# Patient Record
Sex: FEMALE | Race: WHITE | NOT HISPANIC OR LATINO | URBAN - METROPOLITAN AREA
[De-identification: names, ages, dates, MRNs, and addresses within clinical notes are randomized per-mention and may not be internally consistent; named-entity substitution may affect disease eponyms.]

---

## 2017-02-08 ENCOUNTER — OUTPATIENT (OUTPATIENT)
Dept: OUTPATIENT SERVICES | Facility: HOSPITAL | Age: 67
LOS: 1 days | Discharge: HOME | End: 2017-02-08

## 2017-02-28 ENCOUNTER — RECORD ABSTRACTING (OUTPATIENT)
Age: 67
End: 2017-02-28

## 2017-02-28 DIAGNOSIS — Z82.49 FAMILY HISTORY OF ISCHEMIC HEART DISEASE AND OTHER DISEASES OF THE CIRCULATORY SYSTEM: ICD-10-CM

## 2017-02-28 DIAGNOSIS — N63 UNSPECIFIED LUMP IN BREAST: ICD-10-CM

## 2017-02-28 DIAGNOSIS — Z83.42 FAMILY HISTORY OF FAMILIAL HYPERCHOLESTEROLEMIA: ICD-10-CM

## 2017-02-28 DIAGNOSIS — Z80.3 FAMILY HISTORY OF MALIGNANT NEOPLASM OF BREAST: ICD-10-CM

## 2017-02-28 DIAGNOSIS — L70.9 ACNE, UNSPECIFIED: ICD-10-CM

## 2017-02-28 DIAGNOSIS — C44.91 BASAL CELL CARCINOMA OF SKIN, UNSPECIFIED: ICD-10-CM

## 2017-02-28 PROBLEM — Z00.00 ENCOUNTER FOR PREVENTIVE HEALTH EXAMINATION: Status: ACTIVE | Noted: 2017-02-28

## 2017-06-07 ENCOUNTER — OUTPATIENT (OUTPATIENT)
Dept: OUTPATIENT SERVICES | Facility: HOSPITAL | Age: 67
LOS: 1 days | Discharge: HOME | End: 2017-06-07

## 2017-06-28 DIAGNOSIS — F33.1 MAJOR DEPRESSIVE DISORDER, RECURRENT, MODERATE: ICD-10-CM

## 2017-06-28 DIAGNOSIS — F43.12 POST-TRAUMATIC STRESS DISORDER, CHRONIC: ICD-10-CM

## 2017-07-05 ENCOUNTER — OUTPATIENT (OUTPATIENT)
Dept: OUTPATIENT SERVICES | Facility: HOSPITAL | Age: 67
LOS: 1 days | Discharge: HOME | End: 2017-07-05

## 2017-07-05 DIAGNOSIS — F33.1 MAJOR DEPRESSIVE DISORDER, RECURRENT, MODERATE: ICD-10-CM

## 2017-07-05 DIAGNOSIS — F43.12 POST-TRAUMATIC STRESS DISORDER, CHRONIC: ICD-10-CM

## 2017-09-05 ENCOUNTER — OUTPATIENT (OUTPATIENT)
Dept: OUTPATIENT SERVICES | Facility: HOSPITAL | Age: 67
LOS: 1 days | Discharge: HOME | End: 2017-09-05

## 2017-09-05 DIAGNOSIS — F33.1 MAJOR DEPRESSIVE DISORDER, RECURRENT, MODERATE: ICD-10-CM

## 2017-09-05 DIAGNOSIS — F43.12 POST-TRAUMATIC STRESS DISORDER, CHRONIC: ICD-10-CM

## 2017-11-01 ENCOUNTER — OUTPATIENT (OUTPATIENT)
Dept: OUTPATIENT SERVICES | Facility: HOSPITAL | Age: 67
LOS: 1 days | Discharge: HOME | End: 2017-11-01

## 2017-11-01 DIAGNOSIS — F43.12 POST-TRAUMATIC STRESS DISORDER, CHRONIC: ICD-10-CM

## 2017-11-01 DIAGNOSIS — F41.1 GENERALIZED ANXIETY DISORDER: ICD-10-CM

## 2018-01-31 ENCOUNTER — OUTPATIENT (OUTPATIENT)
Dept: OUTPATIENT SERVICES | Facility: HOSPITAL | Age: 68
LOS: 1 days | Discharge: HOME | End: 2018-01-31

## 2018-01-31 DIAGNOSIS — F43.12 POST-TRAUMATIC STRESS DISORDER, CHRONIC: ICD-10-CM

## 2018-01-31 DIAGNOSIS — F41.1 GENERALIZED ANXIETY DISORDER: ICD-10-CM

## 2018-04-03 ENCOUNTER — OUTPATIENT (OUTPATIENT)
Dept: OUTPATIENT SERVICES | Facility: HOSPITAL | Age: 68
LOS: 1 days | Discharge: HOME | End: 2018-04-03

## 2018-04-03 DIAGNOSIS — F41.1 GENERALIZED ANXIETY DISORDER: ICD-10-CM

## 2018-07-27 PROBLEM — C44.91 BASAL CELL CARCINOMA OF SKIN: Status: ACTIVE | Noted: 2017-02-28

## 2018-08-09 ENCOUNTER — OUTPATIENT (OUTPATIENT)
Dept: OUTPATIENT SERVICES | Facility: HOSPITAL | Age: 68
LOS: 1 days | Discharge: HOME | End: 2018-08-09

## 2018-08-09 DIAGNOSIS — F41.1 GENERALIZED ANXIETY DISORDER: ICD-10-CM

## 2018-09-06 ENCOUNTER — OUTPATIENT (OUTPATIENT)
Dept: OUTPATIENT SERVICES | Facility: HOSPITAL | Age: 68
LOS: 1 days | Discharge: HOME | End: 2018-09-06

## 2018-09-06 DIAGNOSIS — F41.1 GENERALIZED ANXIETY DISORDER: ICD-10-CM

## 2018-10-01 ENCOUNTER — OUTPATIENT (OUTPATIENT)
Dept: OUTPATIENT SERVICES | Facility: HOSPITAL | Age: 68
LOS: 1 days | Discharge: HOME | End: 2018-10-01

## 2018-10-01 DIAGNOSIS — F41.1 GENERALIZED ANXIETY DISORDER: ICD-10-CM

## 2018-12-06 ENCOUNTER — OUTPATIENT (OUTPATIENT)
Dept: OUTPATIENT SERVICES | Facility: HOSPITAL | Age: 68
LOS: 1 days | Discharge: HOME | End: 2018-12-06

## 2018-12-06 DIAGNOSIS — F41.1 GENERALIZED ANXIETY DISORDER: ICD-10-CM

## 2019-01-03 ENCOUNTER — OUTPATIENT (OUTPATIENT)
Dept: OUTPATIENT SERVICES | Facility: HOSPITAL | Age: 69
LOS: 1 days | Discharge: HOME | End: 2019-01-03

## 2019-01-03 DIAGNOSIS — F33.1 MAJOR DEPRESSIVE DISORDER, RECURRENT, MODERATE: ICD-10-CM

## 2019-01-03 DIAGNOSIS — F60.3 BORDERLINE PERSONALITY DISORDER: ICD-10-CM

## 2019-05-30 ENCOUNTER — OUTPATIENT (OUTPATIENT)
Dept: OUTPATIENT SERVICES | Facility: HOSPITAL | Age: 69
LOS: 1 days | Discharge: HOME | End: 2019-05-30
Payer: OTHER MISCELLANEOUS

## 2019-05-30 DIAGNOSIS — F60.9 PERSONALITY DISORDER, UNSPECIFIED: ICD-10-CM

## 2019-05-30 PROCEDURE — 99214 OFFICE O/P EST MOD 30 MIN: CPT

## 2019-06-20 ENCOUNTER — OUTPATIENT (OUTPATIENT)
Dept: OUTPATIENT SERVICES | Facility: HOSPITAL | Age: 69
LOS: 1 days | Discharge: HOME | End: 2019-06-20

## 2019-06-20 DIAGNOSIS — F60.9 PERSONALITY DISORDER, UNSPECIFIED: ICD-10-CM

## 2019-07-31 ENCOUNTER — OUTPATIENT (OUTPATIENT)
Dept: OUTPATIENT SERVICES | Facility: HOSPITAL | Age: 69
LOS: 1 days | Discharge: HOME | End: 2019-07-31
Payer: OTHER MISCELLANEOUS

## 2019-07-31 DIAGNOSIS — F60.9 PERSONALITY DISORDER, UNSPECIFIED: ICD-10-CM

## 2019-07-31 PROCEDURE — 99214 OFFICE O/P EST MOD 30 MIN: CPT | Mod: GC

## 2019-09-20 ENCOUNTER — OUTPATIENT (OUTPATIENT)
Dept: OUTPATIENT SERVICES | Facility: HOSPITAL | Age: 69
LOS: 1 days | Discharge: HOME | End: 2019-09-20
Payer: OTHER MISCELLANEOUS

## 2019-09-20 DIAGNOSIS — F60.9 PERSONALITY DISORDER, UNSPECIFIED: ICD-10-CM

## 2019-09-20 PROCEDURE — 99214 OFFICE O/P EST MOD 30 MIN: CPT | Mod: GC

## 2019-10-30 ENCOUNTER — OUTPATIENT (OUTPATIENT)
Dept: OUTPATIENT SERVICES | Facility: HOSPITAL | Age: 69
LOS: 1 days | Discharge: HOME | End: 2019-10-30
Payer: MEDICARE

## 2019-10-30 DIAGNOSIS — F60.9 PERSONALITY DISORDER, UNSPECIFIED: ICD-10-CM

## 2019-10-30 PROCEDURE — 99214 OFFICE O/P EST MOD 30 MIN: CPT | Mod: GC

## 2019-12-12 ENCOUNTER — OUTPATIENT (OUTPATIENT)
Dept: OUTPATIENT SERVICES | Facility: HOSPITAL | Age: 69
LOS: 1 days | Discharge: HOME | End: 2019-12-12
Payer: OTHER MISCELLANEOUS

## 2019-12-12 DIAGNOSIS — F60.9 PERSONALITY DISORDER, UNSPECIFIED: ICD-10-CM

## 2019-12-12 PROCEDURE — 99214 OFFICE O/P EST MOD 30 MIN: CPT | Mod: GC

## 2020-01-29 ENCOUNTER — OUTPATIENT (OUTPATIENT)
Dept: OUTPATIENT SERVICES | Facility: HOSPITAL | Age: 70
LOS: 1 days | Discharge: HOME | End: 2020-01-29
Payer: OTHER MISCELLANEOUS

## 2020-01-29 DIAGNOSIS — F41.1 GENERALIZED ANXIETY DISORDER: ICD-10-CM

## 2020-01-29 PROCEDURE — 99213 OFFICE O/P EST LOW 20 MIN: CPT | Mod: GC

## 2020-03-06 ENCOUNTER — OUTPATIENT (OUTPATIENT)
Dept: OUTPATIENT SERVICES | Facility: HOSPITAL | Age: 70
LOS: 1 days | Discharge: HOME | End: 2020-03-06
Payer: OTHER MISCELLANEOUS

## 2020-03-06 DIAGNOSIS — F31.11 BIPOLAR DISORDER, CURRENT EPISODE MANIC WITHOUT PSYCHOTIC FEATURES, MILD: ICD-10-CM

## 2020-03-06 PROCEDURE — 99214 OFFICE O/P EST MOD 30 MIN: CPT | Mod: GC

## 2020-03-27 ENCOUNTER — OUTPATIENT (OUTPATIENT)
Dept: OUTPATIENT SERVICES | Facility: HOSPITAL | Age: 70
LOS: 1 days | Discharge: HOME | End: 2020-03-27
Payer: OTHER MISCELLANEOUS

## 2020-03-27 DIAGNOSIS — F41.1 GENERALIZED ANXIETY DISORDER: ICD-10-CM

## 2020-03-27 PROCEDURE — 99442: CPT | Mod: GC

## 2020-06-24 ENCOUNTER — OUTPATIENT (OUTPATIENT)
Dept: OUTPATIENT SERVICES | Facility: HOSPITAL | Age: 70
LOS: 1 days | Discharge: HOME | End: 2020-06-24

## 2020-06-24 DIAGNOSIS — F41.1 GENERALIZED ANXIETY DISORDER: ICD-10-CM

## 2020-07-15 ENCOUNTER — OUTPATIENT (OUTPATIENT)
Dept: OUTPATIENT SERVICES | Facility: HOSPITAL | Age: 70
LOS: 1 days | Discharge: HOME | End: 2020-07-15

## 2020-07-15 DIAGNOSIS — F41.1 GENERALIZED ANXIETY DISORDER: ICD-10-CM

## 2020-07-28 ENCOUNTER — OUTPATIENT (OUTPATIENT)
Dept: OUTPATIENT SERVICES | Facility: HOSPITAL | Age: 70
LOS: 1 days | Discharge: HOME | End: 2020-07-28

## 2020-07-28 DIAGNOSIS — F41.1 GENERALIZED ANXIETY DISORDER: ICD-10-CM

## 2020-08-25 ENCOUNTER — OUTPATIENT (OUTPATIENT)
Dept: OUTPATIENT SERVICES | Facility: HOSPITAL | Age: 70
LOS: 1 days | Discharge: HOME | End: 2020-08-25

## 2020-08-25 DIAGNOSIS — F41.1 GENERALIZED ANXIETY DISORDER: ICD-10-CM

## 2020-08-27 RX ORDER — DIAZEPAM 5 MG
1 TABLET ORAL
Qty: 60 | Refills: 0
Start: 2020-08-27 | End: 2020-09-25

## 2020-10-07 ENCOUNTER — OUTPATIENT (OUTPATIENT)
Dept: OUTPATIENT SERVICES | Facility: HOSPITAL | Age: 70
LOS: 1 days | Discharge: HOME | End: 2020-10-07
Payer: MEDICARE

## 2020-10-07 DIAGNOSIS — Z12.31 ENCOUNTER FOR SCREENING MAMMOGRAM FOR MALIGNANT NEOPLASM OF BREAST: ICD-10-CM

## 2020-10-07 PROCEDURE — 77067 SCR MAMMO BI INCL CAD: CPT | Mod: 26

## 2020-10-07 PROCEDURE — 76641 ULTRASOUND BREAST COMPLETE: CPT | Mod: 26,50

## 2020-10-07 PROCEDURE — 77063 BREAST TOMOSYNTHESIS BI: CPT | Mod: 26

## 2020-10-20 RX ORDER — DIAZEPAM 5 MG
1 TABLET ORAL
Qty: 50 | Refills: 0
Start: 2020-10-20 | End: 2020-12-11

## 2020-10-20 RX ORDER — DIAZEPAM 5 MG
1 TABLET ORAL
Qty: 50 | Refills: 0
Start: 2020-10-20 | End: 2020-11-18

## 2020-10-22 ENCOUNTER — OUTPATIENT (OUTPATIENT)
Dept: OUTPATIENT SERVICES | Facility: HOSPITAL | Age: 70
LOS: 1 days | Discharge: HOME | End: 2020-10-22
Payer: MEDICARE

## 2020-10-22 DIAGNOSIS — R92.8 OTHER ABNORMAL AND INCONCLUSIVE FINDINGS ON DIAGNOSTIC IMAGING OF BREAST: ICD-10-CM

## 2020-10-22 PROCEDURE — 76642 ULTRASOUND BREAST LIMITED: CPT | Mod: 26,RT

## 2020-10-22 PROCEDURE — G0279: CPT | Mod: 26

## 2020-10-22 PROCEDURE — 77065 DX MAMMO INCL CAD UNI: CPT | Mod: 26,LT

## 2020-12-18 ENCOUNTER — APPOINTMENT (OUTPATIENT)
Dept: PSYCHIATRY | Facility: CLINIC | Age: 70
End: 2020-12-18

## 2021-01-15 ENCOUNTER — APPOINTMENT (OUTPATIENT)
Dept: PSYCHIATRY | Facility: CLINIC | Age: 71
End: 2021-01-15

## 2021-01-15 RX ORDER — HYDROCODONE BITARTRATE AND ACETAMINOPHEN 5; 300 MG/1; MG/1
TABLET ORAL
Refills: 0 | Status: DISCONTINUED | COMMUNITY
End: 2021-01-15

## 2021-02-16 ENCOUNTER — APPOINTMENT (OUTPATIENT)
Dept: PSYCHIATRY | Facility: CLINIC | Age: 71
End: 2021-02-16

## 2021-03-16 ENCOUNTER — APPOINTMENT (OUTPATIENT)
Dept: PSYCHIATRY | Facility: CLINIC | Age: 71
End: 2021-03-16

## 2021-03-16 RX ORDER — TRAZODONE HYDROCHLORIDE 150 MG/1
150 TABLET ORAL
Refills: 0 | Status: DISCONTINUED | COMMUNITY
End: 2021-03-16

## 2021-04-13 ENCOUNTER — APPOINTMENT (OUTPATIENT)
Dept: PSYCHIATRY | Facility: CLINIC | Age: 71
End: 2021-04-13

## 2021-05-13 ENCOUNTER — APPOINTMENT (OUTPATIENT)
Dept: PSYCHIATRY | Facility: CLINIC | Age: 71
End: 2021-05-13

## 2021-05-14 ENCOUNTER — APPOINTMENT (OUTPATIENT)
Dept: PSYCHIATRY | Facility: CLINIC | Age: 71
End: 2021-05-14

## 2021-06-10 ENCOUNTER — APPOINTMENT (OUTPATIENT)
Dept: PSYCHIATRY | Facility: CLINIC | Age: 71
End: 2021-06-10

## 2021-07-08 ENCOUNTER — APPOINTMENT (OUTPATIENT)
Dept: PSYCHIATRY | Facility: CLINIC | Age: 71
End: 2021-07-08

## 2021-07-08 ENCOUNTER — OUTPATIENT (OUTPATIENT)
Dept: OUTPATIENT SERVICES | Facility: HOSPITAL | Age: 71
LOS: 1 days | Discharge: HOME | End: 2021-07-08

## 2021-07-08 DIAGNOSIS — F41.9 ANXIETY DISORDER, UNSPECIFIED: ICD-10-CM

## 2021-08-17 ENCOUNTER — APPOINTMENT (OUTPATIENT)
Dept: PSYCHIATRY | Facility: CLINIC | Age: 71
End: 2021-08-17

## 2021-09-13 ENCOUNTER — APPOINTMENT (OUTPATIENT)
Dept: PSYCHIATRY | Facility: CLINIC | Age: 71
End: 2021-09-13

## 2021-09-13 ENCOUNTER — OUTPATIENT (OUTPATIENT)
Dept: OUTPATIENT SERVICES | Facility: HOSPITAL | Age: 71
LOS: 1 days | Discharge: HOME | End: 2021-09-13

## 2021-09-13 DIAGNOSIS — F41.9 ANXIETY DISORDER, UNSPECIFIED: ICD-10-CM

## 2021-10-11 ENCOUNTER — APPOINTMENT (OUTPATIENT)
Dept: PSYCHIATRY | Facility: CLINIC | Age: 71
End: 2021-10-11

## 2021-10-11 ENCOUNTER — OUTPATIENT (OUTPATIENT)
Dept: OUTPATIENT SERVICES | Facility: HOSPITAL | Age: 71
LOS: 1 days | Discharge: HOME | End: 2021-10-11

## 2021-10-11 DIAGNOSIS — F41.9 ANXIETY DISORDER, UNSPECIFIED: ICD-10-CM

## 2021-11-15 ENCOUNTER — APPOINTMENT (OUTPATIENT)
Dept: PSYCHIATRY | Facility: CLINIC | Age: 71
End: 2021-11-15

## 2021-11-15 ENCOUNTER — OUTPATIENT (OUTPATIENT)
Dept: OUTPATIENT SERVICES | Facility: HOSPITAL | Age: 71
LOS: 1 days | Discharge: HOME | End: 2021-11-15

## 2021-11-15 DIAGNOSIS — F41.9 ANXIETY DISORDER, UNSPECIFIED: ICD-10-CM

## 2021-12-06 ENCOUNTER — OUTPATIENT (OUTPATIENT)
Dept: OUTPATIENT SERVICES | Facility: HOSPITAL | Age: 71
LOS: 1 days | Discharge: HOME | End: 2021-12-06

## 2021-12-06 ENCOUNTER — APPOINTMENT (OUTPATIENT)
Dept: PSYCHIATRY | Facility: CLINIC | Age: 71
End: 2021-12-06

## 2021-12-06 DIAGNOSIS — F41.9 ANXIETY DISORDER, UNSPECIFIED: ICD-10-CM

## 2021-12-06 RX ORDER — VARENICLINE TARTRATE 0.5 (11)-1
0.5 MG X 11 & KIT ORAL
Qty: 1 | Refills: 0 | Status: COMPLETED | COMMUNITY
Start: 2021-07-08 | End: 2021-12-06

## 2021-12-06 RX ORDER — DIAZEPAM 10 MG/1
10 TABLET ORAL DAILY
Qty: 30 | Refills: 0 | Status: COMPLETED | COMMUNITY
Start: 2021-08-20 | End: 2021-12-06

## 2022-01-10 ENCOUNTER — APPOINTMENT (OUTPATIENT)
Dept: PSYCHIATRY | Facility: CLINIC | Age: 72
End: 2022-01-10

## 2022-01-10 ENCOUNTER — OUTPATIENT (OUTPATIENT)
Dept: OUTPATIENT SERVICES | Facility: HOSPITAL | Age: 72
LOS: 1 days | Discharge: HOME | End: 2022-01-10

## 2022-01-10 DIAGNOSIS — F41.9 ANXIETY DISORDER, UNSPECIFIED: ICD-10-CM

## 2022-02-07 ENCOUNTER — APPOINTMENT (OUTPATIENT)
Dept: PSYCHIATRY | Facility: CLINIC | Age: 72
End: 2022-02-07

## 2022-03-21 ENCOUNTER — APPOINTMENT (OUTPATIENT)
Dept: PSYCHIATRY | Facility: CLINIC | Age: 72
End: 2022-03-21

## 2022-03-21 ENCOUNTER — OUTPATIENT (OUTPATIENT)
Dept: OUTPATIENT SERVICES | Facility: HOSPITAL | Age: 72
LOS: 1 days | Discharge: HOME | End: 2022-03-21

## 2022-03-21 DIAGNOSIS — F41.9 ANXIETY DISORDER, UNSPECIFIED: ICD-10-CM

## 2022-03-21 RX ORDER — SERTRALINE 25 MG/1
25 TABLET, FILM COATED ORAL
Qty: 30 | Refills: 0 | Status: COMPLETED | COMMUNITY
Start: 2021-12-06 | End: 2022-03-21

## 2022-04-18 ENCOUNTER — APPOINTMENT (OUTPATIENT)
Dept: PSYCHIATRY | Facility: CLINIC | Age: 72
End: 2022-04-18

## 2022-04-18 ENCOUNTER — OUTPATIENT (OUTPATIENT)
Dept: OUTPATIENT SERVICES | Facility: HOSPITAL | Age: 72
LOS: 1 days | Discharge: HOME | End: 2022-04-18

## 2022-04-18 DIAGNOSIS — F41.9 ANXIETY DISORDER, UNSPECIFIED: ICD-10-CM

## 2022-04-18 RX ORDER — SERTRALINE HYDROCHLORIDE 50 MG/1
50 TABLET, FILM COATED ORAL
Qty: 30 | Refills: 0 | Status: COMPLETED | COMMUNITY
Start: 2022-02-07

## 2022-05-23 ENCOUNTER — OUTPATIENT (OUTPATIENT)
Dept: OUTPATIENT SERVICES | Facility: HOSPITAL | Age: 72
LOS: 1 days | Discharge: HOME | End: 2022-05-23

## 2022-05-23 ENCOUNTER — APPOINTMENT (OUTPATIENT)
Dept: PSYCHIATRY | Facility: CLINIC | Age: 72
End: 2022-05-23

## 2022-05-23 DIAGNOSIS — F41.9 ANXIETY DISORDER, UNSPECIFIED: ICD-10-CM

## 2022-06-27 ENCOUNTER — APPOINTMENT (OUTPATIENT)
Dept: PSYCHIATRY | Facility: CLINIC | Age: 72
End: 2022-06-27

## 2022-06-27 ENCOUNTER — OUTPATIENT (OUTPATIENT)
Dept: OUTPATIENT SERVICES | Facility: HOSPITAL | Age: 72
LOS: 1 days | Discharge: HOME | End: 2022-06-27

## 2022-06-27 DIAGNOSIS — F41.9 ANXIETY DISORDER, UNSPECIFIED: ICD-10-CM

## 2022-07-12 ENCOUNTER — APPOINTMENT (OUTPATIENT)
Dept: PAIN MANAGEMENT | Facility: CLINIC | Age: 72
End: 2022-07-12

## 2022-07-28 ENCOUNTER — APPOINTMENT (OUTPATIENT)
Dept: PSYCHIATRY | Facility: CLINIC | Age: 72
End: 2022-07-28

## 2022-07-28 ENCOUNTER — OUTPATIENT (OUTPATIENT)
Dept: OUTPATIENT SERVICES | Facility: HOSPITAL | Age: 72
LOS: 1 days | Discharge: HOME | End: 2022-07-28

## 2022-07-28 DIAGNOSIS — F41.9 ANXIETY DISORDER, UNSPECIFIED: ICD-10-CM

## 2022-07-28 PROCEDURE — 99215 OFFICE O/P EST HI 40 MIN: CPT | Mod: NC,GC,95

## 2022-08-09 ENCOUNTER — APPOINTMENT (OUTPATIENT)
Dept: PAIN MANAGEMENT | Facility: CLINIC | Age: 72
End: 2022-08-09

## 2022-09-01 ENCOUNTER — OUTPATIENT (OUTPATIENT)
Dept: OUTPATIENT SERVICES | Facility: HOSPITAL | Age: 72
LOS: 1 days | Discharge: HOME | End: 2022-09-01

## 2022-09-01 ENCOUNTER — APPOINTMENT (OUTPATIENT)
Dept: PSYCHIATRY | Facility: CLINIC | Age: 72
End: 2022-09-01

## 2022-09-01 DIAGNOSIS — F41.9 ANXIETY DISORDER, UNSPECIFIED: ICD-10-CM

## 2022-09-01 PROCEDURE — 99215 OFFICE O/P EST HI 40 MIN: CPT | Mod: NC,GC,95

## 2022-09-08 ENCOUNTER — LABORATORY RESULT (OUTPATIENT)
Age: 72
End: 2022-09-08

## 2022-09-08 ENCOUNTER — APPOINTMENT (OUTPATIENT)
Dept: PAIN MANAGEMENT | Facility: CLINIC | Age: 72
End: 2022-09-08

## 2022-09-08 VITALS — HEIGHT: 64 IN | WEIGHT: 160 LBS | BODY MASS INDEX: 27.31 KG/M2

## 2022-09-08 PROCEDURE — 99214 OFFICE O/P EST MOD 30 MIN: CPT

## 2022-09-08 PROCEDURE — 80104W: CUSTOM

## 2022-09-08 PROCEDURE — 99072 ADDL SUPL MATRL&STAF TM PHE: CPT

## 2022-09-08 RX ORDER — GABAPENTIN 300 MG/1
300 CAPSULE ORAL 3 TIMES DAILY
Refills: 0 | Status: DISCONTINUED | COMMUNITY
Start: 2021-01-15 | End: 2022-09-08
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
## 2022-09-09 NOTE — DISCUSSION/SUMMARY
[de-identified] : Ms. Che is a 71 year-old female familiar to our pracrtice being treated for chronic pain in her lower back and neck. Patient is currently being medically managed due to a work-related injury. She will continue with her current medication regimen of hydrocodone, gabapentin, and we will add meloxicam. She will follow up with us in 4 weeks for a revisit encounter and medication refill. \par \par Patient also has a new complaint of right hip pain which is made more severe when she walks. I advised the patient that she is a candidate fro a greater trochantic bursa injection. However, the patient does not wish to proceed with injections at this time. \par \par UDS done today is consistent and up to date. \par \par Disability and Work Status: Totally disabled, permanent.\par \par I, Madeline Miguel, attest that this documentation has been prepared under the direction and in the presence of Provider Artem Guerrero DO\par The documentation recorded by the scribe, in my presence, accurately reflects the service I personally performed, and the decisions made by me with my edits as appropriate.\par \par Best Regards, \par Artem Guerrero D.O. \par Diplomat, American Board of Anesthesiology \par Diplomat, American Board of Pain Medicine \par Diplomat, American Board of Pain Management

## 2022-09-09 NOTE — REASON FOR VISIT
[Follow-Up Visit] : a follow-up pain management visit [FreeTextEntry2] : FOLLOW UP FOR LOWER BACK PAIN AND MED REFILL

## 2022-09-09 NOTE — PHYSICAL EXAM
[de-identified] : GENERAL APPEARANCE OF PATIENT IS WELL DEVELOPED, WELL NOURISHED, BODY HABITUS NORMAL, WELL GROOMED, NO DEFORMITIES NOTED. \par Head - Atraumatic and Normocephalic \par Eyes, Nose, and Throat: External inspection of ears and nose are normal overall without scars, lesions, or masses noted. Assessment of hearing is normal\par Neck-Examination of neck shows no masses, overall appearance is normal, neck is symmetric, tracheal position is midline, no crepitus is noted. Examination of thyroid shows no enlargement, tenderness or masses\par Respiratory- Assessment of respiratory effort shows no intercostal retractions, no use of accessory muscles, unlabored breathing, and normal diaphragmatic movement.\par Cardiovascular- Examination of extremities show no edema or varicosities\par Musculoskeletal. Examination of gait is not antalgic and station is normal\par Inspection and palpation of digits and nails shows no clubbing, cyanosis, nodules, drainage, fluctuance, petechiae\par \par • Spine – 50-60 degrees in flexion. 60 in extension. Greater trochanter tenderness on the right. \par \par \par • Neck- inspection and palpation shows no misalignment, asymmetry, crepitation, defects, tenderness, masses, effusions. ROM is normal without crepitation or contracture. No instability or subluxation or laxity is noted. No abnormal movements.\par \par \par • RUE- Bicep tendon pain on the right.\par \par \par • LUE- 190-180 degrees of active and passive abduction bilaterally. \par \par \par • RLE- inspection and palpation shows no misalignment, asymmetry, crepitation, defects, tenderness, masses, effusions. ROM is normal without crepitation or contracture. No instability or subluxation or laxity is noted. No abnormal movements.\par \par \par • LLE- inspection and palpation shows no misalignment, asymmetry, crepitation, defects, tenderness, masses, effusions. ROM is normal without crepitation or contracture. No instability or subluxation or laxity is noted. No abnormal movements.\par \par \par • Skin- Inspection of skin and subcutaneous tissue shows no rashes, lesions or ulcers. Palpation of skin and subcutaneous tissue shows no rashes, no indurations, subcutaneous nodules or tightening.\par \par \par • Abdomen- Nontender\par \par \par • Neurologic- CN 2-12 are grossly intact. No sensory or motor deficits in the upper and lower extremities. Adequate strength in upper and lower extremities \par \par \par • Psychiatric- Patient’s judgment and insight are intact. Oriented to time, place and person. Recent and remote memory intact.\par \par

## 2022-09-12 LAB — AMPHET UR-MCNC: NORMAL

## 2022-09-13 ENCOUNTER — APPOINTMENT (OUTPATIENT)
Dept: PAIN MANAGEMENT | Facility: CLINIC | Age: 72
End: 2022-09-13

## 2022-10-03 ENCOUNTER — LABORATORY RESULT (OUTPATIENT)
Age: 72
End: 2022-10-03

## 2022-10-03 ENCOUNTER — APPOINTMENT (OUTPATIENT)
Dept: PAIN MANAGEMENT | Facility: CLINIC | Age: 72
End: 2022-10-03

## 2022-10-03 VITALS — WEIGHT: 160 LBS | HEIGHT: 64 IN | BODY MASS INDEX: 27.31 KG/M2

## 2022-10-03 VITALS — DIASTOLIC BLOOD PRESSURE: 68 MMHG | SYSTOLIC BLOOD PRESSURE: 115 MMHG | HEART RATE: 74 BPM

## 2022-10-03 PROCEDURE — 99214 OFFICE O/P EST MOD 30 MIN: CPT

## 2022-10-03 PROCEDURE — 99072 ADDL SUPL MATRL&STAF TM PHE: CPT
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
## 2022-10-03 NOTE — PHYSICAL EXAM
[de-identified] : GENERAL APPEARANCE OF PATIENT IS WELL DEVELOPED, WELL NOURISHED, BODY HABITUS NORMAL, WELL GROOMED, NO DEFORMITIES NOTED. \par Head - Atraumatic and Normocephalic \par Eyes, Nose, and Throat: External inspection of ears and nose are normal overall without scars, lesions, or masses noted. Assessment of hearing is normal\par Neck-Examination of neck shows no masses, overall appearance is normal, neck is symmetric, tracheal position is midline, no crepitus is noted. Examination of thyroid shows no enlargement, tenderness or masses\par Respiratory- Assessment of respiratory effort shows no intercostal retractions, no use of accessory muscles, unlabored breathing, and normal diaphragmatic movement.\par Cardiovascular- Examination of extremities show no edema or varicosities\par Musculoskeletal. Examination of gait is not antalgic and station is normal\par Inspection and palpation of digits and nails shows no clubbing, cyanosis, nodules, drainage, fluctuance, petechiae\par \par • Spine – 50-60 degrees in flexion. 60 in extension. Greater trochanter tenderness on the right. Bilateral facet tenderness at L3-S1 with pain. \par \par \par • Neck- inspection and palpation shows no misalignment, asymmetry, crepitation, defects, tenderness, masses, effusions. ROM is normal without crepitation or contracture. No instability or subluxation or laxity is noted. No abnormal movements.\par \par \par • RUE- Bicep tendon pain on the right.\par \par \par • LUE- 190-180 degrees of active and passive abduction bilaterally. \par \par \par • RLE- inspection and palpation shows no misalignment, asymmetry, crepitation, defects, tenderness, masses, effusions. ROM is normal without crepitation or contracture. No instability or subluxation or laxity is noted. No abnormal movements.\par \par \par • LLE- inspection and palpation shows no misalignment, asymmetry, crepitation, defects, tenderness, masses, effusions. ROM is normal without crepitation or contracture. No instability or subluxation or laxity is noted. No abnormal movements.\par \par \par • Skin- Inspection of skin and subcutaneous tissue shows no rashes, lesions or ulcers. Palpation of skin and subcutaneous tissue shows no rashes, no indurations, subcutaneous nodules or tightening.\par \par \par • Abdomen- Nontender\par \par \par • Neurologic- CN 2-12 are grossly intact. No sensory or motor deficits in the upper and lower extremities. Adequate strength in upper and lower extremities \par \par \par • Psychiatric- Patient’s judgment and insight are intact. Oriented to time, place and person. Recent and remote memory intact.\par \par

## 2022-10-03 NOTE — DISCUSSION/SUMMARY
[de-identified] : Ms. Che is a 71 year-old female familiar to our pracrtice being treated for chronic pain in her lower back and neck. Patient is currently being medically managed due to a work-related injury. She will continue with her current medication regimen of hydrocodone, gabapentin. Overall there is at least a 30-50% reduction in pain with the prescribed analgesics. The patient denies any adverse side effects due to the medication (sleeping disturbance, constipation, sleepiness, hallucinations and/or urination problems).  She will follow up with us in 4 weeks for a revisit encounter and medication refill. \par \par In regard to her lower back pain, Patient is presenting with mainly axial pain with impairment in ADLs and functionality pointing to facet joints.  The pain has not responded to conservative care, including medications, stretching, as well as active modalities, such as physical therapy.  Imaging studies as well as physical exam findings corroborate the symptomatology and point to the facet joints.  We will proceed with a left then right L3-S1 diagnostic medial branch blocks. We are looking for 70% relief lasting 2 hours or 50% relief lasting 24 hours to proceed with an RFA.  Patient will follow up after the injection to re-evaluate.  Risk, benefits, pros and cons of procedure were explained to the patient using models and diagrams and their questions were answered.  \par \par The patient has severe anxiety of procedures that necessitates monitored anesthesia care (MAC). The procedure performed will be close to major nerves, arteries, and spinal cord and/or joint structures. Due to the proximity of these structures, we need the patient to be still during the procedure.  With the help of MAC, this will be safely achieved and decrease the risk of any complications.\par \par UDS done 9/8/2022 is consistent and up to date. \par \par Disability and Work Status: Totally disabled, permanent.\par \par I, Madeline Miguel, attest that this documentation has been prepared under the direction and in the presence of Provider Alina Tadeo The documentation recorded by the scribe, in my presence, accurately reflects the service I personally performed, and the decisions made by me with my edits as appropriate.\par \par Best Regards, \par Artem Guerrero, D.O. \par Diplomat, American Board of Anesthesiology \par Diplomat, American Board of Pain Medicine \par Diplomat, American Board of Pain Management

## 2022-10-06 ENCOUNTER — OUTPATIENT (OUTPATIENT)
Dept: OUTPATIENT SERVICES | Facility: HOSPITAL | Age: 72
LOS: 1 days | Discharge: HOME | End: 2022-10-06

## 2022-10-06 ENCOUNTER — APPOINTMENT (OUTPATIENT)
Dept: PSYCHIATRY | Facility: CLINIC | Age: 72
End: 2022-10-06

## 2022-10-06 PROCEDURE — 99215 OFFICE O/P EST HI 40 MIN: CPT | Mod: NC,GC,95
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
## 2022-10-06 NOTE — REASON FOR VISIT
[Home] : at home, [unfilled] , at the time of the visit. [Medical Office: ([facility])___] : at the medical office located in  [Patient] : Patient [FreeTextEntry1] : "I barely leave the house"

## 2022-10-06 NOTE — PHYSICAL EXAM
[Well groomed] : well groomed [Average] : average [Cooperative] : cooperative [Anxious] : anxious [Full] : full [Clear] : clear [Linear/Goal Directed] : linear/goal directed [None] : none [None Reported] : none reported [WNL] : within normal limits [FreeTextEntry2] : not assessed [FreeTextEntry3] : not assessed [FreeTextEntry4] : not assessed

## 2022-10-07 DIAGNOSIS — F41.9 ANXIETY DISORDER, UNSPECIFIED: ICD-10-CM

## 2022-10-11 ENCOUNTER — APPOINTMENT (OUTPATIENT)
Dept: PAIN MANAGEMENT | Facility: CLINIC | Age: 72
End: 2022-10-11

## 2022-11-02 ENCOUNTER — APPOINTMENT (OUTPATIENT)
Dept: PAIN MANAGEMENT | Facility: CLINIC | Age: 72
End: 2022-11-02

## 2022-11-02 VITALS
HEIGHT: 64 IN | SYSTOLIC BLOOD PRESSURE: 142 MMHG | WEIGHT: 160 LBS | DIASTOLIC BLOOD PRESSURE: 90 MMHG | HEART RATE: 80 BPM | BODY MASS INDEX: 27.31 KG/M2

## 2022-11-02 PROCEDURE — 99213 OFFICE O/P EST LOW 20 MIN: CPT | Mod: ACP

## 2022-11-02 PROCEDURE — 99072 ADDL SUPL MATRL&STAF TM PHE: CPT | Mod: ACP

## 2022-11-02 NOTE — PHYSICAL EXAM
[de-identified] : GENERAL APPEARANCE OF PATIENT IS WELL DEVELOPED, WELL NOURISHED, BODY HABITUS NORMAL, WELL GROOMED, NO DEFORMITIES NOTED. \par Head - Atraumatic and Normocephalic \par Eyes, Nose, and Throat: External inspection of ears and nose are normal overall without scars, lesions, or masses noted. Assessment of hearing is normal\par Neck-Examination of neck shows no masses, overall appearance is normal, neck is symmetric, tracheal position is midline, no crepitus is noted. Examination of thyroid shows no enlargement, tenderness or masses\par Respiratory- Assessment of respiratory effort shows no intercostal retractions, no use of accessory muscles, unlabored breathing, and normal diaphragmatic movement.\par Cardiovascular- Examination of extremities show no edema or varicosities\par Musculoskeletal. Examination of gait is not antalgic and station is normal\par Inspection and palpation of digits and nails shows no clubbing, cyanosis, nodules, drainage, fluctuance, petechiae\par \par • Spine – 50-60 degrees in flexion. 60 in extension. Greater trochanter tenderness on the right. Bilateral facet tenderness at L3-S1 with pain. \par \par \par • Neck- inspection and palpation shows no misalignment, asymmetry, crepitation, defects, tenderness, masses, effusions. ROM is normal without crepitation or contracture. No instability or subluxation or laxity is noted. No abnormal movements.\par \par \par • RUE- Bicep tendon pain on the right.\par \par \par • LUE- 190-180 degrees of active and passive abduction bilaterally. \par \par \par • RLE- inspection and palpation shows no misalignment, asymmetry, crepitation, defects, tenderness, masses, effusions. ROM is normal without crepitation or contracture. No instability or subluxation or laxity is noted. No abnormal movements.\par \par \par • LLE- inspection and palpation shows no misalignment, asymmetry, crepitation, defects, tenderness, masses, effusions. ROM is normal without crepitation or contracture. No instability or subluxation or laxity is noted. No abnormal movements.\par \par \par • Skin- Inspection of skin and subcutaneous tissue shows no rashes, lesions or ulcers. Palpation of skin and subcutaneous tissue shows no rashes, no indurations, subcutaneous nodules or tightening.\par \par \par • Abdomen- Nontender\par \par \par • Neurologic- CN 2-12 are grossly intact. No sensory or motor deficits in the upper and lower extremities. Adequate strength in upper and lower extremities \par \par \par • Psychiatric- Patient’s judgment and insight are intact. Oriented to time, place and person. Recent and remote memory intact.\par \par
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
## 2022-11-02 NOTE — ASSESSMENT
[FreeTextEntry1] : Ms. Che is a 72 year-old female familiar to our practice being treated for chronic pain in her lower back and neck. Patient is currently being medically managed. She will continue with her current medication regimen of MS Contin, hydrocodone, gabapentin and Naproxen. She will follow up with us in 4 weeks for a revisit encounter and medication refill. \par \par Disability and Work Status: Totally disabled, permanent.

## 2022-11-02 NOTE — DISCUSSION/SUMMARY
[de-identified] : I have consulted the  registry for the purpose of reviewing the patient's controlled substance.\par \par Overall there is at least a 30-50% reduction in pain with the prescribed analgesics. The patient denies any adverse side effects due to the medication (sleeping disturbance, constipation, sleepiness, hallucinations and/or urination problems). \par \par There are no inconsistencies on urine toxicology screening which would necessitate an alteration of therapy.\par \par The patient will return to the office in 4 weeks time and is aware to contact me with any question or concerns in the interim.\par \par Fely Zamarripa PA-C\par Artem Guerrero DO\par \par \par \par \par

## 2022-11-04 ENCOUNTER — APPOINTMENT (OUTPATIENT)
Dept: PSYCHIATRY | Facility: CLINIC | Age: 72
End: 2022-11-04

## 2022-11-04 ENCOUNTER — OUTPATIENT (OUTPATIENT)
Dept: OUTPATIENT SERVICES | Facility: HOSPITAL | Age: 72
LOS: 1 days | Discharge: HOME | End: 2022-11-04

## 2022-11-04 DIAGNOSIS — F43.10 POST-TRAUMATIC STRESS DISORDER, UNSPECIFIED: ICD-10-CM

## 2022-11-04 DIAGNOSIS — F41.9 ANXIETY DISORDER, UNSPECIFIED: ICD-10-CM

## 2022-11-04 PROCEDURE — 99213 OFFICE O/P EST LOW 20 MIN: CPT | Mod: NC,GC,95
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
## 2022-11-07 NOTE — REASON FOR VISIT
[Home] : at home, [unfilled] , at the time of the visit. [Medical Office: ([facility])___] : at the medical office located in  [Verbal consent obtained from patient] : the patient, [unfilled] [Patient] : Patient [FreeTextEntry1] : I was sick

## 2022-11-07 NOTE — PHYSICAL EXAM
[Cooperative] : cooperative [Anxious] : anxious [Irritable] : irritable [Full] : full [Clear] : clear [Linear/Goal Directed] : linear/goal directed [None] : none [None Reported] : none reported [WNL] : within normal limits [FreeTextEntry2] : not assessed, telephonic [FreeTextEntry3] : not assessed, telephonic [FreeTextEntry4] : not assessed, telephonic [FreeTextEntry1] : not assessed, telephonic [de-identified] : not assessed, telephonic
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
## 2022-11-08 ENCOUNTER — APPOINTMENT (OUTPATIENT)
Dept: PAIN MANAGEMENT | Facility: CLINIC | Age: 72
End: 2022-11-08

## 2022-12-01 ENCOUNTER — APPOINTMENT (OUTPATIENT)
Dept: PSYCHIATRY | Facility: CLINIC | Age: 72
End: 2022-12-01

## 2022-12-01 ENCOUNTER — OUTPATIENT (OUTPATIENT)
Dept: OUTPATIENT SERVICES | Facility: HOSPITAL | Age: 72
LOS: 1 days | Discharge: HOME | End: 2022-12-01

## 2022-12-01 PROCEDURE — 99214 OFFICE O/P EST MOD 30 MIN: CPT | Mod: NC,GC,95

## 2022-12-02 DIAGNOSIS — F43.10 POST-TRAUMATIC STRESS DISORDER, UNSPECIFIED: ICD-10-CM

## 2022-12-02 DIAGNOSIS — F41.9 ANXIETY DISORDER, UNSPECIFIED: ICD-10-CM

## 2022-12-04 NOTE — REASON FOR VISIT
[Home] : at home, [unfilled] , at the time of the visit. [Medical Office: ([facility])___] : at the medical office located in  [Patient] : Patient

## 2022-12-04 NOTE — PHYSICAL EXAM
[Well groomed] : well groomed [Average] : average [Cooperative] : cooperative [Anxious] : anxious [Irritable] : irritable [Full] : full [Loud] : loud [Linear/Goal Directed] : linear/goal directed [None] : none [None Reported] : none reported [WNL] : within normal limits

## 2022-12-05 ENCOUNTER — APPOINTMENT (OUTPATIENT)
Dept: PSYCHIATRY | Facility: CLINIC | Age: 72
End: 2022-12-05

## 2022-12-06 ENCOUNTER — APPOINTMENT (OUTPATIENT)
Dept: PAIN MANAGEMENT | Facility: CLINIC | Age: 72
End: 2022-12-06

## 2022-12-06 ENCOUNTER — APPOINTMENT (OUTPATIENT)
Dept: PSYCHIATRY | Facility: CLINIC | Age: 72
End: 2022-12-06

## 2022-12-06 NOTE — DISCUSSION/SUMMARY
[FreeTextEntry1] : Patient unable to participate in appointment due to perseveration on technical issues.

## 2022-12-07 NOTE — HISTORY OF PRESENT ILLNESS
[FreeTextEntry1] : Ms. Che seen, evaluated. The majority of appointment time was spent trying to set up video link and addressing technical difficulties. Reports continued high anxiety, but that she has been "doing really good" with reducing Valium intake this year and does not think she needs to change her anxiety management strategies. Patient denies any symptoms suggestive of spencer or psychosis. Patient denies any auditory or visual hallucinations. Denies any suicidal or homicidal ideation at this time.
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
## 2022-12-14 ENCOUNTER — APPOINTMENT (OUTPATIENT)
Dept: PAIN MANAGEMENT | Facility: CLINIC | Age: 72
End: 2022-12-14

## 2022-12-14 VITALS
WEIGHT: 160 LBS | SYSTOLIC BLOOD PRESSURE: 121 MMHG | BODY MASS INDEX: 27.31 KG/M2 | DIASTOLIC BLOOD PRESSURE: 76 MMHG | HEART RATE: 75 BPM | HEIGHT: 64 IN

## 2022-12-14 PROCEDURE — 99072 ADDL SUPL MATRL&STAF TM PHE: CPT

## 2022-12-14 PROCEDURE — 99213 OFFICE O/P EST LOW 20 MIN: CPT | Mod: ACP
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
## 2022-12-15 NOTE — PHYSICAL EXAM
[de-identified] : GENERAL APPEARANCE OF PATIENT IS WELL DEVELOPED, WELL NOURISHED, BODY HABITUS NORMAL, WELL GROOMED, NO DEFORMITIES NOTED. \par Head - Atraumatic and Normocephalic \par Eyes, Nose, and Throat: External inspection of ears and nose are normal overall without scars, lesions, or masses noted. Assessment of hearing is normal\par Neck-Examination of neck shows no masses, overall appearance is normal, neck is symmetric, tracheal position is midline, no crepitus is noted. Examination of thyroid shows no enlargement, tenderness or masses\par Respiratory- Assessment of respiratory effort shows no intercostal retractions, no use of accessory muscles, unlabored breathing, and normal diaphragmatic movement.\par Cardiovascular- Examination of extremities show no edema or varicosities\par Musculoskeletal. Examination of gait is not antalgic and station is normal\par Inspection and palpation of digits and nails shows no clubbing, cyanosis, nodules, drainage, fluctuance, petechiae\par \par • Spine – 50-60 degrees in flexion. 60 in extension. Greater trochanter tenderness on the right. Bilateral facet tenderness at L3-S1 with pain. \par \par \par • Neck- inspection and palpation shows no misalignment, asymmetry, crepitation, defects, tenderness, masses, effusions. ROM is normal without crepitation or contracture. No instability or subluxation or laxity is noted. No abnormal movements.\par \par \par • RUE- Bicep tendon pain on the right.\par \par \par • LUE- 190-180 degrees of active and passive abduction bilaterally. \par \par \par • RLE- inspection and palpation shows no misalignment, asymmetry, crepitation, defects, tenderness, masses, effusions. ROM is normal without crepitation or contracture. No instability or subluxation or laxity is noted. No abnormal movements.\par \par \par • LLE- inspection and palpation shows no misalignment, asymmetry, crepitation, defects, tenderness, masses, effusions. ROM is normal without crepitation or contracture. No instability or subluxation or laxity is noted. No abnormal movements.\par \par \par • Skin- Inspection of skin and subcutaneous tissue shows no rashes, lesions or ulcers. Palpation of skin and subcutaneous tissue shows no rashes, no indurations, subcutaneous nodules or tightening.\par \par \par • Abdomen- Nontender\par \par \par • Neurologic- CN 2-12 are grossly intact. No sensory or motor deficits in the upper and lower extremities. Adequate strength in upper and lower extremities \par \par \par • Psychiatric- Patient’s judgment and insight are intact. Oriented to time, place and person. Recent and remote memory intact.\par \par

## 2022-12-15 NOTE — DISCUSSION/SUMMARY
[de-identified] : I have consulted the  registry for the purpose of reviewing the patient's controlled substance.\par \par Overall there is at least a 30-50% reduction in pain with the prescribed analgesics. The patient denies any adverse side effects due to the medication (sleeping disturbance, constipation, sleepiness, hallucinations and/or urination problems). \par \par There are no inconsistencies on urine toxicology screening which would necessitate an alteration of therapy.\par \par The patient will return to the office in 4 weeks time and is aware to contact me with any question or concerns in the interim.\par \par Fely Zamarripa PA-C\par Artem Guerrero DO\par \par \par \par \par

## 2022-12-15 NOTE — ASSESSMENT
[FreeTextEntry1] : Ms. Che is a 72 year-old female familiar to our practice being treated for chronic pain in her lower back and neck. Patient is currently being medically managed. She will continue with her current medication regimen of MS Contin, hydrocodone, gabapentin and Naproxen. She will follow up with us in 4 weeks for a revisit encounter and medication refills. \par \par Disability and Work Status: Totally disabled, permanent.

## 2023-01-04 ENCOUNTER — APPOINTMENT (OUTPATIENT)
Dept: PAIN MANAGEMENT | Facility: CLINIC | Age: 73
End: 2023-01-04

## 2023-01-09 ENCOUNTER — OUTPATIENT (OUTPATIENT)
Dept: OUTPATIENT SERVICES | Facility: HOSPITAL | Age: 73
LOS: 1 days | Discharge: HOME | End: 2023-01-09

## 2023-01-09 ENCOUNTER — APPOINTMENT (OUTPATIENT)
Dept: PSYCHIATRY | Facility: CLINIC | Age: 73
End: 2023-01-09
Payer: OTHER MISCELLANEOUS

## 2023-01-09 DIAGNOSIS — F43.10 POST-TRAUMATIC STRESS DISORDER, UNSPECIFIED: ICD-10-CM

## 2023-01-09 DIAGNOSIS — F41.9 ANXIETY DISORDER, UNSPECIFIED: ICD-10-CM

## 2023-01-09 PROCEDURE — 99213 OFFICE O/P EST LOW 20 MIN: CPT | Mod: NC,GC,95

## 2023-01-11 ENCOUNTER — APPOINTMENT (OUTPATIENT)
Dept: PAIN MANAGEMENT | Facility: CLINIC | Age: 73
End: 2023-01-11
Payer: OTHER MISCELLANEOUS

## 2023-01-11 VITALS — WEIGHT: 160 LBS | BODY MASS INDEX: 27.31 KG/M2 | HEIGHT: 64 IN

## 2023-01-11 PROCEDURE — 96102W: CUSTOM | Mod: ACP

## 2023-01-11 PROCEDURE — 99072 ADDL SUPL MATRL&STAF TM PHE: CPT

## 2023-01-11 PROCEDURE — 99213 OFFICE O/P EST LOW 20 MIN: CPT | Mod: ACP

## 2023-01-11 RX ORDER — GABAPENTIN 600 MG/1
600 TABLET, COATED ORAL EVERY 8 HOURS
Qty: 90 | Refills: 0 | Status: DISCONTINUED | COMMUNITY
Start: 2022-10-03 | End: 2023-01-11

## 2023-01-11 RX ORDER — MELOXICAM 15 MG/1
15 TABLET ORAL DAILY
Qty: 30 | Refills: 3 | Status: DISCONTINUED | COMMUNITY
Start: 2022-09-08 | End: 2023-01-11

## 2023-01-11 RX ORDER — HYDROCODONE BITARTRATE AND ACETAMINOPHEN 10; 300 MG/1; MG/1
10-300 TABLET ORAL
Qty: 90 | Refills: 0 | Status: DISCONTINUED | COMMUNITY
Start: 2022-03-14 | End: 2023-01-11

## 2023-01-11 RX ORDER — GABAPENTIN 600 MG/1
600 TABLET, COATED ORAL 3 TIMES DAILY
Qty: 90 | Refills: 2 | Status: DISCONTINUED | COMMUNITY
Start: 2022-09-08 | End: 2023-01-11

## 2023-01-11 RX ORDER — HYDROCODONE BITARTRATE AND ACETAMINOPHEN 10; 325 MG/1; MG/1
10-325 TABLET ORAL EVERY 8 HOURS
Qty: 90 | Refills: 0 | Status: DISCONTINUED | COMMUNITY
Start: 2022-09-08 | End: 2023-01-11

## 2023-01-11 RX ORDER — HYDROCODONE BITARTRATE AND ACETAMINOPHEN 10; 325 MG/1; MG/1
10-325 TABLET ORAL 3 TIMES DAILY
Qty: 90 | Refills: 0 | Status: DISCONTINUED | COMMUNITY
Start: 2021-01-15 | End: 2023-01-11

## 2023-01-11 RX ORDER — MORPHINE SULFATE 30 MG/1
30 TABLET, FILM COATED, EXTENDED RELEASE ORAL
Qty: 60 | Refills: 0 | Status: DISCONTINUED | COMMUNITY
End: 2023-01-11

## 2023-01-11 NOTE — ASSESSMENT
[FreeTextEntry1] : Ms. Che is a 72 year-old female familiar to our practice being treated for chronic pain in her lower back and neck. Patient is currently being medically managed. She will continue with her current medication regimen of MS Contin, hydrocodone and gabapentin. She will discontinue Naproxen. She was advised to follow up with her PCP or gastroenterologist if her epigastric pain continues. She will take OTC Prilosec. She will follow up with us in 4 weeks for a revisit encounter and medication refills. \par \par Disability and Work Status: Totally disabled, permanent.
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
## 2023-01-11 NOTE — PHYSICAL EXAM
[de-identified] : GENERAL APPEARANCE OF PATIENT IS WELL DEVELOPED, WELL NOURISHED, BODY HABITUS NORMAL, WELL GROOMED, NO DEFORMITIES NOTED. \par Head - Atraumatic and Normocephalic \par Eyes, Nose, and Throat: External inspection of ears and nose are normal overall without scars, lesions, or masses noted. Assessment of hearing is normal\par Neck-Examination of neck shows no masses, overall appearance is normal, neck is symmetric, tracheal position is midline, no crepitus is noted. Examination of thyroid shows no enlargement, tenderness or masses\par Respiratory- Assessment of respiratory effort shows no intercostal retractions, no use of accessory muscles, unlabored breathing, and normal diaphragmatic movement.\par Cardiovascular- Examination of extremities show no edema or varicosities\par Musculoskeletal. Examination of gait is not antalgic and station is normal\par Inspection and palpation of digits and nails shows no clubbing, cyanosis, nodules, drainage, fluctuance, petechiae\par \par • Spine – 50-60 degrees in flexion. 60 in extension. Greater trochanter tenderness on the right. Bilateral facet tenderness at L3-S1 with pain. \par \par \par • Neck- inspection and palpation shows no misalignment, asymmetry, crepitation, defects, tenderness, masses, effusions. ROM is normal without crepitation or contracture. No instability or subluxation or laxity is noted. No abnormal movements.\par \par \par • RUE- Bicep tendon pain on the right.\par \par \par • LUE- 190-180 degrees of active and passive abduction bilaterally. \par \par \par • RLE- inspection and palpation shows no misalignment, asymmetry, crepitation, defects, tenderness, masses, effusions. ROM is normal without crepitation or contracture. No instability or subluxation or laxity is noted. No abnormal movements.\par \par \par • LLE- inspection and palpation shows no misalignment, asymmetry, crepitation, defects, tenderness, masses, effusions. ROM is normal without crepitation or contracture. No instability or subluxation or laxity is noted. No abnormal movements.\par \par \par • Skin- Inspection of skin and subcutaneous tissue shows no rashes, lesions or ulcers. Palpation of skin and subcutaneous tissue shows no rashes, no indurations, subcutaneous nodules or tightening.\par \par \par • Abdomen- Nontender\par \par \par • Neurologic- CN 2-12 are grossly intact. No sensory or motor deficits in the upper and lower extremities. Adequate strength in upper and lower extremities \par \par \par • Psychiatric- Patient’s judgment and insight are intact. Oriented to time, place and person. Recent and remote memory intact.\par \par

## 2023-01-11 NOTE — DISCUSSION/SUMMARY
[de-identified] : I have consulted the  registry for the purpose of reviewing the patient's controlled substance.\par \par Overall there is at least a 30-50% reduction in pain with the prescribed analgesics. The patient denies any adverse side effects due to the medication (sleeping disturbance, constipation, sleepiness, hallucinations and/or urination problems). \par \par There are no inconsistencies on urine toxicology screening which would necessitate an alteration of therapy.\par \par The patient will return to the office in 4 weeks time and is aware to contact me with any question or concerns in the interim.\par \par Fely Zamarripa PA-C\par Artem Guerrero DO\par \par \par \par \par

## 2023-01-17 ENCOUNTER — FORM ENCOUNTER (OUTPATIENT)
Age: 73
End: 2023-01-17

## 2023-01-22 NOTE — REASON FOR VISIT
[Patient preference] : as per patient preference [Telehealth (audio & video) - Individual/Group] : This visit was provided via telehealth using real-time 2-way audio visual technology. [Patient] : the patient [Medical Office: ([facility])___] : The provider was located at the medical office in [unfilled]. [Home] : The patient, [unfilled], was located at home, [unfilled], at the time of the visit. [Supervisor present for visit (for trainees)] : Supervisor present for visit (for trainees). [FreeTextEntry1] : "I never meant it like that"

## 2023-01-22 NOTE — HISTORY OF PRESENT ILLNESS
[FreeTextEntry1] : Ms. Caro seen, evaluated. She reports willingness to retrial SSRI. Reports seeking care for THC to manage anxiety instead of valium. Patient denies any symptoms suggestive of spencer or psychosis. Patient denies any auditory or visual hallucinations. Denies any suicidal or homicidal ideation at this time.
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
## 2023-02-01 ENCOUNTER — APPOINTMENT (OUTPATIENT)
Dept: PAIN MANAGEMENT | Facility: CLINIC | Age: 73
End: 2023-02-01

## 2023-02-03 ENCOUNTER — APPOINTMENT (OUTPATIENT)
Dept: PAIN MANAGEMENT | Facility: CLINIC | Age: 73
End: 2023-02-03

## 2023-02-13 ENCOUNTER — APPOINTMENT (OUTPATIENT)
Dept: PSYCHIATRY | Facility: CLINIC | Age: 73
End: 2023-02-13

## 2023-02-13 ENCOUNTER — APPOINTMENT (OUTPATIENT)
Dept: PSYCHIATRY | Facility: CLINIC | Age: 73
End: 2023-02-13
Payer: OTHER MISCELLANEOUS

## 2023-02-13 ENCOUNTER — OUTPATIENT (OUTPATIENT)
Dept: OUTPATIENT SERVICES | Facility: HOSPITAL | Age: 73
LOS: 1 days | End: 2023-02-13
Payer: COMMERCIAL

## 2023-02-13 DIAGNOSIS — F41.9 ANXIETY DISORDER, UNSPECIFIED: ICD-10-CM

## 2023-02-13 DIAGNOSIS — F43.10 POST-TRAUMATIC STRESS DISORDER, UNSPECIFIED: ICD-10-CM

## 2023-02-13 PROCEDURE — 99214 OFFICE O/P EST MOD 30 MIN: CPT | Mod: 95

## 2023-02-13 PROCEDURE — ZZZZZ: CPT

## 2023-02-13 RX ORDER — DIAZEPAM 5 MG
1.5 TABLET ORAL
Qty: 35 | Refills: 0
Start: 2023-02-13 | End: 2023-04-11

## 2023-02-13 RX ORDER — DIAZEPAM 5 MG
1.5 TABLET ORAL
Qty: 35 | Refills: 0
Start: 2023-02-13 | End: 2023-06-06

## 2023-02-13 NOTE — PHYSICAL EXAM
[Well groomed] : well groomed [Average] : average [Cooperative] : cooperative [Anxious] : anxious [Irritable] : irritable [Full] : full [Loud] : loud [Linear/Goal Directed] : linear/goal directed [None] : none [None Reported] : none reported [WNL] : within normal limits [FreeTextEntry2] : not directly observed [FreeTextEntry3] : not formally assessed

## 2023-02-13 NOTE — HISTORY OF PRESENT ILLNESS
[FreeTextEntry1] : Ms. Che seen, evaluated on Zoom 2/2 Ms. Che is unable to navigate teledoc. She reports having caught COVID last week, having symptoms of congestion, confusion, poor appetite. She reports annoyance about being sick "all the time", and being annoyed with her daughter. Reports feeling abandonment with her daughter, and feeling like she may die alone. Also reports having a kidney stone which she treated with apple cider vinegar pills. Reports not being compliant with sertraline as she was either sick or confused. \par \par She denies suicidal ideation, homicidal ideation, seeing or hearing anything unusual.
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
## 2023-02-13 NOTE — REASON FOR VISIT
[Continuing, patient not seen in-person within last 12 months (provide details below)] : Telehealth services are continuing, patient not seen in-person within last 12 months.  [Telehealth (audio & video) - Individual/Group] : This visit was provided via telehealth using real-time 2-way audio visual technology. [Verbal consent obtained from patient/other participant(s)] : Verbal consent for telehealth/telephonic services obtained from patient/other participant(s) [FreeTextEntry4] : 1:30 [FreeTextEntry5] : 2:10 [FreeTextEntry3] : Patient encouraged to come in person  [FreeTextEntry1] : "I had COVID"

## 2023-02-16 ENCOUNTER — APPOINTMENT (OUTPATIENT)
Dept: PAIN MANAGEMENT | Facility: CLINIC | Age: 73
End: 2023-02-16
Payer: OTHER MISCELLANEOUS

## 2023-02-16 VITALS — HEIGHT: 64 IN | WEIGHT: 160 LBS | BODY MASS INDEX: 27.31 KG/M2

## 2023-02-16 PROCEDURE — 99072 ADDL SUPL MATRL&STAF TM PHE: CPT

## 2023-02-16 PROCEDURE — 99213 OFFICE O/P EST LOW 20 MIN: CPT | Mod: ACP

## 2023-02-16 NOTE — DISCUSSION/SUMMARY
[Medication Risks Reviewed] : Medication risks reviewed [de-identified] : I have consulted the  registry for the purpose of reviewing the patient's controlled substance.\par \par Overall there is at least a 30-50% reduction in pain with the prescribed analgesics. The patient denies any adverse side effects due to the medication (sleeping disturbance, constipation, sleepiness, hallucinations and/or urination problems). \par \par There are no inconsistencies on urine toxicology screening which would necessitate an alteration of therapy.\par \par The patient will return to the office in 4 weeks time and is aware to contact me with any question or concerns in the interim.\par \par Tony Grossman PA-C\par Artem Guerrero DO\par \par \par \par \par
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
## 2023-02-16 NOTE — ASSESSMENT
[FreeTextEntry1] : Ms. Che is a 72 year-old female familiar to our practice being treated for chronic pain in her lower back and neck. Patient is currently being medically managed. She will continue with her current medication regimen of MS Contin, Hydrocodone and Gabapentin.  She will follow up with us in 4 weeks for a revisit encounter and medication refills. \par \par Disability and Work Status: Totally disabled, permanent.

## 2023-02-16 NOTE — PHYSICAL EXAM
[de-identified] : GENERAL APPEARANCE OF PATIENT IS WELL DEVELOPED, WELL NOURISHED, BODY HABITUS NORMAL, WELL GROOMED, NO DEFORMITIES NOTED. \par Head - Atraumatic and Normocephalic \par Eyes, Nose, and Throat: External inspection of ears and nose are normal overall without scars, lesions, or masses noted. Assessment of hearing is normal\par Neck-Examination of neck shows no masses, overall appearance is normal, neck is symmetric, tracheal position is midline, no crepitus is noted. Examination of thyroid shows no enlargement, tenderness or masses\par Respiratory- Assessment of respiratory effort shows no intercostal retractions, no use of accessory muscles, unlabored breathing, and normal diaphragmatic movement.\par Cardiovascular- Examination of extremities show no edema or varicosities\par Musculoskeletal. Examination of gait is not antalgic and station is normal\par Inspection and palpation of digits and nails shows no clubbing, cyanosis, nodules, drainage, fluctuance, petechiae\par \par • Spine – 50-60 degrees in flexion. 60 in extension. Greater trochanter tenderness on the right. Bilateral facet tenderness at L3-S1 with pain. \par \par \par • Neck- inspection and palpation shows no misalignment, asymmetry, crepitation, defects, tenderness, masses, effusions. ROM is normal without crepitation or contracture. No instability or subluxation or laxity is noted. No abnormal movements.\par \par \par • RUE- Bicep tendon pain on the right.\par \par \par • LUE- 190-180 degrees of active and passive abduction bilaterally. \par \par \par • RLE- inspection and palpation shows no misalignment, asymmetry, crepitation, defects, tenderness, masses, effusions. ROM is normal without crepitation or contracture. No instability or subluxation or laxity is noted. No abnormal movements.\par \par \par • LLE- inspection and palpation shows no misalignment, asymmetry, crepitation, defects, tenderness, masses, effusions. ROM is normal without crepitation or contracture. No instability or subluxation or laxity is noted. No abnormal movements.\par \par \par • Skin- Inspection of skin and subcutaneous tissue shows no rashes, lesions or ulcers. Palpation of skin and subcutaneous tissue shows no rashes, no indurations, subcutaneous nodules or tightening.\par \par \par • Abdomen- Nontender\par \par \par • Neurologic- CN 2-12 are grossly intact. No sensory or motor deficits in the upper and lower extremities. Adequate strength in upper and lower extremities \par \par \par • Psychiatric- Patient’s judgment and insight are intact. Oriented to time, place and person. Recent and remote memory intact.\par \par

## 2023-02-28 DIAGNOSIS — F41.9 ANXIETY DISORDER, UNSPECIFIED: ICD-10-CM

## 2023-02-28 DIAGNOSIS — F43.10 POST-TRAUMATIC STRESS DISORDER, UNSPECIFIED: ICD-10-CM

## 2023-03-13 ENCOUNTER — OUTPATIENT (OUTPATIENT)
Dept: OUTPATIENT SERVICES | Facility: HOSPITAL | Age: 73
LOS: 1 days | End: 2023-03-13
Payer: COMMERCIAL

## 2023-03-13 ENCOUNTER — APPOINTMENT (OUTPATIENT)
Dept: PSYCHIATRY | Facility: CLINIC | Age: 73
End: 2023-03-13
Payer: OTHER MISCELLANEOUS

## 2023-03-13 DIAGNOSIS — F41.9 ANXIETY DISORDER, UNSPECIFIED: ICD-10-CM

## 2023-03-13 DIAGNOSIS — Z72.0 TOBACCO USE: ICD-10-CM

## 2023-03-13 DIAGNOSIS — F43.10 POST-TRAUMATIC STRESS DISORDER, UNSPECIFIED: ICD-10-CM

## 2023-03-13 PROCEDURE — ZZZZZ: CPT

## 2023-03-13 PROCEDURE — 99215 OFFICE O/P EST HI 40 MIN: CPT | Mod: 95

## 2023-03-13 NOTE — REASON FOR VISIT
[Continuing, patient not seen in-person within last 12 months (provide details below)] : Telehealth services are continuing, patient not seen in-person within last 12 months.  [Telehealth (audio & video) - Individual/Group] : This visit was provided via telehealth using real-time 2-way audio visual technology. [Verbal consent obtained from patient/other participant(s)] : Verbal consent for telehealth/telephonic services obtained from patient/other participant(s) [Access issues (e.g., transportation, impaired mobility, etc.)] : due to patient's access issues [Medical Office: ([facility])___] : The provider was located at the medical office in [unfilled]. [Home] : The patient, [unfilled], was located at home, [unfilled], at the time of the visit. [Patient] : Patient [FreeTextEntry4] : 1:31 [FreeTextEntry5] : 2:15 [FreeTextEntry3] : Patient encouraged to come in person  [FreeTextEntry1] : "I had COVID"

## 2023-03-13 NOTE — PHYSICAL EXAM
[Well groomed] : well groomed [Average] : average [Cooperative] : cooperative [Anxious] : anxious [Irritable] : irritable [Full] : full [Rapid] : rapid [Linear/Goal Directed] : linear/goal directed [None] : none [None Reported] : none reported [WNL] : within normal limits [FreeTextEntry2] : not directly observed [FreeTextEntry3] : not formally assessed
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
## 2023-03-13 NOTE — PLAN
[No] : No [Medication education provided] : Medication education provided. [Rationale for medication choices, possible risks/precautions, benefits, alternative treatment choices, and consequences of non-treatment discussed] : Rationale for medication choices, possible risks/precautions, benefits, alternative treatment choices, and consequences of non-treatment discussed with patient/family/caregiver  [Order(s) for medication placed in [place] EHR] : Medication [FreeTextEntry4] : Goal: Nicotine cessation\par Objective: start chantix to support nicotine cessation \par \par Goal: Manage anxiety and panic\par Objective 1: Pt will attend appointments regularly \par Objective 2: pt will be adherent to treatment recommendations\par  [FreeTextEntry5] : PTSD\par -continue Zoloft 25mg po daily\par -continue Trazodone 150mg PO qhs\par \par ELSA with panic attacks\par -continue Valium 10mg PO 1-2 times daily PRN, sent 35 pills via [facility] \par -f/u in 1 month \par \par #nicotine use disorder\par - Chantix pack sent\par - nicotine patch declined\par \par The following are noted from pain management:\par Gabapentin 600 MG Oral Tablet; TAKE 1 TABLET Every 8 hours\par Start: HYDROcodone-Acetaminophen  MG Oral Tablet; TAKE 1 TABLET Every 8\par hours MDD:3 tabs\par Start: Morphine Sulfate ER 30 MG Oral Tablet Extended Release (MS Contin); TAKE 1\par TABLET Every twelve hours MDD:2 tabs\par
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
## 2023-03-14 DIAGNOSIS — F43.10 POST-TRAUMATIC STRESS DISORDER, UNSPECIFIED: ICD-10-CM

## 2023-03-14 DIAGNOSIS — F41.9 ANXIETY DISORDER, UNSPECIFIED: ICD-10-CM

## 2023-03-16 ENCOUNTER — LABORATORY RESULT (OUTPATIENT)
Age: 73
End: 2023-03-16

## 2023-03-16 ENCOUNTER — APPOINTMENT (OUTPATIENT)
Dept: PAIN MANAGEMENT | Facility: CLINIC | Age: 73
End: 2023-03-16
Payer: OTHER MISCELLANEOUS

## 2023-03-16 VITALS — BODY MASS INDEX: 27.31 KG/M2 | HEIGHT: 64 IN | WEIGHT: 160 LBS

## 2023-03-16 LAB
AMP / AMPHETAMINE: NEGATIVE
BAR / SECOBARBITAL: NEGATIVE
BUP / BUPRENORPHINE: NEGATIVE
BZO / OXAZEPAM: POSITIVE
COC / COCAINE: NEGATIVE
CREATININE: 50 MG/DL
MDMA / METHYLENEDIOXYMETHAMPHETAMINE: NEGATIVE
MET / METHAMPHETAMINES: NEGATIVE
MOP / MORPHINE: POSITIVE
MTD / METHADONE: NEGATIVE
OXY / OXYCODONE: NEGATIVE
PCP / PHENCYCLIDINE: NEGATIVE
PH: 5
SPECIFIC GRAVITY: 1.01
TEMPERATURE: 92 C
THC / MARIJUANA: NEGATIVE

## 2023-03-16 PROCEDURE — 80305 DRUG TEST PRSMV DIR OPT OBS: CPT | Mod: QW

## 2023-03-16 PROCEDURE — 99213 OFFICE O/P EST LOW 20 MIN: CPT | Mod: ACP

## 2023-03-16 PROCEDURE — 99072 ADDL SUPL MATRL&STAF TM PHE: CPT

## 2023-03-16 NOTE — PHYSICAL EXAM
[de-identified] : GENERAL APPEARANCE OF PATIENT IS WELL DEVELOPED, WELL NOURISHED, BODY HABITUS NORMAL, WELL GROOMED, NO DEFORMITIES NOTED. \par Head - Atraumatic and Normocephalic \par Eyes, Nose, and Throat: External inspection of ears and nose are normal overall without scars, lesions, or masses noted. Assessment of hearing is normal\par Neck-Examination of neck shows no masses, overall appearance is normal, neck is symmetric, tracheal position is midline, no crepitus is noted. Examination of thyroid shows no enlargement, tenderness or masses\par Respiratory- Assessment of respiratory effort shows no intercostal retractions, no use of accessory muscles, unlabored breathing, and normal diaphragmatic movement.\par Cardiovascular- Examination of extremities show no edema or varicosities\par Musculoskeletal. Examination of gait is not antalgic and station is normal\par Inspection and palpation of digits and nails shows no clubbing, cyanosis, nodules, drainage, fluctuance, petechiae\par \par • Spine – 50-60 degrees in flexion. 60 in extension. Greater trochanter tenderness on the right. Bilateral facet tenderness at L3-S1 with pain. \par \par \par • Neck- inspection and palpation shows no misalignment, asymmetry, crepitation, defects, tenderness, masses, effusions. ROM is normal without crepitation or contracture. No instability or subluxation or laxity is noted. No abnormal movements.\par \par \par • RUE- Bicep tendon pain on the right.\par \par \par • LUE- 190-180 degrees of active and passive abduction bilaterally. \par \par \par • RLE- inspection and palpation shows no misalignment, asymmetry, crepitation, defects, tenderness, masses, effusions. ROM is normal without crepitation or contracture. No instability or subluxation or laxity is noted. No abnormal movements.\par \par \par • LLE- inspection and palpation shows no misalignment, asymmetry, crepitation, defects, tenderness, masses, effusions. ROM is normal without crepitation or contracture. No instability or subluxation or laxity is noted. No abnormal movements.\par \par \par • Skin- Inspection of skin and subcutaneous tissue shows no rashes, lesions or ulcers. Palpation of skin and subcutaneous tissue shows no rashes, no indurations, subcutaneous nodules or tightening.\par \par \par • Abdomen- Nontender\par \par \par • Neurologic- CN 2-12 are grossly intact. No sensory or motor deficits in the upper and lower extremities. Adequate strength in upper and lower extremities \par \par \par • Psychiatric- Patient’s judgment and insight are intact. Oriented to time, place and person. Recent and remote memory intact.\par \par
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
## 2023-03-16 NOTE — ASSESSMENT
[FreeTextEntry1] : Ms. Che is a 72 year-old female familiar to our practice being treated for chronic pain in her lower back and neck. Patient is currently being medically managed. She will continue with her current medication regimen of MS Contin, Hydrocodone and Gabapentin.  She will follow up with us in 4 weeks for a revisit encounter and medication refills. \par \par Disability and Work Status: Totally disabled, permanent.\par \par I have consulted the  registry for the purpose of reviewing the patient's controlled substance.\par \par Urine drug screening collected today with rapid sample result consistent with given regimen. Sample to be sent for confirmatory testing.\par \par Overall there is at least a 30-50% reduction in pain with the prescribed analgesics. The patient denies any adverse side effects due to the medication (sleeping disturbance, constipation, sleepiness, hallucinations and/or urination problems). \par \par Familia Zhang PA-C\par Artem Guerrero D.O\par

## 2023-03-22 LAB
PM 6 MAM: NEGATIVE NG/ML
PM 7-AMINO-CLONAZ: NEGATIVE NG/ML
PM ALPHA-HYDROXY-ALPRAZOLAM: NEGATIVE NG/ML
PM ALPHA-HYDROXY-MIDAZOLAM: NEGATIVE NG/ML
PM ALPRAZOLAM: NEGATIVE NG/ML
PM AMOBARBITAL: NEGATIVE NG/ML
PM AMPHETAMINE INTERP: NEGATIVE
PM AMPHETAMINE: NEGATIVE NG/ML
PM BARBURATES INTERP: NEGATIVE
PM BEG: NEGATIVE NG/ML
PM BENZODIAZEPINES INTERP: POSITIVE
PM BUPRENORPHINE INTERP: POSITIVE
PM BUPRENORPHINE: NEGATIVE NG/ML
PM BUTALBITAL: NEGATIVE NG/ML
PM CLONAZEPAM: NEGATIVE NG/ML
PM COCAINE INTERP: NEGATIVE
PM COCAINE: NEGATIVE NG/ML
PM CODIENE: NEGATIVE NG/ML
PM COTININE: >1000 NG/ML
PM DIAZEPAM: NEGATIVE NG/ML
PM DIHYROCODEINE: 528 NG/ML
PM EDDP: NEGATIVE NG/ML
PM FENTANYL INTERP: NEGATIVE NG/ML
PM FENTANYL: NEGATIVE NG/ML
PM FLUNITRAZEPAM: NEGATIVE NG/ML
PM FLURAZEPAM: NEGATIVE NG/ML
PM HYDROCODONE: 925 NG/ML
PM HYDROMORPHONE: 68 NG/ML
PM LORAZEPAM: NEGATIVE NG/ML
PM MARIJUANA (DELTA-9-THC): NEGATIVE NG/ML
PM MARIJUANA INTERP: NEGATIVE
PM MDA: NEGATIVE NG/ML
PM MDEA: NEGATIVE NG/ML
PM MDMA: NEGATIVE NG/ML
PM MEPERIDINE: NEGATIVE NG/ML
PM METHADONE INTERP: NEGATIVE
PM METHADONE: NEGATIVE NG/ML
PM METHAMPHETAMINE: NEGATIVE NG/ML
PM MIDAZOLAM: NEGATIVE NG/ML
PM MORPHINE: >1000 NG/ML
PM NALOXONE: NEGATIVE NG/ML
PM NALTREXONE: NEGATIVE NG/ML
PM NICOTINE INTERP: POSITIVE
PM NORBUPRENORPHINE: NEGATIVE NG/ML
PM NORDIAZEPAM: 492 NG/ML
PM NORMEPERIDINE: NEGATIVE NG/ML
PM NOROXYCODONE: NEGATIVE NG/ML
PM OPIOID INTERP: POSITIVE
PM OXAZEPAM: 441 NG/ML
PM OXXYCODONE INTERP: NEGATIVE
PM OXYCODONE: NEGATIVE NG/ML
PM OXYMORPHONE: NEGATIVE NG/ML
PM PCP: NEGATIVE NG/ML
PM PHENCYCLIDINE INTERP: NEGATIVE
PM PHENOBARBITAL: NEGATIVE NG/ML
PM PPX: NEGATIVE NG/ML
PM PROPOXYPHENE INTERP: NEGATIVE
PM SECOBARBITAL: NEGATIVE NG/ML
PM SUFENTANIL: NEGATIVE NG/ML
PM TAPENTADOL: NEGATIVE NG/ML
PM TEMAZEPAM: 537 NG/ML
PM TRAMADOL INTERP: NEGATIVE
PM TRAMADOL: NEGATIVE NG/ML

## 2023-04-11 ENCOUNTER — OUTPATIENT (OUTPATIENT)
Dept: OUTPATIENT SERVICES | Facility: HOSPITAL | Age: 73
LOS: 1 days | End: 2023-04-11
Payer: COMMERCIAL

## 2023-04-11 ENCOUNTER — APPOINTMENT (OUTPATIENT)
Dept: PSYCHIATRY | Facility: CLINIC | Age: 73
End: 2023-04-11
Payer: OTHER MISCELLANEOUS

## 2023-04-11 DIAGNOSIS — F43.10 POST-TRAUMATIC STRESS DISORDER, UNSPECIFIED: ICD-10-CM

## 2023-04-11 DIAGNOSIS — F41.9 ANXIETY DISORDER, UNSPECIFIED: ICD-10-CM

## 2023-04-11 PROCEDURE — 99214 OFFICE O/P EST MOD 30 MIN: CPT | Mod: 95

## 2023-04-11 PROCEDURE — ZZZZZ: CPT

## 2023-04-12 DIAGNOSIS — F41.9 ANXIETY DISORDER, UNSPECIFIED: ICD-10-CM

## 2023-04-12 DIAGNOSIS — F43.10 POST-TRAUMATIC STRESS DISORDER, UNSPECIFIED: ICD-10-CM

## 2023-04-13 ENCOUNTER — APPOINTMENT (OUTPATIENT)
Dept: PAIN MANAGEMENT | Facility: CLINIC | Age: 73
End: 2023-04-13
Payer: OTHER MISCELLANEOUS

## 2023-04-13 VITALS
WEIGHT: 163 LBS | HEART RATE: 88 BPM | TEMPERATURE: 98.2 F | HEIGHT: 64 IN | SYSTOLIC BLOOD PRESSURE: 126 MMHG | BODY MASS INDEX: 27.83 KG/M2 | DIASTOLIC BLOOD PRESSURE: 86 MMHG

## 2023-04-13 PROCEDURE — 99213 OFFICE O/P EST LOW 20 MIN: CPT | Mod: ACP

## 2023-04-13 NOTE — PHYSICAL EXAM
[de-identified] : GENERAL APPEARANCE OF PATIENT IS WELL DEVELOPED, WELL NOURISHED, BODY HABITUS NORMAL, WELL GROOMED, NO DEFORMITIES NOTED. \par Head - Atraumatic and Normocephalic \par Eyes, Nose, and Throat: External inspection of ears and nose are normal overall without scars, lesions, or masses noted. Assessment of hearing is normal\par Neck-Examination of neck shows no masses, overall appearance is normal, neck is symmetric, tracheal position is midline, no crepitus is noted. Examination of thyroid shows no enlargement, tenderness or masses\par Respiratory- Assessment of respiratory effort shows no intercostal retractions, no use of accessory muscles, unlabored breathing, and normal diaphragmatic movement.\par Cardiovascular- Examination of extremities show no edema or varicosities\par Musculoskeletal. Examination of gait is not antalgic and station is normal\par Inspection and palpation of digits and nails shows no clubbing, cyanosis, nodules, drainage, fluctuance, petechiae\par \par • Spine – 50-60 degrees in flexion. 60 in extension. Greater trochanter tenderness on the right. Bilateral facet tenderness at L3-S1 with pain. \par \par \par • Neck- inspection and palpation shows no misalignment, asymmetry, crepitation, defects, tenderness, masses, effusions. ROM is normal without crepitation or contracture. No instability or subluxation or laxity is noted. No abnormal movements.\par \par \par • RUE- Bicep tendon pain on the right.\par \par \par • LUE- 190-180 degrees of active and passive abduction bilaterally. \par \par \par • RLE- inspection and palpation shows no misalignment, asymmetry, crepitation, defects, tenderness, masses, effusions. ROM is normal without crepitation or contracture. No instability or subluxation or laxity is noted. No abnormal movements.\par \par \par • LLE- inspection and palpation shows no misalignment, asymmetry, crepitation, defects, tenderness, masses, effusions. ROM is normal without crepitation or contracture. No instability or subluxation or laxity is noted. No abnormal movements.\par \par \par • Skin- Inspection of skin and subcutaneous tissue shows no rashes, lesions or ulcers. Palpation of skin and subcutaneous tissue shows no rashes, no indurations, subcutaneous nodules or tightening.\par \par \par • Abdomen- Nontender\par \par \par • Neurologic- CN 2-12 are grossly intact. No sensory or motor deficits in the upper and lower extremities. Adequate strength in upper and lower extremities \par \par \par • Psychiatric- Patient’s judgment and insight are intact. Oriented to time, place and person. Recent and remote memory intact.\par \par

## 2023-04-13 NOTE — ASSESSMENT
[FreeTextEntry1] : Ms. Che is a 72 year-old female familiar to our practice being treated for chronic pain in her lower back and neck. She will continue with above regimen.  She will follow up with us in 4 weeks for a revisit encounter and medication refills. \par \par Disability and Work Status: Totally disabled, permanent.\par \par I have consulted the  registry for the purpose of reviewing the patient's controlled substance.\par \par Overall there is at least a 30-50% reduction in pain with the prescribed analgesics. The patient denies any adverse side effects due to the medication (sleeping disturbance, constipation, sleepiness, hallucinations and/or urination problems). \par \par Familia Zhang PA-C\par Artem Guerrero D.O\par
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
## 2023-04-14 ENCOUNTER — APPOINTMENT (OUTPATIENT)
Dept: PAIN MANAGEMENT | Facility: CLINIC | Age: 73
End: 2023-04-14
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
## 2023-04-19 NOTE — PLAN
[No] : No [Medication education provided] : Medication education provided. [Rationale for medication choices, possible risks/precautions, benefits, alternative treatment choices, and consequences of non-treatment discussed] : Rationale for medication choices, possible risks/precautions, benefits, alternative treatment choices, and consequences of non-treatment discussed with patient/family/caregiver  [FreeTextEntry4] : Goal: Nicotine cessation\par Objective: continue wellbutin support nicotine cessation \par \par Goal: Manage anxiety and panic\par Objective 1: Pt will attend appointments regularly \par Objective 2: pt will be adherent to treatment recommendations\par  [FreeTextEntry5] : PTSD\par -continue Zoloft 25mg po daily\par -continue Trazodone 150mg PO qhs\par \par ELSA with panic attacks\par -continue Valium 10mg PO 1-2 times daily PRN, sent 35 pills via [facility] \par -f/u in 1 month \par \par #nicotine use disorder\par - increase to Wellbutrin  mg PO qdaily\par - nicotine patch declined\par \par The following are noted from pain management:\par Gabapentin 600 MG Oral Tablet; TAKE 1 TABLET Every 8 hours\par Start: HYDROcodone-Acetaminophen  MG Oral Tablet; TAKE 1 TABLET Every 8\par hours MDD:3 tabs\par Start: Morphine Sulfate ER 30 MG Oral Tablet Extended Release (MS Contin); TAKE 1\par TABLET Every twelve hours MDD:2 tabs\par

## 2023-04-19 NOTE — REASON FOR VISIT
[Access issues (e.g., transportation, impaired mobility, etc.)] : due to patient's access issues [Continuing, patient not seen in-person within last 12 months (provide details below)] : Telehealth services are continuing, patient not seen in-person within last 12 months.  [Telehealth (audio & video) - Individual/Group] : This visit was provided via telehealth using real-time 2-way audio visual technology. [Medical Office: ([facility])___] : The provider was located at the medical office in [unfilled]. [Home] : The patient, [unfilled], was located at home, [unfilled], at the time of the visit. [Verbal consent obtained from patient/other participant(s)] : Verbal consent for telehealth/telephonic services obtained from patient/other participant(s) [Patient] : Patient [FreeTextEntry4] : 2:04 [FreeTextEntry5] : 2:36 [FreeTextEntry3] : Patient encouraged to come in person  [FreeTextEntry1] : "maybe it's helping, I'm not sure"

## 2023-04-19 NOTE — HISTORY OF PRESENT ILLNESS
[FreeTextEntry1] : Ms. Che seen, evaluated on Zoom 2/2 Ms. Che is unable to navigate teledoc. Reports continued issues with pain, but that she has upcoming pain management provider. \par \par Reports decreased smoking, now 2 packs per week instead of a pack per day. She does not attribute this to wellbutrin. Reports continued frustration with her daughter.  Reports sleeping well, eating well, continues to report panic when having to leave the house, having reliving her experiences. She denies suicidal ideation, homicidal ideation, seeing or hearing anything unusual.

## 2023-05-08 ENCOUNTER — OUTPATIENT (OUTPATIENT)
Dept: OUTPATIENT SERVICES | Facility: HOSPITAL | Age: 73
LOS: 1 days | End: 2023-05-08
Payer: COMMERCIAL

## 2023-05-08 ENCOUNTER — APPOINTMENT (OUTPATIENT)
Dept: PSYCHIATRY | Facility: CLINIC | Age: 73
End: 2023-05-08
Payer: OTHER MISCELLANEOUS

## 2023-05-08 DIAGNOSIS — F17.200 NICOTINE DEPENDENCE, UNSPECIFIED, UNCOMPLICATED: ICD-10-CM

## 2023-05-08 DIAGNOSIS — F41.9 ANXIETY DISORDER, UNSPECIFIED: ICD-10-CM

## 2023-05-08 DIAGNOSIS — F43.10 POST-TRAUMATIC STRESS DISORDER, UNSPECIFIED: ICD-10-CM

## 2023-05-08 DIAGNOSIS — F41.1 GENERALIZED ANXIETY DISORDER: ICD-10-CM

## 2023-05-08 PROCEDURE — 99214 OFFICE O/P EST MOD 30 MIN: CPT | Mod: 95

## 2023-05-08 PROCEDURE — ZZZZZ: CPT

## 2023-05-08 NOTE — REASON FOR VISIT
[Access issues (e.g., transportation, impaired mobility, etc.)] : due to patient's access issues [Continuing, patient not seen in-person within last 12 months (provide details below)] : Telehealth services are continuing, patient not seen in-person within last 12 months.  [Telehealth (audio & video) - Individual/Group] : This visit was provided via telehealth using real-time 2-way audio visual technology. [Medical Office: ([facility])___] : The provider was located at the medical office in [unfilled]. [Home] : The patient, [unfilled], was located at home, [unfilled], at the time of the visit. [Verbal consent obtained from patient/other participant(s)] : Verbal consent for telehealth/telephonic services obtained from patient/other participant(s) [Patient] : Patient [FreeTextEntry4] : 2:15 [FreeTextEntry5] : 3:00 [FreeTextEntry3] : Patient encouraged to come in person  [FreeTextEntry1] : "I'm a little befuddled"
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
## 2023-05-08 NOTE — PLAN
[No] : No [Medication education provided] : Medication education provided. [Rationale for medication choices, possible risks/precautions, benefits, alternative treatment choices, and consequences of non-treatment discussed] : Rationale for medication choices, possible risks/precautions, benefits, alternative treatment choices, and consequences of non-treatment discussed with patient/family/caregiver  [FreeTextEntry4] : Goal: Nicotine cessation\par Objective: continue wellbutin support nicotine cessation \par \par Goal: Manage anxiety and panic\par Objective 1: Pt will attend appointments regularly \par Objective 2: pt will be adherent to treatment recommendations\par  [FreeTextEntry5] : PTSD\par -continue Trazodone 150mg PO qhs\par \par ELSA with panic attacks\par -continue Valium 10mg PO 1-2 times daily PRN, sent 35 pills via SUNRISE \par -f/u in 1 month \par \par #nicotine use disorder\par - decreased to Wellbutrin  mg PO qdaily\par - nicotine patch declined\par \par The following are noted from pain management:\par Gabapentin 600 MG Oral Tablet; TAKE 1 TABLET Every 8 hours\par Start: HYDROcodone-Acetaminophen  MG Oral Tablet; TAKE 1 TABLET Every 8\par hours MDD:3 tabs\par Start: Morphine Sulfate ER 30 MG Oral Tablet Extended Release (MS Contin); TAKE 1\par TABLET Every twelve hours MDD:2 tabs\par

## 2023-05-08 NOTE — HISTORY OF PRESENT ILLNESS
[FreeTextEntry1] : Ms. Che seen, evaluated on Zoom 2/2 Ms. Che is unable to navigate teledoc. Reports getting connected to a service that has hlped her get her apartment clan and get a TV. Reports 150 mg of wellbutrin made her vomit 3x days so she stopped taking it, but she didn't have an issue with the 100 mg. Reports fibromyalgia pain has been worse. Denies acute depressive symptoms. States she fears going out at night. Denies suicidal ideation, intent, or plan, or homicidal ideation, intent, or plan.

## 2023-05-09 DIAGNOSIS — F41.1 GENERALIZED ANXIETY DISORDER: ICD-10-CM

## 2023-05-09 DIAGNOSIS — F17.200 NICOTINE DEPENDENCE, UNSPECIFIED, UNCOMPLICATED: ICD-10-CM

## 2023-05-09 DIAGNOSIS — F41.9 ANXIETY DISORDER, UNSPECIFIED: ICD-10-CM

## 2023-05-09 DIAGNOSIS — F43.10 POST-TRAUMATIC STRESS DISORDER, UNSPECIFIED: ICD-10-CM

## 2023-05-10 ENCOUNTER — APPOINTMENT (OUTPATIENT)
Dept: PAIN MANAGEMENT | Facility: CLINIC | Age: 73
End: 2023-05-10
Payer: OTHER MISCELLANEOUS

## 2023-05-10 VITALS — WEIGHT: 160 LBS | BODY MASS INDEX: 27.31 KG/M2 | HEIGHT: 64 IN

## 2023-05-10 PROCEDURE — 99213 OFFICE O/P EST LOW 20 MIN: CPT | Mod: ACP

## 2023-05-10 NOTE — ASSESSMENT
[FreeTextEntry1] : Ms. Che is a 72 year-old female with chronic pain in her lower back and neck. We will continue to prescribe MS Contin 30 mg BID and Hydrocodone 10 mg TID. Due to her complaints of bedtime radicular pains we will increase her Gabapentin 600 mg from TID dosing to QID. She will follow up with us in 4 weeks for a revisit encounter and medication refills. \par \par Disability and Work Status: Totally disabled, permanent.\par \par I have consulted the  registry for the purpose of reviewing the patient's controlled substance.\par \par Overall there is at least a 30-50% reduction in pain with the prescribed analgesics. The patient denies any adverse side effects due to the medication (sleeping disturbance, constipation, sleepiness, hallucinations and/or urination problems). \par \par Familia Zhang PA-C\par Artem Guerrero D.O\par
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
## 2023-05-10 NOTE — PHYSICAL EXAM
[de-identified] : GENERAL APPEARANCE OF PATIENT IS WELL DEVELOPED, WELL NOURISHED, BODY HABITUS NORMAL, WELL GROOMED, NO DEFORMITIES NOTED. \par Head - Atraumatic and Normocephalic \par Eyes, Nose, and Throat: External inspection of ears and nose are normal overall without scars, lesions, or masses noted. Assessment of hearing is normal\par Neck-Examination of neck shows no masses, overall appearance is normal, neck is symmetric, tracheal position is midline, no crepitus is noted. Examination of thyroid shows no enlargement, tenderness or masses\par Respiratory- Assessment of respiratory effort shows no intercostal retractions, no use of accessory muscles, unlabored breathing, and normal diaphragmatic movement.\par Cardiovascular- Examination of extremities show no edema or varicosities\par Musculoskeletal. Examination of gait is not antalgic and station is normal\par Inspection and palpation of digits and nails shows no clubbing, cyanosis, nodules, drainage, fluctuance, petechiae\par \par • Spine – 50-60 degrees in flexion. 60 in extension. Greater trochanter tenderness on the right. Bilateral facet tenderness at L3-S1 with pain. \par \par \par • Neck- inspection and palpation shows no misalignment, asymmetry, crepitation, defects, tenderness, masses, effusions. ROM is normal without crepitation or contracture. No instability or subluxation or laxity is noted. No abnormal movements.\par \par \par • RUE- Bicep tendon pain on the right.\par \par \par • LUE- 190-180 degrees of active and passive abduction bilaterally. \par \par \par • RLE- inspection and palpation shows no misalignment, asymmetry, crepitation, defects, tenderness, masses, effusions. ROM is normal without crepitation or contracture. No instability or subluxation or laxity is noted. No abnormal movements.\par \par \par • LLE- inspection and palpation shows no misalignment, asymmetry, crepitation, defects, tenderness, masses, effusions. ROM is normal without crepitation or contracture. No instability or subluxation or laxity is noted. No abnormal movements.\par \par \par • Skin- Inspection of skin and subcutaneous tissue shows no rashes, lesions or ulcers. Palpation of skin and subcutaneous tissue shows no rashes, no indurations, subcutaneous nodules or tightening.\par \par \par • Abdomen- Nontender\par \par \par • Neurologic- CN 2-12 are grossly intact. No sensory or motor deficits in the upper and lower extremities. Adequate strength in upper and lower extremities \par \par \par • Psychiatric- Patient’s judgment and insight are intact. Oriented to time, place and person. Recent and remote memory intact.\par \par

## 2023-06-07 ENCOUNTER — OUTPATIENT (OUTPATIENT)
Dept: OUTPATIENT SERVICES | Facility: HOSPITAL | Age: 73
LOS: 1 days | End: 2023-06-07
Payer: COMMERCIAL

## 2023-06-07 ENCOUNTER — APPOINTMENT (OUTPATIENT)
Dept: PSYCHIATRY | Facility: CLINIC | Age: 73
End: 2023-06-07
Payer: OTHER MISCELLANEOUS

## 2023-06-07 DIAGNOSIS — F43.10 POST-TRAUMATIC STRESS DISORDER, UNSPECIFIED: ICD-10-CM

## 2023-06-07 DIAGNOSIS — F41.9 ANXIETY DISORDER, UNSPECIFIED: ICD-10-CM

## 2023-06-07 PROCEDURE — ZZZZZ: CPT

## 2023-06-07 PROCEDURE — 99215 OFFICE O/P EST HI 40 MIN: CPT

## 2023-06-07 RX ORDER — BUPROPION HYDROCHLORIDE 100 MG/1
100 TABLET, FILM COATED, EXTENDED RELEASE ORAL DAILY
Qty: 30 | Refills: 0 | Status: DISCONTINUED | COMMUNITY
Start: 2023-03-15 | End: 2023-06-07

## 2023-06-08 DIAGNOSIS — F43.10 POST-TRAUMATIC STRESS DISORDER, UNSPECIFIED: ICD-10-CM

## 2023-06-08 DIAGNOSIS — F41.9 ANXIETY DISORDER, UNSPECIFIED: ICD-10-CM

## 2023-06-14 ENCOUNTER — APPOINTMENT (OUTPATIENT)
Dept: PAIN MANAGEMENT | Facility: CLINIC | Age: 73
End: 2023-06-14
Payer: OTHER MISCELLANEOUS

## 2023-06-14 VITALS — BODY MASS INDEX: 27.31 KG/M2 | WEIGHT: 160 LBS | HEIGHT: 64 IN

## 2023-06-14 VITALS — BODY MASS INDEX: 27.31 KG/M2 | HEIGHT: 64 IN | WEIGHT: 160 LBS

## 2023-06-14 PROCEDURE — 99213 OFFICE O/P EST LOW 20 MIN: CPT | Mod: ACP

## 2023-06-14 PROCEDURE — 80305 DRUG TEST PRSMV DIR OPT OBS: CPT | Mod: QW
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
## 2023-06-14 NOTE — ASSESSMENT
[FreeTextEntry1] : Ms. Che is a 72 year-old female with chronic pain in her lower back and neck. She is medically managed at our office with MS Contin 30 mg BID, Hydrocodone 10 mg TID for breakthrough pain along with Gabapentin 600 mg QID. She will follow up with us in 4 weeks for a revisit encounter and medication refills. \par \par Disability and Work Status: Totally disabled, permanent.\par \par I have consulted the  registry for the purpose of reviewing the patient's controlled substance.\par \par Overall there is at least a 30-50% reduction in pain with the prescribed analgesics. The patient denies any adverse side effects due to the medication (sleeping disturbance, constipation, sleepiness, hallucinations and/or urination problems). \par \par Urine drug screening collected today with rapid sample result consistent with given regimen. Sample to be sent for confirmatory testing.\par \par Familia Zhang PA-C\par Artem Guerrero D.O\par

## 2023-06-14 NOTE — PHYSICAL EXAM
[de-identified] : GENERAL APPEARANCE OF PATIENT IS WELL DEVELOPED, WELL NOURISHED, BODY HABITUS NORMAL, WELL GROOMED, NO DEFORMITIES NOTED. \par Head - Atraumatic and Normocephalic \par Eyes, Nose, and Throat: External inspection of ears and nose are normal overall without scars, lesions, or masses noted. Assessment of hearing is normal\par Neck-Examination of neck shows no masses, overall appearance is normal, neck is symmetric, tracheal position is midline, no crepitus is noted. Examination of thyroid shows no enlargement, tenderness or masses\par Respiratory- Assessment of respiratory effort shows no intercostal retractions, no use of accessory muscles, unlabored breathing, and normal diaphragmatic movement.\par Cardiovascular- Examination of extremities show no edema or varicosities\par Musculoskeletal. Examination of gait is not antalgic and station is normal\par Inspection and palpation of digits and nails shows no clubbing, cyanosis, nodules, drainage, fluctuance, petechiae\par \par • Spine – 50-60 degrees in flexion. 60 in extension. Greater trochanter tenderness on the right. Bilateral facet tenderness at L3-S1 with pain. \par \par \par • Neck- inspection and palpation shows no misalignment, asymmetry, crepitation, defects, tenderness, masses, effusions. ROM is normal without crepitation or contracture. No instability or subluxation or laxity is noted. No abnormal movements.\par \par \par • RUE- Bicep tendon pain on the right.\par \par \par • LUE- 190-180 degrees of active and passive abduction bilaterally. \par \par \par • RLE- inspection and palpation shows no misalignment, asymmetry, crepitation, defects, tenderness, masses, effusions. ROM is normal without crepitation or contracture. No instability or subluxation or laxity is noted. No abnormal movements.\par \par \par • LLE- inspection and palpation shows no misalignment, asymmetry, crepitation, defects, tenderness, masses, effusions. ROM is normal without crepitation or contracture. No instability or subluxation or laxity is noted. No abnormal movements.\par \par \par • Skin- Inspection of skin and subcutaneous tissue shows no rashes, lesions or ulcers. Palpation of skin and subcutaneous tissue shows no rashes, no indurations, subcutaneous nodules or tightening.\par \par \par • Abdomen- Nontender\par \par \par • Neurologic- CN 2-12 are grossly intact. No sensory or motor deficits in the upper and lower extremities. Adequate strength in upper and lower extremities \par \par \par • Psychiatric- Patient’s judgment and insight are intact. Oriented to time, place and person. Recent and remote memory intact.\par \par

## 2023-06-20 NOTE — HISTORY OF PRESENT ILLNESS
[FreeTextEntry1] : Spent 30 minutes reviewing how to use her phone to use teledoc. Reports using Valium 3-5 days a week; takes 1-2 when going out. Reports trying to get out more with her girlfriends. Reports still smoking every day, but that she is using less. Reports fibromyalgia pain has been worse. Denies acute depressive symptoms. States she fears going out at night. Denies suicidal ideation, intent, or plan, or homicidal ideation, intent, or plan. \par \par Session started 2:45; ended 3:45

## 2023-06-20 NOTE — PLAN
[No] : No [Medication education provided] : Medication education provided. [Rationale for medication choices, possible risks/precautions, benefits, alternative treatment choices, and consequences of non-treatment discussed] : Rationale for medication choices, possible risks/precautions, benefits, alternative treatment choices, and consequences of non-treatment discussed with patient/family/caregiver  [Order(s) for medication placed in [facility] EHR] : Medication [FreeTextEntry4] : Goal: Nicotine cessation\par Objective: continue wellbutin support nicotine cessation \par \par Goal: Manage anxiety and panic\par Objective 1: Pt will attend appointments regularly \par Objective 2: pt will be adherent to treatment recommendations\par  [FreeTextEntry5] : PTSD\par -continue Trazodone 150mg PO qhs\par \par ELSA with panic attacks\par -continue Valium 10mg PO 1-2 times daily PRN, sent 35 pills via [facility] \par -f/u in 1 month \par \par #nicotine use disorder\par - continue Wellbutrin  mg PO qdaily\par - nicotine patch declined\par \par The following are noted from pain management:\par Gabapentin 600 MG Oral Tablet; TAKE 1 TABLET Every 8 hours\par Start: HYDROcodone-Acetaminophen  MG Oral Tablet; TAKE 1 TABLET Every 8\par hours MDD:3 tabs\par Start: Morphine Sulfate ER 30 MG Oral Tablet Extended Release (MS Contin); TAKE 1\par TABLET Every twelve hours MDD:2 tabs\par
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
## 2023-07-10 ENCOUNTER — NON-APPOINTMENT (OUTPATIENT)
Age: 73
End: 2023-07-10

## 2023-07-10 ENCOUNTER — OUTPATIENT (OUTPATIENT)
Dept: OUTPATIENT SERVICES | Facility: HOSPITAL | Age: 73
LOS: 1 days | End: 2023-07-10
Payer: COMMERCIAL

## 2023-07-10 ENCOUNTER — APPOINTMENT (OUTPATIENT)
Dept: PSYCHIATRY | Facility: CLINIC | Age: 73
End: 2023-07-10
Payer: OTHER MISCELLANEOUS

## 2023-07-10 DIAGNOSIS — F43.10 POST-TRAUMATIC STRESS DISORDER, UNSPECIFIED: ICD-10-CM

## 2023-07-10 DIAGNOSIS — F17.200 NICOTINE DEPENDENCE, UNSPECIFIED, UNCOMPLICATED: ICD-10-CM

## 2023-07-10 DIAGNOSIS — F41.1 GENERALIZED ANXIETY DISORDER: ICD-10-CM

## 2023-07-10 DIAGNOSIS — F41.0 GENERALIZED ANXIETY DISORDER: ICD-10-CM

## 2023-07-10 PROCEDURE — ZZZZZ: CPT

## 2023-07-10 PROCEDURE — 99212 OFFICE O/P EST SF 10 MIN: CPT | Mod: 95

## 2023-07-10 RX ORDER — SERTRALINE 25 MG/1
25 TABLET, FILM COATED ORAL DAILY
Qty: 30 | Refills: 0 | Status: DISCONTINUED | COMMUNITY
Start: 2021-11-15 | End: 2023-07-10

## 2023-07-10 NOTE — PLAN
[No] : No [Medication education provided] : Medication education provided. [Rationale for medication choices, possible risks/precautions, benefits, alternative treatment choices, and consequences of non-treatment discussed] : Rationale for medication choices, possible risks/precautions, benefits, alternative treatment choices, and consequences of non-treatment discussed with patient/family/caregiver  [Order(s) for medication placed in [facility] EHR] : Medication [FreeTextEntry4] : Goal: Nicotine cessation\par Objective: continue wellbutin support nicotine cessation \par \par Goal: Manage anxiety and panic\par Objective 1: Pt will attend appointments regularly \par Objective 2: pt will be adherent to treatment recommendations\par  [FreeTextEntry5] : PTSD\par -continue Trazodone 150mg PO qhs\par \par ELSA with panic attacks\par -continue Valium 10mg PO 1-2 times daily PRN, sent 35 pills via [facility] \par \par #nicotine use disorder\par - continue Wellbutrin  mg PO qdaily\par - nicotine patch discussed\par - pt states that she will start chantix on 7/11/23\par \par The following are noted from pain management:\par Gabapentin 600 MG Oral Tablet; TAKE 1 TABLET Every 8 hours\par Start: HYDROcodone-Acetaminophen  MG Oral Tablet; TAKE 1 TABLET Every 8\par hours MDD:3 tabs\par Start: Morphine Sulfate ER 30 MG Oral Tablet Extended Release (MS Contin); TAKE 1\par TABLET Every twelve hours MDD:2 tabs\par \par #RTC\par - f/u in 1 month\par - next apt scheduled for 08/07/23 3pm

## 2023-07-10 NOTE — PHYSICAL EXAM
[FreeTextEntry2] : not directly observed [FreeTextEntry3] : not formally assessed [Well groomed] : well groomed [Average] : average [Cooperative] : cooperative [Anxious] : anxious [Irritable] : irritable [Full] : full [Rapid] : rapid [Linear/Goal Directed] : linear/goal directed [None] : none [None Reported] : none reported [WNL] : within normal limits

## 2023-07-10 NOTE — HISTORY OF PRESENT ILLNESS
[FreeTextEntry1] : Pt was scheduled for virtual appointment today, but did not show up. Attempted to re-send the virtual link to pt's listed phone numbers. Called back pt's cell number multiple times, but unable to reach. Left VM requesting callback to reschedule appointment.

## 2023-07-10 NOTE — HISTORY OF PRESENT ILLNESS
[FreeTextEntry1] : Pt originally was considered a no-show for teleheath appointment, but pt called back office and provider was able to reach pt on phone number after 2 attempts ([phone]).\par \par Attempted to perform psychiatric interview via telehealth again, but was unsuccessful. Per note, provider had previously attempted to help pt set up teladoc software for at least 30 minutes without success. Pt states that teladoc software does not work for her and requested to have interview done over the phone instead. \par \par Pateient reports that she has a chronic hx of "high anxiety" and "PTSD." Patient indicates that she typically takes at valium 10 mg qdaily PRN, and recently has only been taking the medication at most once per day. Patinet states that she had previously been taking the valium  three times a day and weaned herself off of the medication over time. Patient denies acute side effects of benzo withdrawals at this time. Patent states that she is "chronically disabled and homebound," which significantly limits her activities, and prevents her from being able to come into the office in person. Patient states, "my medicaitons are working" and denies acute side effects at this time. Patinet that she still smoking every day,and is interested in cutting down on cigarette use - discussed nicotine patch and chantix. Reports continued fibromyalgia pain . Denies acute depressive symptoms. States she fears going out at night. Denies suicidal ideation, intent, or plan, or homicidal ideation, intent, or plan.

## 2023-07-10 NOTE — REASON FOR VISIT
[Patient preference] : as per patient preference [Access issues (e.g., transportation, impaired mobility, etc.)] : due to patient's access issues [Technical or other issues] : Patient unable to effectively utilize tele-video due to technical or other issues. [FreeTextEntry4] : 2:30 PM [FreeTextEntry5] : 3:00 PM [FreeTextEntry1] : "I have high anxiety"
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
## 2023-07-11 DIAGNOSIS — F41.1 GENERALIZED ANXIETY DISORDER: ICD-10-CM

## 2023-07-11 DIAGNOSIS — F43.10 POST-TRAUMATIC STRESS DISORDER, UNSPECIFIED: ICD-10-CM

## 2023-07-12 ENCOUNTER — LABORATORY RESULT (OUTPATIENT)
Age: 73
End: 2023-07-12

## 2023-07-12 ENCOUNTER — APPOINTMENT (OUTPATIENT)
Dept: PAIN MANAGEMENT | Facility: CLINIC | Age: 73
End: 2023-07-12
Payer: OTHER MISCELLANEOUS

## 2023-07-12 VITALS — HEIGHT: 64 IN | WEIGHT: 160 LBS | BODY MASS INDEX: 27.31 KG/M2

## 2023-07-12 LAB
AMP / AMPHETAMINE: NEGATIVE
BAR / SECOBARBITAL: NEGATIVE
BUP / BUPRENORPHINE: NEGATIVE
BZO / OXAZEPAM: POSITIVE
COC / COCAINE: NEGATIVE
CREATININE: 50 MG/DL
MDMA / METHYLENEDIOXYMETHAMPHETAMINE: NEGATIVE
MET / METHAMPHETAMINES: NEGATIVE
MOP / MORPHINE: POSITIVE
MTD / METHADONE: NEGATIVE
OXY / OXYCODONE: NEGATIVE
PCP / PHENCYCLIDINE: NEGATIVE
PH: 5
SPECIFIC GRAVITY: 1.02
TEMPERATURE: 92 C
THC / MARIJUANA: NEGATIVE

## 2023-07-12 PROCEDURE — 99213 OFFICE O/P EST LOW 20 MIN: CPT | Mod: ACP

## 2023-07-12 PROCEDURE — 80305 DRUG TEST PRSMV DIR OPT OBS: CPT | Mod: QW

## 2023-07-12 NOTE — PHYSICAL EXAM
[de-identified] : GENERAL APPEARANCE OF PATIENT IS WELL DEVELOPED, WELL NOURISHED, BODY HABITUS NORMAL, WELL GROOMED, NO DEFORMITIES NOTED. \par Head - Atraumatic and Normocephalic \par Eyes, Nose, and Throat: External inspection of ears and nose are normal overall without scars, lesions, or masses noted. Assessment of hearing is normal\par Neck-Examination of neck shows no masses, overall appearance is normal, neck is symmetric, tracheal position is midline, no crepitus is noted. Examination of thyroid shows no enlargement, tenderness or masses\par Respiratory- Assessment of respiratory effort shows no intercostal retractions, no use of accessory muscles, unlabored breathing, and normal diaphragmatic movement.\par Cardiovascular- Examination of extremities show no edema or varicosities\par Musculoskeletal. Examination of gait is not antalgic and station is normal\par Inspection and palpation of digits and nails shows no clubbing, cyanosis, nodules, drainage, fluctuance, petechiae\par \par • Spine – 50-60 degrees in flexion. 60 in extension. Greater trochanter tenderness on the right. Bilateral facet tenderness at L3-S1 with pain. \par \par \par • Neck- inspection and palpation shows no misalignment, asymmetry, crepitation, defects, tenderness, masses, effusions. ROM is normal without crepitation or contracture. No instability or subluxation or laxity is noted. No abnormal movements.\par \par \par • RUE- Bicep tendon pain on the right.\par \par \par • LUE- 190-180 degrees of active and passive abduction bilaterally. \par \par \par • RLE- inspection and palpation shows no misalignment, asymmetry, crepitation, defects, tenderness, masses, effusions. ROM is normal without crepitation or contracture. No instability or subluxation or laxity is noted. No abnormal movements.\par \par \par • LLE- inspection and palpation shows no misalignment, asymmetry, crepitation, defects, tenderness, masses, effusions. ROM is normal without crepitation or contracture. No instability or subluxation or laxity is noted. No abnormal movements.\par \par \par • Skin- Inspection of skin and subcutaneous tissue shows no rashes, lesions or ulcers. Palpation of skin and subcutaneous tissue shows no rashes, no indurations, subcutaneous nodules or tightening.\par \par \par • Abdomen- Nontender\par \par \par • Neurologic- CN 2-12 are grossly intact. No sensory or motor deficits in the upper and lower extremities. Adequate strength in upper and lower extremities \par \par \par • Psychiatric- Patient’s judgment and insight are intact. Oriented to time, place and person. Recent and remote memory intact.\par \par
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
## 2023-07-12 NOTE — ASSESSMENT
[FreeTextEntry1] : Ms. Che is a 72 year-old female with chronic pain in her lower back and neck. She is utilizing MS Contin 30 mg BID, Hydrocodone 10 mg TID for breakthrough pain along with Gabapentin 600 mg QID. The patient is stable on current pain medication with analgesia and without notable side effects or any obvious aberrant behaviors exhibited. Will renew medication today. She will follow up with us in 4 weeks for a revisit encounter.\par \par Disability and Work Status: Totally disabled, permanent.\par \par I have consulted the  registry for the purpose of reviewing the patient's controlled substance.\par \par Overall there is at least a 30-50% reduction in pain with the prescribed analgesics. The patient denies any adverse side effects due to the medication (sleeping disturbance, constipation, sleepiness, hallucinations and/or urination problems). \par \par Urine drug screening collected today with rapid sample result consistent with given regimen. Sample to be sent for confirmatory testing.\par \par Familia Zhang PA-C\par Artem Guerrero D.O\par
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
## 2023-07-19 NOTE — PLAN
[No] : No [Medication education provided] : Medication education provided. [Rationale for medication choices, possible risks/precautions, benefits, alternative treatment choices, and consequences of non-treatment discussed] : Rationale for medication choices, possible risks/precautions, benefits, alternative treatment choices, and consequences of non-treatment discussed with patient/family/caregiver  [Order(s) for medication placed in [facility] EHR] : Medication [FreeTextEntry4] : Goal: Nicotine cessation\par Objective: continue wellbutin support nicotine cessation \par \par Goal: Manage anxiety and panic\par Objective 1: Pt will attend appointments regularly \par Objective 2: pt will be adherent to treatment recommendations\par  [FreeTextEntry5] : PTSD\par -continue Trazodone 150mg PO qhs\par \par ELSA with panic attacks\par -continue Valium 10mg PO 1-2 times daily PRN\par -f/u in 1 month \par \par #nicotine use disorder\par - continue Wellbutrin  mg PO qdaily\par - nicotine patch declined\par \par The following are noted from pain management:\par Gabapentin 600 MG Oral Tablet; TAKE 1 TABLET Every 8 hours\par Start: HYDROcodone-Acetaminophen  MG Oral Tablet; TAKE 1 TABLET Every 8\par hours MDD:3 tabs\par Start: Morphine Sulfate ER 30 MG Oral Tablet Extended Release (MS Contin); TAKE 1\par TABLET Every twelve hours MDD:2 tabs\par \par ISTOP reviewed\par Recent scripts as follows:Reference #: [id number]\par A	Y	O	06/14/2023	06/22/2023	morphine sulf er 30 mg tablet	60	30	SawiresFamilia	[id number]	[employer]	[id number]\par A	Y	O	06/14/2023	06/14/2023	hydrocodone-acetaminophen  mg tablet	90	30	Familia Zhang	[id number]	[employer]	[id number]\par A	Y	B	06/07/2023	06/14/2023	diazepam 10 mg tablet	35	30	[facility]	[id number]	[employer]	[id number]

## 2023-07-24 LAB
PM 6 MAM: NEGATIVE NG/ML
PM 7-AMINO-CLONAZ: NEGATIVE NG/ML
PM ALPHA-HYDROXY-ALPRAZOLAM: NEGATIVE NG/ML
PM ALPHA-HYDROXY-MIDAZOLAM: NEGATIVE NG/ML
PM ALPRAZOLAM: NEGATIVE NG/ML
PM AMOBARBITAL: NEGATIVE NG/ML
PM AMPHETAMINE INTERP: NEGATIVE
PM AMPHETAMINE: NEGATIVE NG/ML
PM BARBURATES INTERP: NEGATIVE
PM BEG: NEGATIVE NG/ML
PM BENZODIAZEPINES INTERP: POSITIVE
PM BUPRENORPHINE INTERP: NEGATIVE
PM BUPRENORPHINE: NEGATIVE NG/ML
PM BUTALBITAL: NEGATIVE NG/ML
PM CLONAZEPAM: NEGATIVE NG/ML
PM COCAINE INTERP: NEGATIVE
PM COCAINE: NEGATIVE NG/ML
PM CODIENE: NEGATIVE NG/ML
PM COTININE: 1594 NG/ML
PM DIAZEPAM: NEGATIVE NG/ML
PM DIHYROCODEINE: 611 NG/ML
PM EDDP: NEGATIVE NG/ML
PM FENTANYL INTERP: NEGATIVE
PM FENTANYL: NEGATIVE NG/ML
PM FLUNITRAZEPAM: NEGATIVE NG/ML
PM FLURAZEPAM: NEGATIVE NG/ML
PM HYDROCODONE: 2957 NG/ML
PM HYDROMORPHONE: 198 NG/ML
PM LORAZEPAM: NEGATIVE NG/ML
PM MARIJUANA (DELTA-9-THC): NEGATIVE NG/ML
PM MARIJUANA INTERP: NEGATIVE
PM MDA: NEGATIVE NG/ML
PM MDEA: NEGATIVE NG/ML
PM MDMA: NEGATIVE NG/ML
PM MEPERIDINE: NEGATIVE NG/ML
PM METHADONE INTERP: NEGATIVE
PM METHADONE: NEGATIVE NG/ML
PM METHAMPHETAMINE: NEGATIVE NG/ML
PM MIDAZOLAM: NEGATIVE NG/ML
PM MORPHINE: ABNORMAL NG/ML
PM NALOXONE: NEGATIVE NG/ML
PM NALTREXONE: NEGATIVE NG/ML
PM NICOTINE INTERP: POSITIVE
PM NORBUPRENORPHINE: NEGATIVE NG/ML
PM NORDIAZEPAM: 2026 NG/ML
PM NORMEPERIDINE: NEGATIVE NG/ML
PM NOROXYCODONE: NEGATIVE NG/ML
PM OPIOID INTERP: POSITIVE
PM OXAZEPAM: 962 NG/ML
PM OXXYCODONE INTERP: NEGATIVE
PM OXYCODONE: NEGATIVE NG/ML
PM OXYMORPHONE: NEGATIVE NG/ML
PM PCP: NEGATIVE NG/ML
PM PHENCYCLIDINE INTERP: NEGATIVE
PM PHENOBARBITAL: NEGATIVE NG/ML
PM PPX: NEGATIVE NG/ML
PM PROPOXYPHENE INTERP: NEGATIVE
PM SECOBARBITAL: NEGATIVE NG/ML
PM SUFENTANIL: NEGATIVE NG/ML
PM TAPENTADOL: NEGATIVE NG/ML
PM TEMAZEPAM: 1726 NG/ML
PM TRAMADOL INTERP: NEGATIVE
PM TRAMADOL: NEGATIVE NG/ML

## 2023-08-07 ENCOUNTER — APPOINTMENT (OUTPATIENT)
Dept: PSYCHIATRY | Facility: CLINIC | Age: 73
End: 2023-08-07
Payer: OTHER MISCELLANEOUS

## 2023-08-07 ENCOUNTER — NON-APPOINTMENT (OUTPATIENT)
Age: 73
End: 2023-08-07

## 2023-08-07 ENCOUNTER — OUTPATIENT (OUTPATIENT)
Dept: OUTPATIENT SERVICES | Facility: HOSPITAL | Age: 73
LOS: 1 days | End: 2023-08-07
Payer: COMMERCIAL

## 2023-08-07 DIAGNOSIS — F41.1 GENERALIZED ANXIETY DISORDER: ICD-10-CM

## 2023-08-07 DIAGNOSIS — F41.9 ANXIETY DISORDER, UNSPECIFIED: ICD-10-CM

## 2023-08-07 PROCEDURE — 99213 OFFICE O/P EST LOW 20 MIN: CPT | Mod: 95

## 2023-08-07 PROCEDURE — ZZZZZ: CPT

## 2023-08-07 NOTE — HISTORY OF PRESENT ILLNESS
[FreeTextEntry1] : CC: "I'm Ok"  Interview conducted over phone after multiple unsuccessful attempts to use teladoc.   Patient endorses stable mood. Patient indicates that her psychiatric medications are working well for her but complains of her pain medications (managed by pain specialist). . Patient endorses that "sleep is good," attributed to her trazodone, but feels that the wellbutrin is making her sleepy. Patient was informed that feeling tired would be an unusual side effect of wellbutrin (more common side effect is increased energy), but that she can try taking the medication later in the day. Patient denies suicidal ideation, homicidal ideation, or thoughts of self-harm. Patient denies seeing or hearing anything unusual. Patient reports compliance with medications, denies medication side effects.

## 2023-08-07 NOTE — PLAN
[No] : No [Medication education provided] : Medication education provided. [Rationale for medication choices, possible risks/precautions, benefits, alternative treatment choices, and consequences of non-treatment discussed] : Rationale for medication choices, possible risks/precautions, benefits, alternative treatment choices, and consequences of non-treatment discussed with patient/family/caregiver  [Order(s) for medication placed in [facility] EHR] : Medication [FreeTextEntry4] : Goal: Nicotine cessation Objective: continue wellbutin support nicotine cessation   Goal: Manage anxiety and panic Objective 1: Pt will attend appointments regularly  Objective 2: pt will be adherent to treatment recommendations  [FreeTextEntry5] : PTSD -continue Trazodone 150mg PO qhs  ELSA with panic attacks -continue Valium 10mg PO 1-2 times daily PRN (35 pills/30d) -f/u in 1 month   #nicotine use disorder - continue Wellbutrin  mg PO qdaily - nicotine patch declined  The following are noted from pain management: Gabapentin 600 MG Oral Tablet; TAKE 1 TABLET Every 8 hours Start: HYDROcodone-Acetaminophen  MG Oral Tablet; TAKE 1 TABLET Every 8 hours MDD:3 tabs Start: Morphine Sulfate ER 30 MG Oral Tablet Extended Release (MS Contin); TAKE 1 TABLET Every twelve hours MDD:2 tabs  ISTOP reviewed

## 2023-08-08 DIAGNOSIS — F41.9 ANXIETY DISORDER, UNSPECIFIED: ICD-10-CM

## 2023-08-09 ENCOUNTER — APPOINTMENT (OUTPATIENT)
Dept: PAIN MANAGEMENT | Facility: CLINIC | Age: 73
End: 2023-08-09
Payer: OTHER MISCELLANEOUS

## 2023-08-09 VITALS — BODY MASS INDEX: 27.31 KG/M2 | WEIGHT: 160 LBS | HEIGHT: 64 IN

## 2023-08-09 PROCEDURE — 99213 OFFICE O/P EST LOW 20 MIN: CPT | Mod: ACP
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
## 2023-08-09 NOTE — ASSESSMENT
[FreeTextEntry1] : Ms. Che is a 72 year-old female with chronic pain in her lower back and neck. She is medically managed at our office with adequate pain control in the form of MS Contin 30 mg BID, Hydrocodone 10 mg TID for breakthrough pain along with Gabapentin 600 mg QID. She is utilizing Methocarbamol 750 mg PRN for spasms. Patient will f/u in 4 weeks for further evaluation.  Disability and Work Status: Totally disabled, permanent.  I have consulted the  registry for the purpose of reviewing the patient's controlled substance.  Overall there is at least a 30-50% reduction in pain with the prescribed analgesics. The patient denies any adverse side effects due to the medication (sleeping disturbance, constipation, sleepiness, hallucinations and/or urination problems).   ELICEO Maloney D.O

## 2023-08-09 NOTE — DISCUSSION/SUMMARY
[Medication Risks Reviewed] : Medication risks reviewed [de-identified] : \par  \par  \par  \par

## 2023-08-09 NOTE — PHYSICAL EXAM
[de-identified] : GENERAL APPEARANCE OF PATIENT IS WELL DEVELOPED, WELL NOURISHED, BODY HABITUS NORMAL, WELL GROOMED, NO DEFORMITIES NOTED. \par  Head - Atraumatic and Normocephalic \par  Eyes, Nose, and Throat: External inspection of ears and nose are normal overall without scars, lesions, or masses noted. Assessment of hearing is normal\par  Neck-Examination of neck shows no masses, overall appearance is normal, neck is symmetric, tracheal position is midline, no crepitus is noted. Examination of thyroid shows no enlargement, tenderness or masses\par  Respiratory- Assessment of respiratory effort shows no intercostal retractions, no use of accessory muscles, unlabored breathing, and normal diaphragmatic movement.\par  Cardiovascular- Examination of extremities show no edema or varicosities\par  Musculoskeletal. Examination of gait is not antalgic and station is normal\par  Inspection and palpation of digits and nails shows no clubbing, cyanosis, nodules, drainage, fluctuance, petechiae\par  \par  - Spine - 50-60 degrees in flexion. 60 in extension. Greater trochanter tenderness on the right. Bilateral facet tenderness at L3-S1 with pain. \par  \par  \par  - Neck- inspection and palpation shows no misalignment, asymmetry, crepitation, defects, tenderness, masses, effusions. ROM is normal without crepitation or contracture. No instability or subluxation or laxity is noted. No abnormal movements.\par  \par  \par  - RUE- Bicep tendon pain on the right.\par  \par  \par  - LUE- 190-180 degrees of active and passive abduction bilaterally. \par  \par  \par  - RLE- inspection and palpation shows no misalignment, asymmetry, crepitation, defects, tenderness, masses, effusions. ROM is normal without crepitation or contracture. No instability or subluxation or laxity is noted. No abnormal movements.\par  \par  \par  - LLE- inspection and palpation shows no misalignment, asymmetry, crepitation, defects, tenderness, masses, effusions. ROM is normal without crepitation or contracture. No instability or subluxation or laxity is noted. No abnormal movements.\par  \par  \par  - Skin- Inspection of skin and subcutaneous tissue shows no rashes, lesions or ulcers. Palpation of skin and subcutaneous tissue shows no rashes, no indurations, subcutaneous nodules or tightening.\par  \par  \par  - Abdomen- Nontender\par  \par  \par  - Neurologic- CN 2-12 are grossly intact. No sensory or motor deficits in the upper and lower extremities. Adequate strength in upper and lower extremities \par  \par  \par  - Psychiatric- Patient's judgment and insight are intact. Oriented to time, place and person. Recent and remote memory intact.\par  \par

## 2023-09-07 ENCOUNTER — APPOINTMENT (OUTPATIENT)
Dept: PSYCHIATRY | Facility: CLINIC | Age: 73
End: 2023-09-07
Payer: OTHER MISCELLANEOUS

## 2023-09-07 ENCOUNTER — OUTPATIENT (OUTPATIENT)
Dept: OUTPATIENT SERVICES | Facility: HOSPITAL | Age: 73
LOS: 1 days | End: 2023-09-07
Payer: OTHER MISCELLANEOUS

## 2023-09-07 DIAGNOSIS — F41.1 GENERALIZED ANXIETY DISORDER: ICD-10-CM

## 2023-09-07 PROCEDURE — 99213 OFFICE O/P EST LOW 20 MIN: CPT | Mod: 95

## 2023-09-07 PROCEDURE — ZZZZZ: CPT

## 2023-09-07 NOTE — HISTORY OF PRESENT ILLNESS
[FreeTextEntry1] : CC: "I have anxiety and PTSD"   Interview conducted over phone after multiple unsuccessful attempts to use [employer] or alternative televisual software.   Patient endorses stable mood but complains of continued chronic symptoms of anxiety and reported PTSD. Patient endorses that her daughter reportedly suggested that she go to a custodial home, and the patient was opposed to this idea. Patient states that she stopped taking wellbutrin because she feels that it wasn't helping her. Patient states that trazodone has been working well for her sleep. States that she feels that her medication regimen is working well fo rher. States that she continues to take valium for reported panic attacks. Patient denies suicidal ideation, homicidal ideation, or thoughts of self-harm. Patient denies seeing or hearing anything unusual. Patient reports compliance with medications, denies medication side effects.

## 2023-09-07 NOTE — PLAN
[No] : No [Medication education provided] : Medication education provided. [Rationale for medication choices, possible risks/precautions, benefits, alternative treatment choices, and consequences of non-treatment discussed] : Rationale for medication choices, possible risks/precautions, benefits, alternative treatment choices, and consequences of non-treatment discussed with patient/family/caregiver  [Order(s) for medication placed in [facility] EHR] : Medication [FreeTextEntry4] : Goal: Nicotine cessation Objective: continue wellbutin support nicotine cessation   Goal: Manage anxiety and panic Objective 1: Pt will attend appointments regularly  Objective 2: pt will be adherent to treatment recommendations  [FreeTextEntry5] : PTSD -continue Trazodone 150mg PO qhs ELSA with panic attacks -continue Valium 10mg PO 1-2 times daily PRN (35 pills/30d) -f/u in 1 month   #nicotine use disorder - pt no longer taking Wellbutrin  mg PO qdaily - nicotine patch declined  The following are noted from pain management: Gabapentin 600 MG Oral Tablet; TAKE 1 TABLET Every 8 hours Start: HYDROcodone-Acetaminophen  MG Oral Tablet; TAKE 1 TABLET Every 8 hours MDD:3 tabs Start: Morphine Sulfate ER 30 MG Oral Tablet Extended Release (MS Contin); TAKE 1 TABLET Every twelve hours MDD:2 tabs  ISTOP reviewed
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
## 2023-09-08 DIAGNOSIS — F41.1 GENERALIZED ANXIETY DISORDER: ICD-10-CM

## 2023-09-13 ENCOUNTER — APPOINTMENT (OUTPATIENT)
Dept: PAIN MANAGEMENT | Facility: CLINIC | Age: 73
End: 2023-09-13
Payer: OTHER MISCELLANEOUS

## 2023-09-13 VITALS — WEIGHT: 160 LBS | BODY MASS INDEX: 27.31 KG/M2 | HEIGHT: 64 IN

## 2023-09-13 PROCEDURE — 99214 OFFICE O/P EST MOD 30 MIN: CPT | Mod: ACP

## 2023-10-05 ENCOUNTER — OUTPATIENT (OUTPATIENT)
Dept: OUTPATIENT SERVICES | Facility: HOSPITAL | Age: 73
LOS: 1 days | End: 2023-10-05
Payer: MEDICARE

## 2023-10-05 ENCOUNTER — APPOINTMENT (OUTPATIENT)
Dept: PSYCHIATRY | Facility: CLINIC | Age: 73
End: 2023-10-05
Payer: MEDICARE

## 2023-10-05 DIAGNOSIS — F41.9 ANXIETY DISORDER, UNSPECIFIED: ICD-10-CM

## 2023-10-05 PROCEDURE — 99215 OFFICE O/P EST HI 40 MIN: CPT | Mod: 95

## 2023-10-05 PROCEDURE — ZZZZZ: CPT

## 2023-10-05 RX ORDER — BUPROPION HYDROCHLORIDE 100 MG/1
100 TABLET, FILM COATED, EXTENDED RELEASE ORAL DAILY
Qty: 30 | Refills: 1 | Status: DISCONTINUED | COMMUNITY
Start: 2023-06-07 | End: 2023-10-05

## 2023-10-06 DIAGNOSIS — F41.9 ANXIETY DISORDER, UNSPECIFIED: ICD-10-CM

## 2023-10-11 ENCOUNTER — APPOINTMENT (OUTPATIENT)
Dept: PAIN MANAGEMENT | Facility: CLINIC | Age: 73
End: 2023-10-11
Payer: OTHER MISCELLANEOUS

## 2023-10-11 VITALS
DIASTOLIC BLOOD PRESSURE: 79 MMHG | SYSTOLIC BLOOD PRESSURE: 127 MMHG | HEART RATE: 80 BPM | BODY MASS INDEX: 27.31 KG/M2 | WEIGHT: 160 LBS | HEIGHT: 64 IN

## 2023-10-11 PROCEDURE — 99214 OFFICE O/P EST MOD 30 MIN: CPT | Mod: ACP

## 2023-11-02 ENCOUNTER — OUTPATIENT (OUTPATIENT)
Dept: OUTPATIENT SERVICES | Facility: HOSPITAL | Age: 73
LOS: 1 days | End: 2023-11-02
Payer: MEDICARE

## 2023-11-02 ENCOUNTER — APPOINTMENT (OUTPATIENT)
Dept: PSYCHIATRY | Facility: CLINIC | Age: 73
End: 2023-11-02
Payer: MEDICARE

## 2023-11-02 DIAGNOSIS — F41.9 ANXIETY DISORDER, UNSPECIFIED: ICD-10-CM

## 2023-11-02 PROCEDURE — 99215 OFFICE O/P EST HI 40 MIN: CPT | Mod: 95

## 2023-11-02 PROCEDURE — ZZZZZ: CPT

## 2023-11-03 DIAGNOSIS — F41.9 ANXIETY DISORDER, UNSPECIFIED: ICD-10-CM

## 2023-11-08 ENCOUNTER — APPOINTMENT (OUTPATIENT)
Dept: PAIN MANAGEMENT | Facility: CLINIC | Age: 73
End: 2023-11-08
Payer: OTHER MISCELLANEOUS

## 2023-11-08 PROCEDURE — 99442: CPT

## 2023-11-14 ENCOUNTER — APPOINTMENT (OUTPATIENT)
Dept: HOME HEALTH SERVICES | Facility: HOME HEALTH | Age: 73
End: 2023-11-14

## 2023-11-14 ENCOUNTER — APPOINTMENT (OUTPATIENT)
Dept: HOME HEALTH SERVICES | Facility: HOME HEALTH | Age: 73
End: 2023-11-14
Payer: MEDICARE

## 2023-11-14 VITALS
BODY MASS INDEX: 28.68 KG/M2 | RESPIRATION RATE: 16 BRPM | DIASTOLIC BLOOD PRESSURE: 80 MMHG | OXYGEN SATURATION: 98 % | HEART RATE: 78 BPM | SYSTOLIC BLOOD PRESSURE: 120 MMHG | TEMPERATURE: 97 F | HEIGHT: 64 IN | WEIGHT: 168 LBS

## 2023-11-14 DIAGNOSIS — Z23 ENCOUNTER FOR IMMUNIZATION: ICD-10-CM

## 2023-11-14 DIAGNOSIS — Z79.891 LONG TERM (CURRENT) USE OF OPIATE ANALGESIC: ICD-10-CM

## 2023-11-14 PROCEDURE — 99344 HOME/RES VST NEW MOD MDM 60: CPT | Mod: 25

## 2023-11-14 PROCEDURE — 90662 IIV NO PRSV INCREASED AG IM: CPT

## 2023-11-14 PROCEDURE — 99497 ADVNCD CARE PLAN 30 MIN: CPT

## 2023-11-14 PROCEDURE — G0008: CPT

## 2023-12-13 ENCOUNTER — APPOINTMENT (OUTPATIENT)
Dept: PAIN MANAGEMENT | Facility: CLINIC | Age: 73
End: 2023-12-13
Payer: OTHER MISCELLANEOUS

## 2023-12-13 VITALS — BODY MASS INDEX: 28.68 KG/M2 | WEIGHT: 168 LBS | HEIGHT: 64 IN

## 2023-12-13 DIAGNOSIS — M25.551 PAIN IN RIGHT HIP: ICD-10-CM

## 2023-12-13 DIAGNOSIS — M47.817 SPONDYLOSIS W/OUT MYELOPATHY OR RADICULOPATHY, LUMBOSACRAL REGION: ICD-10-CM

## 2023-12-13 DIAGNOSIS — M25.552 PAIN IN LEFT HIP: ICD-10-CM

## 2023-12-13 PROCEDURE — 99214 OFFICE O/P EST MOD 30 MIN: CPT | Mod: ACP
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
## 2023-12-13 NOTE — PHYSICAL EXAM
[de-identified] : GENERAL APPEARANCE OF PATIENT IS WELL DEVELOPED, WELL NOURISHED, BODY HABITUS NORMAL, WELL GROOMED, NO DEFORMITIES NOTED. \par  Head - Atraumatic and Normocephalic \par  Eyes, Nose, and Throat: External inspection of ears and nose are normal overall without scars, lesions, or masses noted. Assessment of hearing is normal\par  Neck-Examination of neck shows no masses, overall appearance is normal, neck is symmetric, tracheal position is midline, no crepitus is noted. Examination of thyroid shows no enlargement, tenderness or masses\par  Respiratory- Assessment of respiratory effort shows no intercostal retractions, no use of accessory muscles, unlabored breathing, and normal diaphragmatic movement.\par  Cardiovascular- Examination of extremities show no edema or varicosities\par  Musculoskeletal. Examination of gait is not antalgic and station is normal\par  Inspection and palpation of digits and nails shows no clubbing, cyanosis, nodules, drainage, fluctuance, petechiae\par  \par  - Spine - 50-60 degrees in flexion. 60 in extension. Greater trochanter tenderness on the right. Bilateral facet tenderness at L3-S1 with pain. \par  \par  \par  - Neck- inspection and palpation shows no misalignment, asymmetry, crepitation, defects, tenderness, masses, effusions. ROM is normal without crepitation or contracture. No instability or subluxation or laxity is noted. No abnormal movements.\par  \par  \par  - RUE- Bicep tendon pain on the right.\par  \par  \par  - LUE- 190-180 degrees of active and passive abduction bilaterally. \par  \par  \par  - RLE- inspection and palpation shows no misalignment, asymmetry, crepitation, defects, tenderness, masses, effusions. ROM is normal without crepitation or contracture. No instability or subluxation or laxity is noted. No abnormal movements.\par  \par  \par  - LLE- inspection and palpation shows no misalignment, asymmetry, crepitation, defects, tenderness, masses, effusions. ROM is normal without crepitation or contracture. No instability or subluxation or laxity is noted. No abnormal movements.\par  \par  \par  - Skin- Inspection of skin and subcutaneous tissue shows no rashes, lesions or ulcers. Palpation of skin and subcutaneous tissue shows no rashes, no indurations, subcutaneous nodules or tightening.\par  \par  \par  - Abdomen- Nontender\par  \par  \par  - Neurologic- CN 2-12 are grossly intact. No sensory or motor deficits in the upper and lower extremities. Adequate strength in upper and lower extremities \par  \par  \par  - Psychiatric- Patient's judgment and insight are intact. Oriented to time, place and person. Recent and remote memory intact.\par  \par

## 2023-12-13 NOTE — ASSESSMENT
[FreeTextEntry1] : Ms. Che is a 73 year-old female with chronic pain in her lower back and neck. She is medically managed at our office. She is utilizing MS Contin 30 mg BID. We have been trying to wean her off of her medications as she is utilizing high dosages. This month we discussed titrating down her regimen of MS Contin. We will start by titrating down to MS Contin 30 mg once daily dosing. She will continue with Hydrocodone 10 mg TID for breakthrough pain along with Gabapentin 600 mg QID. She is utilizing Methocarbamol 750 mg PRN for spasms. Patient will f/u in 4 weeks for further evaluation.  Disability and Work Status: Totally disabled, permanent.  I have consulted the  registry for the purpose of reviewing the patient's-controlled substance.  ELICEO Maloney MD

## 2024-01-10 ENCOUNTER — APPOINTMENT (OUTPATIENT)
Dept: PAIN MANAGEMENT | Facility: CLINIC | Age: 74
End: 2024-01-10
Payer: OTHER MISCELLANEOUS

## 2024-01-10 ENCOUNTER — LABORATORY RESULT (OUTPATIENT)
Age: 74
End: 2024-01-10

## 2024-01-10 VITALS — WEIGHT: 168 LBS | HEIGHT: 64 IN | BODY MASS INDEX: 28.68 KG/M2

## 2024-01-10 DIAGNOSIS — M47.816 SPONDYLOSIS W/OUT MYELOPATHY OR RADICULOPATHY, LUMBAR REGION: ICD-10-CM

## 2024-01-10 DIAGNOSIS — M47.812 SPONDYLOSIS W/OUT MYELOPATHY OR RADICULOPATHY, CERVICAL REGION: ICD-10-CM

## 2024-01-10 DIAGNOSIS — M54.16 RADICULOPATHY, LUMBAR REGION: ICD-10-CM

## 2024-01-10 LAB
AMP / AMPHETAMINE: NEGATIVE
BAR / SECOBARBITAL: NEGATIVE
BUP / BUPRENORPHINE: NEGATIVE
BZO / OXAZEPAM: POSITIVE
COC / COCAINE: NEGATIVE
CREATININE: 20 MG/DL
MDMA / METHYLENEDIOXYMETHAMPHETAMINE: NEGATIVE
MET / METHAMPHETAMINES: NEGATIVE
MOP / MORPHINE: POSITIVE
MTD / METHADONE: NEGATIVE
OXY / OXYCODONE: NEGATIVE
PCP / PHENCYCLIDINE: NEGATIVE
PH: 4
SPECIFIC GRAVITY: 1.01
TEMPERATURE: 98 C
THC / MARIJUANA: NEGATIVE

## 2024-01-10 PROCEDURE — 80305 DRUG TEST PRSMV DIR OPT OBS: CPT | Mod: QW

## 2024-01-10 PROCEDURE — 99214 OFFICE O/P EST MOD 30 MIN: CPT

## 2024-01-12 ENCOUNTER — APPOINTMENT (OUTPATIENT)
Dept: HOME HEALTH SERVICES | Facility: HOME HEALTH | Age: 74
End: 2024-01-12

## 2024-01-12 VITALS
HEART RATE: 76 BPM | RESPIRATION RATE: 18 BRPM | TEMPERATURE: 98.3 F | SYSTOLIC BLOOD PRESSURE: 132 MMHG | DIASTOLIC BLOOD PRESSURE: 76 MMHG | OXYGEN SATURATION: 97 %

## 2024-01-12 RX ORDER — METHOCARBAMOL 750 MG/1
750 TABLET, FILM COATED ORAL
Qty: 90 | Refills: 0 | Status: ACTIVE | COMMUNITY
Start: 2020-12-17

## 2024-01-12 RX ORDER — CLONIDINE 0.1 MG/24H
0.1 PATCH, EXTENDED RELEASE TRANSDERMAL
Qty: 4 | Refills: 0 | Status: ACTIVE | COMMUNITY
Start: 2023-10-11

## 2024-01-12 RX ORDER — NAPROXEN 500 MG/1
500 TABLET ORAL
Qty: 60 | Refills: 1 | Status: ACTIVE | COMMUNITY
Start: 2020-09-22

## 2024-01-12 RX ORDER — PSYLLIUM HUSK 0.4 G
CAPSULE ORAL
Refills: 0 | Status: ACTIVE | COMMUNITY

## 2024-01-12 RX ORDER — GABAPENTIN 600 MG/1
600 TABLET, COATED ORAL 4 TIMES DAILY
Qty: 120 | Refills: 0 | Status: ACTIVE | COMMUNITY
Start: 2022-08-09

## 2024-01-12 RX ORDER — DIAZEPAM 10 MG/1
10 TABLET ORAL DAILY
Qty: 35 | Refills: 0 | Status: ACTIVE | COMMUNITY

## 2024-01-12 RX ORDER — VARENICLINE TARTRATE 25 MG
0.5 MG X 11 & KIT ORAL
Qty: 1 | Refills: 0 | Status: COMPLETED | COMMUNITY
Start: 2023-03-13 | End: 2024-01-12

## 2024-01-12 RX ORDER — UBIDECARENONE/VIT E ACET 100MG-5
50 MCG CAPSULE ORAL
Refills: 0 | Status: ACTIVE | COMMUNITY

## 2024-01-16 RX ORDER — TRAZODONE HYDROCHLORIDE 150 MG/1
150 TABLET ORAL
Qty: 90 | Refills: 0 | Status: ACTIVE | COMMUNITY
Start: 2021-02-16 | End: 1900-01-01

## 2024-01-23 DIAGNOSIS — J01.00 ACUTE MAXILLARY SINUSITIS, UNSPECIFIED: ICD-10-CM

## 2024-01-23 LAB
PM 6 MAM: NEGATIVE NG/ML
PM 7-AMINO-CLONAZ: 100 NG/ML
PM ALPHA-HYDROXY-ALPRAZOLAM: NEGATIVE NG/ML
PM ALPHA-HYDROXY-MIDAZOLAM: NEGATIVE NG/ML
PM ALPRAZOLAM: NEGATIVE NG/ML
PM AMOBARBITAL: NEGATIVE NG/ML
PM AMPHETAMINE INTERP: NEGATIVE
PM AMPHETAMINE: NEGATIVE NG/ML
PM BARBURATES INTERP: NEGATIVE
PM BEG: NEGATIVE NG/ML
PM BENZODIAZEPINES INTERP: POSITIVE
PM BUPRENORPHINE INTERP: NEGATIVE
PM BUPRENORPHINE: NEGATIVE NG/ML
PM BUTALBITAL: NEGATIVE NG/ML
PM CLONAZEPAM: NEGATIVE NG/ML
PM COCAINE INTERP: NEGATIVE
PM COCAINE: NEGATIVE NG/ML
PM CODIENE: NEGATIVE NG/ML
PM COTININE: 366 NG/ML
PM DIAZEPAM: NEGATIVE NG/ML
PM DIHYROCODEINE: 63 NG/ML
PM EDDP: NEGATIVE NG/ML
PM FENTANYL INTERP: NEGATIVE
PM FENTANYL: NEGATIVE NG/ML
PM FLUNITRAZEPAM: NEGATIVE NG/ML
PM FLURAZEPAM: NEGATIVE NG/ML
PM HYDROCODONE: 699 NG/ML
PM HYDROMORPHONE: 42 NG/ML
PM LORAZEPAM: NEGATIVE NG/ML
PM MARIJUANA (DELTA-9-THC): NEGATIVE NG/ML
PM MARIJUANA INTERP: NEGATIVE
PM MDA: NEGATIVE NG/ML
PM MDEA: NEGATIVE NG/ML
PM MDMA: NEGATIVE NG/ML
PM MEPERIDINE: NEGATIVE NG/ML
PM METHADONE INTERP: NEGATIVE
PM METHADONE: NEGATIVE NG/ML
PM METHAMPHETAMINE: NEGATIVE NG/ML
PM MIDAZOLAM: NEGATIVE NG/ML
PM MORPHINE: >1000 NG/ML
PM NALOXONE: NEGATIVE NG/ML
PM NALTREXONE: NEGATIVE NG/ML
PM NICOTINE INTERP: POSITIVE
PM NORBUPRENORPHINE: NEGATIVE NG/ML
PM NORDIAZEPAM: 84 NG/ML
PM NORFENTANYL: NEGATIVE NG/ML
PM NORMEPERIDINE: NEGATIVE NG/ML
PM NOROXYCODONE: NEGATIVE NG/ML
PM OPIOID INTERP: POSITIVE
PM OXAZEPAM: 182 NG/ML
PM OXXYCODONE INTERP: NEGATIVE
PM OXYCODONE: NEGATIVE NG/ML
PM OXYMORPHONE: NEGATIVE NG/ML
PM PCP: NEGATIVE NG/ML
PM PHENCYCLIDINE INTERP: NEGATIVE
PM PHENOBARBITAL: NEGATIVE NG/ML
PM PPX: NEGATIVE NG/ML
PM PROPOXYPHENE INTERP: NEGATIVE
PM SECOBARBITAL: NEGATIVE NG/ML
PM SUFENTANIL: NEGATIVE NG/ML
PM TAPENTADOL: NEGATIVE NG/ML
PM TEMAZEPAM: 77 NG/ML
PM TRAMADOL INTERP: NEGATIVE
PM TRAMADOL: NEGATIVE NG/ML

## 2024-01-23 RX ORDER — AMOXICILLIN AND CLAVULANATE POTASSIUM 875; 125 MG/1; MG/1
875-125 TABLET, COATED ORAL
Qty: 14 | Refills: 0 | Status: COMPLETED | COMMUNITY
Start: 2024-01-23 | End: 2024-01-30

## 2024-01-31 ENCOUNTER — NON-APPOINTMENT (OUTPATIENT)
Age: 74
End: 2024-01-31
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
## 2024-02-01 ENCOUNTER — TRANSCRIPTION ENCOUNTER (OUTPATIENT)
Age: 74
End: 2024-02-01
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
## 2024-02-01 NOTE — DISCUSSION/SUMMARY
[de-identified] : A discussion regarding available pain management treatment options occurred with the patient. These included interventional, rehabilitative, pharmacological, and alternative modalities. We will proceed with the following:    Imagin. MRI lumbar spine w/o contrast to evaluate for anatomic changes of the lumbar discs, nerves, and surrounding tissue that will help provide information to accurately diagnose the patient's cause of pain and therefore treat said pain generator in the most effective way possible - whether that be specific physical therapy recommendations, medications, and/or interventional therapies.  2. Utox today  Medications: 1. Ordered MS Contin 30 mg daily 2. Decreased Hydrocodone 10 mg from TID to BID  Next month, MS contin will go to 15mg qD and hydrocodone daily PRN  She reports about 30-50% reduction in pain with the use of these medications. We are in the process of weening down off these medication.  MRI lumbar spine w/o contrast ordered.  The patient has not had any recent updated imging for over many years and her symptoms are unrelenting requiring high dose narcotics. I am a new physician taking over her care and based on history and physical exam, MRI L spine is warranted to assess for objective reasoning why pain has worsened despite longstanding medication, injection, and therapeutic treatment.   ISTOP CHECKED Reference # [ [id number] ]   Patient understands that I will not continue the medication regimen as currently prescribed. I had a long discussion with the patient regarding the chronic use of opioids. The goal is a slow titration off controlled Hydrocodone 10 mg TID along with MS Contin 30 mg daily. She is free to find another pain physician who may be agreeable to continue this regimen which she wishes to do. I am happy to help facilitate in any way I can. She wants to maintain on this medication and so I stressed the importance of calling and having her care established by another pain physician. A list of pain doctors was provided to her. Opioid disengagement will commence on 3/12/2024.  UDS: Done today.   The R/B/A of chronic opiate therapy for non-malignant pain including, but not limited to, the risk of addiction, overdose, respiratory depression, and death was discussed and accepted by the patient. Patient was also informed that they should not consume alcohol or drive a motor vehicle under any circumstances while taking opiate pain medications.  The patient reports good pain control with their current medication regimen. They deny any intolerable side effects. There is no obvious aberrant behavior. The patient is more functional with regard to his ADLs and social activity as result of their current medication regimen.   - Follow up in 1 month. If she does not find another physician to take over this regimen by next month, we will continue this tapering, and this will be the last month I will fill her medication.   I Kia Segovia attest that this documentation has been prepared under the direction and in the presence of provider Dr. Tony Gallagher.  The documentation recorded by the scribe in my presence, accurately reflects the service I personally performed, and the decisions made by me with my edits as appropriate.   Tony Gallagher, DO

## 2024-02-01 NOTE — DATA REVIEWED
[FreeTextEntry1] : Lumbar MRI 2015 IMPRESSION: Mild to moderate degree of multilevel spondylosis. Minimal Grade I anterior degenerative listhesis of L4 relative to L5 and retrolisthesis of L1 relative to L2. Mildly bulging discs at multiple levels. Shallow right foraminal L4-5 disc herniation demonstrated previously has resorbed. The findings are otherwise stable since the prior study.

## 2024-02-01 NOTE — PHYSICAL EXAM
[de-identified] : Cervical Spine Exam:   Inspection:  erythema (-)   ecchymosis (-)  rashes (-)       Palpation:   Cervical paraspinal mm tenderness: R (+); L(+)  Upper trapezius mm tenderness: R (+); L (+)   Rhomboids tenderness: R (-); L (-)  Scalenes mm tenderness: R (-); L (-)  Occipital Ridge: R (-); L (-)  Supraspinatus mm tenderness: R (-); L (-)   ROM:  limited rom 2/2 to pain   Strength Testing:  Right Left  Deltoid (5/5) (5/5)  Biceps: (5/5) (5/5)   Triceps: (5/5) (5/5)   Finger Abductors: (5/5) (5/5)   Grasp: (5/5) (5/5)       Special Testing:  Spurling Test: R (-); L (-)  Facet load test: R (+); L (+)     Lumbar Spine Exam: Inspection: erythema (-) ecchymosis (-) rashes (-) alignment: no scoliosis  Palpation: Midline lumbar tenderness:           L (-)    (-) paraspinal tenderness:                  L (+) ; R (+) sciatic nerve tenderness :             L (-) ; R (+) SI joint tenderness:                        L (-) ; R (-) GTB tenderness:                            L (-);  R (-)  Limited ROM 2/2 to pain with lumbar flexion and extension w/ rotation to R and L side  Strength: 5/5 throughout all muscle groups of the lower extremity                                     Right       Left    Hip Flexion:                (5/5)       (5/5) Quadriceps:               (5/5)       (5/5) Hamstrings:                (5/5)       (5/5) Ankle Dorsiflexion:     (5/5)       (5/5) EHL:                           (5/5)       (5/5) Ankle Plantarflexion:  (5/5)       (5/5)  Special Tests: SLR:                           R (-) ; L (-) Facet loading:            R (+) ; L (+) ELISE test:               R (-) ; L (-)  Neurologic: Light touch intact throughout LE  Reflexes normal and symmetric   Gait: mildly antalgic gait ambulates w/o assistive device

## 2024-02-08 ENCOUNTER — APPOINTMENT (OUTPATIENT)
Dept: PAIN MANAGEMENT | Facility: CLINIC | Age: 74
End: 2024-02-08

## 2024-02-14 ENCOUNTER — APPOINTMENT (OUTPATIENT)
Dept: PAIN MANAGEMENT | Facility: CLINIC | Age: 74
End: 2024-02-14

## 2024-02-15 ENCOUNTER — NON-APPOINTMENT (OUTPATIENT)
Age: 74
End: 2024-02-15

## 2024-02-16 ENCOUNTER — APPOINTMENT (OUTPATIENT)
Dept: PAIN MANAGEMENT | Facility: CLINIC | Age: 74
End: 2024-02-16
Payer: OTHER MISCELLANEOUS

## 2024-02-16 PROCEDURE — 99441: CPT | Mod: 93

## 2024-02-16 PROCEDURE — 99212 OFFICE O/P EST SF 10 MIN: CPT | Mod: 95

## 2024-02-16 RX ORDER — MORPHINE SULFATE 15 MG/1
15 TABLET, FILM COATED, EXTENDED RELEASE ORAL DAILY
Qty: 30 | Refills: 0 | Status: ACTIVE | COMMUNITY
Start: 2022-10-03 | End: 1900-01-01

## 2024-02-16 RX ORDER — HYDROCODONE BITARTRATE AND ACETAMINOPHEN 10; 325 MG/1; MG/1
10-325 TABLET ORAL
Qty: 30 | Refills: 0 | Status: ACTIVE | COMMUNITY
Start: 2022-10-03 | End: 1900-01-01

## 2024-02-28 ENCOUNTER — APPOINTMENT (OUTPATIENT)
Dept: HOME HEALTH SERVICES | Facility: HOME HEALTH | Age: 74
End: 2024-02-28
Payer: MEDICARE

## 2024-02-28 DIAGNOSIS — E78.00 PURE HYPERCHOLESTEROLEMIA, UNSPECIFIED: ICD-10-CM

## 2024-02-28 DIAGNOSIS — F41.9 ANXIETY DISORDER, UNSPECIFIED: ICD-10-CM

## 2024-02-28 DIAGNOSIS — Z60.2 PROBLEMS RELATED TO LIVING ALONE: ICD-10-CM

## 2024-02-28 DIAGNOSIS — E04.1 NONTOXIC SINGLE THYROID NODULE: ICD-10-CM

## 2024-02-28 DIAGNOSIS — F43.10 POST-TRAUMATIC STRESS DISORDER, UNSPECIFIED: ICD-10-CM

## 2024-02-28 DIAGNOSIS — I10 ESSENTIAL (PRIMARY) HYPERTENSION: ICD-10-CM

## 2024-02-28 DIAGNOSIS — R00.1 BRADYCARDIA, UNSPECIFIED: ICD-10-CM

## 2024-02-28 DIAGNOSIS — K27.9 PEPTIC ULCER, SITE UNSPECIFIED, UNSPECIFIED AS ACUTE OR CHRONIC, W/OUT HEMORRHAGE OR PERFORATION: ICD-10-CM

## 2024-02-28 PROCEDURE — 99349 HOME/RES VST EST MOD MDM 40: CPT

## 2024-02-28 SDOH — SOCIAL STABILITY - SOCIAL INSECURITY: PROBLEMS RELATED TO LIVING ALONE: Z60.2

## 2024-02-29 VITALS
SYSTOLIC BLOOD PRESSURE: 134 MMHG | RESPIRATION RATE: 18 BRPM | TEMPERATURE: 97.5 F | DIASTOLIC BLOOD PRESSURE: 66 MMHG | OXYGEN SATURATION: 99 % | HEART RATE: 56 BPM

## 2024-02-29 PROBLEM — K27.9 PUD (PEPTIC ULCER DISEASE): Status: ACTIVE | Noted: 2017-02-28

## 2024-02-29 PROBLEM — E04.1 THYROID NODULE: Status: ACTIVE | Noted: 2017-02-28

## 2024-02-29 PROBLEM — I10 HTN (HYPERTENSION): Status: ACTIVE | Noted: 2017-02-28

## 2024-02-29 PROBLEM — R00.1 BRADYCARDIA: Status: ACTIVE | Noted: 2024-02-29

## 2024-02-29 PROBLEM — F41.9 ANXIETY: Status: ACTIVE | Noted: 2020-12-18

## 2024-02-29 PROBLEM — Z60.2 LIVES ALONE: Status: ACTIVE | Noted: 2024-02-29

## 2024-02-29 PROBLEM — E78.00 HYPERCHOLESTEREMIA: Status: ACTIVE | Noted: 2017-02-28

## 2024-02-29 PROBLEM — F43.10 PTSD (POST-TRAUMATIC STRESS DISORDER): Status: ACTIVE | Noted: 2022-10-06

## 2024-02-29 NOTE — CHRONIC CARE ASSESSMENT
[de-identified] : as tolerated [de-identified] : low sodium and low fat [PPS Score: ____] : Palliative Performance Scale (PPS) Score: [unfilled]

## 2024-02-29 NOTE — PHYSICAL EXAM
[No Acute Distress] : no acute distress [Normal Sclera/Conjunctiva] : normal sclera/conjunctiva [No JVD] : no jugular venous distention [No Accessory Muscle Use] : no accessory muscle use [No Respiratory Distress] : no respiratory distress [Regular Rhythm] : with a regular rhythm [No Edema] : there was no peripheral edema [Non Tender] : non-tender [Normal Anterior Cervical Nodes] : no anterior cervical lymphadenopathy [Soft] : abdomen soft [No CVA Tenderness] : no ~M costovertebral angle tenderness [No Spinal Tenderness] : no spinal tenderness [No Rash] : no rash [No Joint Swelling] : no joint swelling seen [No Skin Lesions] : no skin lesions [No Motor Deficits] : the motor exam was normal [Oriented x3] : oriented to person, place, and time [de-identified] : Patient sitting upright in chair [de-identified] : impacted right ear

## 2024-02-29 NOTE — HISTORY OF PRESENT ILLNESS
[House Calls Co-Management Patient] : [unfilled] is a House Calls co-management patient [Patient is stable] : patient is stable [Education provided] : education provided [Medications adjusted] : medication adjusted [Patient] : patient [LastPVisitDate] : 2023 [FreeTextEntry2] : 02/28/2024 COVID SCREEN: Patient or caregiver denies fever, cough, trouble breathing, rash or contact with someone who tested positive for COVID-19.   N95 mask, gloves, eye wear and gown (if indicated) used during visit: YES  Total face to face time with patient is 45 min.    Patricia Che, 73 year old female with HTN. HLD. Anxiety. Basal cell carcinoma of skin. PTSD. Breast nodule. Lumbar facet arthropathy presents for follow up visit.   Today patient reports right ear fullness and neck pain ongoing for greater than 2 weeks. Was started on Cirpo and Augmentin for UR and recommended to follow up with ENT by urgent care but never did. Reports cough and nasal congestion now improved. Right ear impacted wit cecum. Small amount removed, during removal cecum noted to be hardened and patient reports pain with removal. Will refer to ENT.    Neck pain patient reports is chronic but now worsened. Denies trauma or injury. Will obtain x-ray.   Patient reports no weight loss >10 lbs in the past 6 months. No changes in dentition or swallowing reported, No changes in hearing or vision reported. No changes in Cognition reported. Patient denies any symptoms of depression or anxiety. Patient is ADL independent and IADL dependent. No changes in gait or falls reported.   Patient's home environment is safe.

## 2024-02-29 NOTE — COUNSELING
[Overweight - ( BMI 25.1 - 29.9 )] : overweight -  ( BMI 25.1 - 29.9 ) [DASH diet recommended] : DASH diet recommended [Non - Smoker] : non-smoker [Smoke/CO Detectors] : smoke/CO detectors [Use grab bars] : use grab bars [Use assistive device to avoid falls] : use assistive device to avoid falls [Medical alert] : medical alert [Remove clutter and unsafe carpeting to avoid falls] : remove clutter and unsafe carpeting to avoid falls [] : eye exam [Improve exercise tolerance] : improve exercise tolerance [Improve mobility] : improve mobility [Improve weight] : improve weight [Improve pain control] : improve pain control [Decrease stress] : decrease stress [Decrease hospital use] : decrease hospital use [Minimize unnecessary interventions] : minimize unnecessary interventions [Comfort Care] : comfort care [Likely to achieve goals/desired outcomes] : likely to achieve goals/desired outcomes [Discussed disease trajectory with patient/caregiver] : discussed disease trajectory with patient/caregiver [Maintain functional ability] : maintain functional ability [Advanced Directives discussed: ____] : Advanced directives discussed: [unfilled] [Full Code] : Code Status: Full Code [[facility] Ver[facility] Date: _____] [facility]/last verification date: [unfilled]

## 2024-02-29 NOTE — LETTER HEADER
[Care Plan reviewed and provided to patient/caregiver] : Care plan reviewed and provided to patient/caregiver [Care Plan reviewed every ___ weeks] : Care plan reviewed every [unfilled] weeks [Initiation or substantial revisions made to care plan involving mod/high medical decision making for complex CCM] : Initiation or substantial revisions made to care plan involving mod/high medical decision making for complex CCM

## 2024-02-29 NOTE — REASON FOR VISIT
[Follow-Up] : a follow-up visit [Pre-Visit Preparation] : pre-visit preparation was done [FreeTextEntry1] : PMH: PTSD, anxiety, HTN, HLD, chronic pain, thyroid nodule, PUD and spine injury

## 2024-02-29 NOTE — HEALTH RISK ASSESSMENT
[HRA Reviewed] : Health risk assessment reviewed [Independent] : managing medications [Some assistance needed] : housekeeping [Full assistance needed] : using transportation [No falls in past year] : Patient reported no falls in the past year [TimeGetUpGo] : 6

## 2024-02-29 NOTE — REVIEW OF SYSTEMS
[Hearing Loss] : hearing loss [Earache] : earache [Muscle Weakness] : muscle weakness [Negative] : Heme/Lymph

## 2024-03-07 ENCOUNTER — APPOINTMENT (OUTPATIENT)
Dept: PAIN MANAGEMENT | Facility: CLINIC | Age: 74
End: 2024-03-07

## 2024-03-08 ENCOUNTER — NON-APPOINTMENT (OUTPATIENT)
Age: 74
End: 2024-03-08

## 2024-03-13 ENCOUNTER — APPOINTMENT (OUTPATIENT)
Dept: OTOLARYNGOLOGY | Facility: CLINIC | Age: 74
End: 2024-03-13
Payer: MEDICARE

## 2024-03-13 ENCOUNTER — APPOINTMENT (OUTPATIENT)
Dept: PAIN MANAGEMENT | Facility: CLINIC | Age: 74
End: 2024-03-13

## 2024-03-13 VITALS — WEIGHT: 168 LBS | BODY MASS INDEX: 28.84 KG/M2

## 2024-03-13 DIAGNOSIS — R09.89 OTHER SPECIFIED SYMPTOMS AND SIGNS INVOLVING THE CIRCULATORY AND RESPIRATORY SYSTEMS: ICD-10-CM

## 2024-03-13 DIAGNOSIS — J34.89 OTHER SPECIFIED DISORDERS OF NOSE AND NASAL SINUSES: ICD-10-CM

## 2024-03-13 DIAGNOSIS — R42 DIZZINESS AND GIDDINESS: ICD-10-CM

## 2024-03-13 DIAGNOSIS — H91.93 UNSPECIFIED HEARING LOSS, BILATERAL: ICD-10-CM

## 2024-03-13 DIAGNOSIS — H61.21 IMPACTED CERUMEN, RIGHT EAR: ICD-10-CM

## 2024-03-13 PROCEDURE — 99204 OFFICE O/P NEW MOD 45 MIN: CPT | Mod: 25

## 2024-03-13 PROCEDURE — 92567 TYMPANOMETRY: CPT

## 2024-03-13 PROCEDURE — 92557 COMPREHENSIVE HEARING TEST: CPT

## 2024-03-13 PROCEDURE — G0268 REMOVAL OF IMPACTED WAX MD: CPT

## 2024-03-13 RX ORDER — FLUTICASONE PROPIONATE 50 UG/1
50 SPRAY, METERED NASAL
Qty: 1 | Refills: 2 | Status: ACTIVE | COMMUNITY
Start: 2024-03-13 | End: 1900-01-01

## 2024-03-13 NOTE — ASSESSMENT
[FreeTextEntry1] : Wax found and cleaned. Cleaning well tolerated by patient. Patient felt improvement in cloginess after cleaning.  I reviewed, interpreted, and discussed the Audiogram done today. Bilateral mild hearing loss.

## 2024-03-13 NOTE — REASON FOR VISIT
[Initial Evaluation] : an initial evaluation for [FreeTextEntry2] : right clogged ears , muffled echo in right ear ,  stiff neck

## 2024-03-13 NOTE — PHYSICAL EXAM
[Normal] : palatine tonsils are normal [Midline] : trachea located in midline position [de-identified] : cerumen impaction of the right ear

## 2024-03-13 NOTE — HISTORY OF PRESENT ILLNESS
[Ear Fullness] : ear fullness [Anxiety] : anxiety [Dizziness] : dizziness [de-identified] : Patient presents today for clogged right ear. Feels like she ears an echo on her right ear. Started to feel clogging of the right ear 2 months ago. Has tightness in her throat in the last 2 months. Has also felt very unbalanced and dizzy. Has no sensation that the room is spinning. Will feel like she is constantly dizzy.  Has also been having frontal sinus pressure and pain. Has occasional rhinorrhea. Only seen clear mucus.   [Hearing Loss] : no hearing loss [Tinnitus] : no tinnitus [Otalgia] : no otalgia [Otorrhea] : no otorrhea [Vertigo] : no vertigo [Recurrent Otitis Media] : no recurrent otitis media [Glomus Tumor] : no glomus tumor [Meningitis] : no meningitis [Otitis Media with Effusion] : no otitis media with effusion [Stroke] : no stroke [Acoustic Neuroma] : no acoustic neuroma [Facial Nerve Paralysis] : no facial nerve paralysis [Prior Ear Surgery] : no prior ear surgery [Meniere Disease] : no Meniere disease [Major Depression] : no major depression [Eustachian Tube Dysfunction] : no eustachian tube dysfunction [Diabetes] : no diabetes [Otosclerosis] : no otosclerosis [Cholesteatoma] : no cholesteatoma [Autoimmune Diseases] : no autoimmune diseases [Multiple Sclerosis] : no multiple sclerosis [Hypertension] : no hypertension [Cardiac Disease] : no cardiac disease [Smoking] : no smoking [Alcohol] : no consumption of alcohol

## 2024-04-01 ENCOUNTER — INPATIENT (INPATIENT)
Facility: HOSPITAL | Age: 74
LOS: 4 days | Discharge: HOME | DRG: 378 | End: 2024-04-06
Attending: STUDENT IN AN ORGANIZED HEALTH CARE EDUCATION/TRAINING PROGRAM | Admitting: HOSPITALIST
Payer: MEDICARE

## 2024-04-01 ENCOUNTER — TRANSCRIPTION ENCOUNTER (OUTPATIENT)
Age: 74
End: 2024-04-01

## 2024-04-01 ENCOUNTER — NON-APPOINTMENT (OUTPATIENT)
Age: 74
End: 2024-04-01

## 2024-04-01 VITALS
OXYGEN SATURATION: 98 % | HEART RATE: 98 BPM | SYSTOLIC BLOOD PRESSURE: 167 MMHG | TEMPERATURE: 97 F | RESPIRATION RATE: 19 BRPM | DIASTOLIC BLOOD PRESSURE: 74 MMHG
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
## 2024-04-01 DIAGNOSIS — R55 SYNCOPE AND COLLAPSE: ICD-10-CM

## 2024-04-01 LAB
ALBUMIN SERPL ELPH-MCNC: 4.4 G/DL — SIGNIFICANT CHANGE UP (ref 3.5–5.2)
ALP SERPL-CCNC: 56 U/L — SIGNIFICANT CHANGE UP (ref 30–115)
ALT FLD-CCNC: 14 U/L — SIGNIFICANT CHANGE UP (ref 0–41)
ANION GAP SERPL CALC-SCNC: 14 MMOL/L — SIGNIFICANT CHANGE UP (ref 7–14)
APTT BLD: 25.3 SEC — LOW (ref 27–39.2)
AST SERPL-CCNC: 19 U/L — SIGNIFICANT CHANGE UP (ref 0–41)
BASOPHILS # BLD AUTO: 0.05 K/UL — SIGNIFICANT CHANGE UP (ref 0–0.2)
BASOPHILS NFR BLD AUTO: 0.3 % — SIGNIFICANT CHANGE UP (ref 0–1)
BILIRUB SERPL-MCNC: 0.7 MG/DL — SIGNIFICANT CHANGE UP (ref 0.2–1.2)
BUN SERPL-MCNC: 18 MG/DL — SIGNIFICANT CHANGE UP (ref 10–20)
CALCIUM SERPL-MCNC: 9 MG/DL — SIGNIFICANT CHANGE UP (ref 8.4–10.5)
CHLORIDE SERPL-SCNC: 99 MMOL/L — SIGNIFICANT CHANGE UP (ref 98–110)
CO2 SERPL-SCNC: 21 MMOL/L — SIGNIFICANT CHANGE UP (ref 17–32)
CREAT SERPL-MCNC: 0.7 MG/DL — SIGNIFICANT CHANGE UP (ref 0.7–1.5)
EGFR: 91 ML/MIN/1.73M2 — SIGNIFICANT CHANGE UP
EOSINOPHIL # BLD AUTO: 0.04 K/UL — SIGNIFICANT CHANGE UP (ref 0–0.7)
EOSINOPHIL NFR BLD AUTO: 0.3 % — SIGNIFICANT CHANGE UP (ref 0–8)
GLUCOSE SERPL-MCNC: 103 MG/DL — HIGH (ref 70–99)
HCT VFR BLD CALC: 27.4 % — LOW (ref 37–47)
HGB BLD-MCNC: 9.8 G/DL — LOW (ref 12–16)
IMM GRANULOCYTES NFR BLD AUTO: 0.8 % — HIGH (ref 0.1–0.3)
INR BLD: 1.09 RATIO — SIGNIFICANT CHANGE UP (ref 0.65–1.3)
LYMPHOCYTES # BLD AUTO: 15.2 % — LOW (ref 20.5–51.1)
LYMPHOCYTES # BLD AUTO: 2.22 K/UL — SIGNIFICANT CHANGE UP (ref 1.2–3.4)
MAGNESIUM SERPL-MCNC: 1.9 MG/DL — SIGNIFICANT CHANGE UP (ref 1.8–2.4)
MCHC RBC-ENTMCNC: 31.3 PG — HIGH (ref 27–31)
MCHC RBC-ENTMCNC: 35.8 G/DL — SIGNIFICANT CHANGE UP (ref 32–37)
MCV RBC AUTO: 87.5 FL — SIGNIFICANT CHANGE UP (ref 81–99)
MONOCYTES # BLD AUTO: 0.85 K/UL — HIGH (ref 0.1–0.6)
MONOCYTES NFR BLD AUTO: 5.8 % — SIGNIFICANT CHANGE UP (ref 1.7–9.3)
NEUTROPHILS # BLD AUTO: 11.31 K/UL — HIGH (ref 1.4–6.5)
NEUTROPHILS NFR BLD AUTO: 77.6 % — HIGH (ref 42.2–75.2)
NRBC # BLD: 0 /100 WBCS — SIGNIFICANT CHANGE UP (ref 0–0)
PLATELET # BLD AUTO: 339 K/UL — SIGNIFICANT CHANGE UP (ref 130–400)
PMV BLD: 9.5 FL — SIGNIFICANT CHANGE UP (ref 7.4–10.4)
POTASSIUM SERPL-MCNC: 3.8 MMOL/L — SIGNIFICANT CHANGE UP (ref 3.5–5)
POTASSIUM SERPL-SCNC: 3.8 MMOL/L — SIGNIFICANT CHANGE UP (ref 3.5–5)
PROT SERPL-MCNC: 6.8 G/DL — SIGNIFICANT CHANGE UP (ref 6–8)
PROTHROM AB SERPL-ACNC: 12.4 SEC — SIGNIFICANT CHANGE UP (ref 9.95–12.87)
RBC # BLD: 3.13 M/UL — LOW (ref 4.2–5.4)
RBC # FLD: 12.9 % — SIGNIFICANT CHANGE UP (ref 11.5–14.5)
SODIUM SERPL-SCNC: 134 MMOL/L — LOW (ref 135–146)
TROPONIN T, HIGH SENSITIVITY RESULT: 10 NG/L — SIGNIFICANT CHANGE UP (ref 6–13)
WBC # BLD: 14.59 K/UL — HIGH (ref 4.8–10.8)
WBC # FLD AUTO: 14.59 K/UL — HIGH (ref 4.8–10.8)

## 2024-04-01 PROCEDURE — 73630 X-RAY EXAM OF FOOT: CPT | Mod: 26,RT

## 2024-04-01 PROCEDURE — 73600 X-RAY EXAM OF ANKLE: CPT | Mod: 26,RT

## 2024-04-01 PROCEDURE — 80053 COMPREHEN METABOLIC PANEL: CPT

## 2024-04-01 PROCEDURE — 86901 BLOOD TYPING SEROLOGIC RH(D): CPT

## 2024-04-01 PROCEDURE — C9113: CPT

## 2024-04-01 PROCEDURE — 85730 THROMBOPLASTIN TIME PARTIAL: CPT

## 2024-04-01 PROCEDURE — 72170 X-RAY EXAM OF PELVIS: CPT | Mod: 26

## 2024-04-01 PROCEDURE — 85027 COMPLETE CBC AUTOMATED: CPT

## 2024-04-01 PROCEDURE — 73552 X-RAY EXAM OF FEMUR 2/>: CPT | Mod: 26,RT

## 2024-04-01 PROCEDURE — 83735 ASSAY OF MAGNESIUM: CPT

## 2024-04-01 PROCEDURE — 88312 SPECIAL STAINS GROUP 1: CPT

## 2024-04-01 PROCEDURE — 27788 TREATMENT OF ANKLE FRACTURE: CPT | Mod: 54,RT

## 2024-04-01 PROCEDURE — 94640 AIRWAY INHALATION TREATMENT: CPT

## 2024-04-01 PROCEDURE — 86900 BLOOD TYPING SEROLOGIC ABO: CPT

## 2024-04-01 PROCEDURE — 71045 X-RAY EXAM CHEST 1 VIEW: CPT | Mod: 26

## 2024-04-01 PROCEDURE — 86803 HEPATITIS C AB TEST: CPT

## 2024-04-01 PROCEDURE — 72125 CT NECK SPINE W/O DYE: CPT | Mod: 26,MC

## 2024-04-01 PROCEDURE — 74177 CT ABD & PELVIS W/CONTRAST: CPT | Mod: 26,MC

## 2024-04-01 PROCEDURE — 99223 1ST HOSP IP/OBS HIGH 75: CPT

## 2024-04-01 PROCEDURE — 93010 ELECTROCARDIOGRAM REPORT: CPT

## 2024-04-01 PROCEDURE — 97110 THERAPEUTIC EXERCISES: CPT | Mod: GP

## 2024-04-01 PROCEDURE — 93306 TTE W/DOPPLER COMPLETE: CPT

## 2024-04-01 PROCEDURE — 82728 ASSAY OF FERRITIN: CPT

## 2024-04-01 PROCEDURE — 85025 COMPLETE CBC W/AUTO DIFF WBC: CPT

## 2024-04-01 PROCEDURE — 73562 X-RAY EXAM OF KNEE 3: CPT | Mod: 26,RT

## 2024-04-01 PROCEDURE — 93005 ELECTROCARDIOGRAM TRACING: CPT

## 2024-04-01 PROCEDURE — 97162 PT EVAL MOD COMPLEX 30 MIN: CPT | Mod: GP

## 2024-04-01 PROCEDURE — 71260 CT THORAX DX C+: CPT | Mod: 26,MC

## 2024-04-01 PROCEDURE — 88305 TISSUE EXAM BY PATHOLOGIST: CPT

## 2024-04-01 PROCEDURE — 36415 COLL VENOUS BLD VENIPUNCTURE: CPT

## 2024-04-01 PROCEDURE — 86850 RBC ANTIBODY SCREEN: CPT

## 2024-04-01 PROCEDURE — 85610 PROTHROMBIN TIME: CPT

## 2024-04-01 PROCEDURE — 99285 EMERGENCY DEPT VISIT HI MDM: CPT | Mod: 57

## 2024-04-01 PROCEDURE — 70450 CT HEAD/BRAIN W/O DYE: CPT | Mod: 26,MC

## 2024-04-01 PROCEDURE — 97116 GAIT TRAINING THERAPY: CPT | Mod: GP

## 2024-04-01 PROCEDURE — 80048 BASIC METABOLIC PNL TOTAL CA: CPT

## 2024-04-01 PROCEDURE — 83550 IRON BINDING TEST: CPT

## 2024-04-01 PROCEDURE — 83540 ASSAY OF IRON: CPT

## 2024-04-01 PROCEDURE — 73610 X-RAY EXAM OF ANKLE: CPT | Mod: LT

## 2024-04-01 PROCEDURE — 95819 EEG AWAKE AND ASLEEP: CPT

## 2024-04-01 PROCEDURE — 73590 X-RAY EXAM OF LOWER LEG: CPT | Mod: 26,RT

## 2024-04-01 RX ORDER — GUAIFENESIN/DEXTROMETHORPHAN 600MG-30MG
10 TABLET, EXTENDED RELEASE 12 HR ORAL EVERY 8 HOURS
Refills: 0 | Status: DISCONTINUED | OUTPATIENT
Start: 2024-04-01 | End: 2024-04-06

## 2024-04-01 RX ORDER — MORPHINE SULFATE 50 MG/1
10 CAPSULE, EXTENDED RELEASE ORAL EVERY 6 HOURS
Refills: 0 | Status: DISCONTINUED | OUTPATIENT
Start: 2024-04-01 | End: 2024-04-01

## 2024-04-01 RX ORDER — MORPHINE SULFATE 50 MG/1
15 CAPSULE, EXTENDED RELEASE ORAL EVERY 6 HOURS
Refills: 0 | Status: DISCONTINUED | OUTPATIENT
Start: 2024-04-01 | End: 2024-04-06

## 2024-04-01 RX ORDER — ENOXAPARIN SODIUM 100 MG/ML
40 INJECTION SUBCUTANEOUS EVERY 24 HOURS
Refills: 0 | Status: DISCONTINUED | OUTPATIENT
Start: 2024-04-01 | End: 2024-04-02

## 2024-04-01 RX ORDER — HYDRALAZINE HCL 50 MG
25 TABLET ORAL ONCE
Refills: 0 | Status: COMPLETED | OUTPATIENT
Start: 2024-04-01 | End: 2024-04-04

## 2024-04-01 RX ORDER — GABAPENTIN 400 MG/1
1 CAPSULE ORAL
Refills: 0 | DISCHARGE

## 2024-04-01 RX ORDER — MORPHINE SULFATE 50 MG/1
2 CAPSULE, EXTENDED RELEASE ORAL ONCE
Refills: 0 | Status: DISCONTINUED | OUTPATIENT
Start: 2024-04-01 | End: 2024-04-01

## 2024-04-01 RX ORDER — GABAPENTIN 400 MG/1
300 CAPSULE ORAL THREE TIMES A DAY
Refills: 0 | Status: DISCONTINUED | OUTPATIENT
Start: 2024-04-01 | End: 2024-04-03

## 2024-04-01 RX ORDER — MORPHINE SULFATE 50 MG/1
30 CAPSULE, EXTENDED RELEASE ORAL
Refills: 0 | DISCHARGE

## 2024-04-01 RX ORDER — FLUTICASONE PROPIONATE 50 MCG
1 SPRAY, SUSPENSION NASAL
Refills: 0 | Status: DISCONTINUED | OUTPATIENT
Start: 2024-04-01 | End: 2024-04-06

## 2024-04-01 RX ORDER — ACETAMINOPHEN 500 MG
650 TABLET ORAL EVERY 6 HOURS
Refills: 0 | Status: DISCONTINUED | OUTPATIENT
Start: 2024-04-01 | End: 2024-04-03

## 2024-04-01 RX ADMIN — MORPHINE SULFATE 15 MILLIGRAM(S): 50 CAPSULE, EXTENDED RELEASE ORAL at 21:33

## 2024-04-01 RX ADMIN — MORPHINE SULFATE 2 MILLIGRAM(S): 50 CAPSULE, EXTENDED RELEASE ORAL at 12:22

## 2024-04-01 RX ADMIN — MORPHINE SULFATE 2 MILLIGRAM(S): 50 CAPSULE, EXTENDED RELEASE ORAL at 17:06

## 2024-04-01 RX ADMIN — MORPHINE SULFATE 2 MILLIGRAM(S): 50 CAPSULE, EXTENDED RELEASE ORAL at 16:05

## 2024-04-01 NOTE — ED PROVIDER NOTE - CARE PLAN
Principal Discharge DX:	Syncope  Secondary Diagnosis:	Ankle fracture  Secondary Diagnosis:	Thoracic spine fracture   1

## 2024-04-01 NOTE — ED PROVIDER NOTE - WHICH SHOWED
EKG: Sinus rhythm with PAC, right axis, rate of 84, , , QTc 453.  Unchanged compared to prior on August 2009    Xray films: lateral malleolus fracture, XRs otherwise without acute fracture or dislocation, CXR without infiltrates no PTX, no CHF

## 2024-04-01 NOTE — ED PROVIDER NOTE - PROGRESS NOTE DETAILS
Patient is admitted for syncope and ambulatory dysfunction. Pt is aware of the plan and agrees. Pt has been endorsed to MAR. Spoke with neuro ex regarding to the CT finding.

## 2024-04-01 NOTE — ED PROCEDURE NOTE - NS_EDPROVIDERDISPOUSERTYPE_ED_A_ED
Attending Attestation (For Attendings USE Only)...
You can access the FollowMyHealth Patient Portal offered by [facility] by registering at the following website: [email or web address]. By joining [employer]’s FollowMyHealth portal, you will also be able to view your health information using other applications (apps) compatible with our system.
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
## 2024-04-01 NOTE — H&P ADULT - NSHPPHYSICALEXAM_GEN_ALL_CORE
PHYSICAL EXAM:  GENERAL: NAD, lying in bed comfortably  HEAD:  Atraumatic, Normocephalic  EYES: EOMI, PERRLA, conjunctiva and sclera clear  ENT: Moist mucous membranes  NECK: Supple, No JVD  CHEST/LUNG: Clear to auscultation bilaterally; No rales, rhonchi, wheezing, or rubs. Unlabored respirations  HEART: Regular rate and rhythm; No murmurs, rubs, or gallops  ABDOMEN: Bowel sounds present; Soft, Nontender, Nondistended. No hepatomegaly  EXTREMITIES:  2+ Peripheral Pulses, brisk capillary refill. No clubbing, cyanosis, or edema  NERVOUS SYSTEM:  Alert & Oriented X3, speech clear. No deficits   MSK: FROM all 4 extremities, full and equal strength, (+) thoracic spine tendermess, right ankle in splint  SKIN: No rashes or lesions

## 2024-04-01 NOTE — H&P ADULT - HISTORY OF PRESENT ILLNESS
73-year-old female with a past medical history of chronic back pain, PTSD, anxiety, and depression who presents to the ED for evaluation.  Reports that she syncopized twice yesterday while she was going to her kitchen to drink water.  Endorses pain in her right lower extremity and back.  Denies history of seizure, loss of bladder/bowel control, and pulm lining.  Denies fever, chest pain, shortness breath, nausea, vomiting, abdominal pain, hematuria, dysuria, melena, and hematochezia.    At the ED, patient was afebrile, hypertensive (SBP max 194), tachycardic (max HR 98), remains on RA. Labs significant for leukocytosis (14,6K), anemia (Hb- 9.8). Trauma imaging workup (+) for nondisplaced malleolar fracture at right ankle and compression deformity at T7. EKG shows NS with PACs    Right ankle was splinted. Neurosurgery saw patient- no acute surgical intervention and follow up outpatient in 4 weeks. Admit to telemetry for syncope workup.  73-year-old female with a past medical history of chronic back pain, PTSD, anxiety, and depression who presents to the ED for evaluation.  Reports that she syncopized twice yesterday while she was going to her kitchen to drink water.  Endorses pain in her right lower extremity and back.  Denies history of seizure, loss of bladder/bowel control, and pulm lining.  Denies fever, chest pain, shortness breath, nausea, vomiting, abdominal pain, hematuria, dysuria, melena, and hematochezia.    Patient has been sick several weeks ago with URI sx. Still endorses cough. Went to PCP and was prescribed amoxicillin and ciprofloxacin without symptom improvement. Had diarrhea few days ago treated with imodium yesterday.     At the ED, patient was afebrile, hypertensive (SBP max 194), tachycardic (max HR 98), remains on RA. Labs significant for leukocytosis (14,6K), anemia (Hb- 9.8). Trauma imaging workup (+) for nondisplaced malleolar fracture at right ankle and compression deformity at T7. EKG shows NS with PACs    Right ankle was splinted. Neurosurgery saw patient- no acute surgical intervention and follow up outpatient in 4 weeks. Admit to telemetry for syncope workup. HTN has improved with SBP 150s.

## 2024-04-01 NOTE — DISCHARGE NOTE NURSING/CASE MANAGEMENT/SOCIAL WORK - PATIENT PORTAL LINK FT
You can access the FollowMyHealth Patient Portal offered by [facility] by registering at the following website: [email or web address]. By joining [employer]’s FollowMyHealth portal, you will also be able to view your health information using other applications (apps) compatible with our system.

## 2024-04-01 NOTE — H&P ADULT - NSHPLABSRESULTS_GEN_ALL_CORE
LABS:  04-01 @ 12:05 Creatine 125 U/L [0 - 225]  cret                        9.8    14.59 )-----------( 339      ( 01 Apr 2024 12:05 )             27.4     04-01    134<L>  |  99  |  18  ----------------------------<  103<H>  3.8   |  21  |  0.7    Ca    9.0      01 Apr 2024 12:05  Mg     1.9     04-01    TPro  6.8  /  Alb  4.4  /  TBili  0.7  /  DBili  x   /  AST  19  /  ALT  14  /  AlkPhos  56  04-01    PT/INR - ( 01 Apr 2024 12:05 )   PT: 12.40 sec;   INR: 1.09 ratio         PTT - ( 01 Apr 2024 12:05 )  PTT:25.3 sec      IMPRESSION:    CT HEAD:  No acute intracranial findings.    CT CERVICAL SPINE:  No acute cervical fracture or facet subluxation.    < from: Xray Femur 2 Views, Right (04.01.24 @ 11:35) >    Multiple views of the right femur are submitted demonstrating no fracture   or soft tissue abnormality.        < from: Xray Knee 3 Views, Right (04.01.24 @ 11:35) >    Frontal, lateral, and oblique views of the right knee are submitted   demonstrating no acute fracture, dislocation, joint space or soft tissue   abnormality.      impression:    Bonydemineralization is recognized without acute displaced fracture.    Note that in such osteopenic patients, a nondisplaced fracture can be   under evaluated and if pain persists, an MRI of the region should be   considered.    < end of copied text >    < from: Xray Ankle 2 Views, Right (04.01.24 @ 11:36) >    IMPRESSION:    Nondisplaced lateral malleolus fracture.    < end of copied text >    < from: Xray Tibia + Fibula 2 Views, Right (04.01.24 @ 11:36) >    impression:    Tibia and fibula appear to be intact. Lateral malleoli irregular   laterally is better seen on ankle imaging.      Impression:    Hammertoe deformities are seen.    The plantar arch is intact.    No acute displaced fracture.    < from: CT Abdomen and Pelvis w/ IV Cont (04.01.24 @ 14:41) >    IMPRESSION:    Compression deformity of the T7 vertebral body of indeterminate age.    Prominent main pulmonary artery may seen with pulmonary arterial   hypertension.    < end of copied text >

## 2024-04-01 NOTE — ED ADULT NURSE NOTE - NSFALLHARMRISKINTERV_ED_ALL_ED

## 2024-04-01 NOTE — ED PROVIDER NOTE - OBJECTIVE STATEMENT
73-year-old female with a past medical history of chronic back pain, PTSD, anxiety, and depression who presents to the ED for evaluation.  Reports that she syncopized twice yesterday while she was going to her kitchen to drink water.  Endorses pain in her right lower extremity and back.  Denies history of seizure, loss of bladder/bowel control, and pulm lining.  Denies fever, chest pain, shortness breath, nausea, vomiting, abdominal pain, hematuria, dysuria, melena, and hematochezia.

## 2024-04-01 NOTE — DISCHARGE NOTE NURSING/CASE MANAGEMENT/SOCIAL WORK - NSDCPEFALRISK_GEN_ALL_CORE
For information on Fall & Injury Prevention, visit: [email or web address] OR  [email or web address] OR  https://www.cdc.gov/steadi/patient.html

## 2024-04-01 NOTE — ED PROVIDER NOTE - CLINICAL SUMMARY MEDICAL DECISION MAKING FREE TEXT BOX
70-year-old female history of chronic back pain, PTSD, anxiety, depression, presents to the ED complaining of total body pain after multiple syncopal episodes last night.  Patient states twice yesterday she was in her kitchen to drink water when she suddenly lost consciousness.  Positive head strike, positive LOC, no urinary incontinence, no tongue biting, was not postictal to the best of her knowledge.  She is unsure how long she was unconscious for.  Denies any anticoagulation use.  She lives alone, has difficulty caring for herself and has not been able to walk since the injury.  Denies fevers or chest pain or shortness of breath, no nausea or vomiting or diarrhea, no urinary symptoms, no bleeding events.    On exam, agree with exam as documented by PA above.  She is tender to the right thorax, right lateral ankle but is neurovascularly intact.    Imaging notable for a T7 compression fracture of undetermined age, right lateral malleolus fracture.  A splint was applied as per procedure note to the right foot.  Patient informed of all results.  Patient is an unsafe disposition home given she lives alone and is unable to ambulate, so will be admitted for possible rehab services, pain control.

## 2024-04-01 NOTE — ED PROVIDER NOTE - PHYSICAL EXAMINATION
CONSTITUTIONAL: in no apparent distress.   ENT: Hearing is intact with good acuity to spoken voice.  Patient is speaking clearly, not muffled and airway is intact.   RESPIRATORY: No signs of respiratory distress. Lung sounds are clear in all lobes bilaterally without rales, rhonchi, or wheezes.  CARDIOVASCULAR: Regular rate and rhythm.   GI: Abdomen is soft, non-tender, and without distention. Bowel sounds are present and normoactive in all four quadrants. No masses are noted.   BACK: No evidence of trauma or deformity. No midline tenderness. Normal ROM.   MS: RLE with no obvious deformity or swelling; tender to palpation in R lower leg and ankle area; limited ROM; sensory function intact; distal pulse present. Rest of the upper and lower extremities unremarkable.  NEURO: A & O x 3. Normal speech. No focal deficit.  PSYCHOLOGICAL: Appropriate mood and affect. Good judgement and insight.

## 2024-04-01 NOTE — H&P ADULT - NSICDXPASTMEDICALHX_GEN_ALL_CORE_FT
PAST MEDICAL HISTORY:  Chronic back pain     Depression     Post traumatic stress disorder (PTSD)
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
## 2024-04-01 NOTE — CONSULT NOTE ADULT - SUBJECTIVE AND OBJECTIVE BOX
HPI: 72 y/o female who has seen DR Persaud from pain management in the past for back pain comes in today after falling twice yesterday sustaining a right ankle fracture which is splinted and complaining of severe upper back pain radiating to her right posterior chest. CT scan revealed a T7 compression fracture. The patient has tenderness in the area and states its > 10/10.      PAST MEDICAL & SURGICAL HISTORY:    Home Medications:    Allergies    No Known Allergies    Intolerances    MEDICATIONS  (STANDING):    MEDICATIONS  (PRN):      ICU Vital Signs Last 24 Hrs  T(C): 36.6 (01 Apr 2024 15:30), Max: 36.6 (01 Apr 2024 15:30)  T(F): 97.9 (01 Apr 2024 15:30), Max: 97.9 (01 Apr 2024 15:30)  HR: 84 (01 Apr 2024 15:30) (84 - 98)  BP: 194/84 (01 Apr 2024 15:30) (167/74 - 194/84)  BP(mean): --  ABP: --  ABP(mean): --  RR: 18 (01 Apr 2024 15:30) (18 - 19)  SpO2: 98% (01 Apr 2024 15:30) (98% - 98%)    O2 Parameters below as of 01 Apr 2024 15:30  Patient On (Oxygen Delivery Method): room air    I&O's Detail    CBC Full  -  ( 01 Apr 2024 12:05 )  WBC Count : 14.59 K/uL  RBC Count : 3.13 M/uL  Hemoglobin : 9.8 g/dL  Hematocrit : 27.4 %  Platelet Count - Automated : 339 K/uL  Mean Cell Volume : 87.5 fL  Mean Cell Hemoglobin : 31.3 pg  Mean Cell Hemoglobin Concentration : 35.8 g/dL  Auto Neutrophil # : 11.31 K/uL  Auto Lymphocyte # : 2.22 K/uL  Auto Monocyte # : 0.85 K/uL  Auto Eosinophil # : 0.04 K/uL  Auto Basophil # : 0.05 K/uL  Auto Neutrophil % : 77.6 %  Auto Lymphocyte % : 15.2 %  Auto Monocyte % : 5.8 %  Auto Eosinophil % : 0.3 %  Auto Basophil % : 0.3 %    04-01    134<L>  |  99  |  18  ----------------------------<  103<H>  3.8   |  21  |  0.7    Ca    9.0      01 Apr 2024 12:05  Mg     1.9     04-01    TPro  6.8  /  Alb  4.4  /  TBili  0.7  /  DBili  x   /  AST  19  /  ALT  14  /  AlkPhos  56  04-01    CARDIAC MARKERS ( 01 Apr 2024 12:05 )  x     / x     / 125 U/L / x     / x          Urinalysis Basic - ( 01 Apr 2024 12:05 )    Color: x / Appearance: x / SG: x / pH: x  Gluc: 103 mg/dL / Ketone: x  / Bili: x / Urobili: x   Blood: x / Protein: x / Nitrite: x   Leuk Esterase: x / RBC: x / WBC x   Sq Epi: x / Non Sq Epi: x / Bacteria: x        AAOX3. Verbal function intact  tongue midline, facial motions symmetric  PERRLA, EOMI  Pronator Drift: neg  Finger to Nose intact  Motor: MAEx4, 5/5 power in b/l UE and LE, RLE splinted  Sensation: intact to touch in all extremities  tenderness to palpation at T7    Imaging: < from: CT Chest w/ IV Cont (04.01.24 @ 14:41) >    IMPRESSION:    Compression deformity of the T7 vertebral body of indeterminate age.    Prominent main pulmonary artery may seen with pulmonary arterial   hypertension.    < end of copied text >    Assessment/Plan- patient with pain at T7 compression fracture s/p fall x2 yesterday, recommend conservative management with pain management PT and modalities. Patient can follow up with Dr Mark in the office in 4 weeks

## 2024-04-02 ENCOUNTER — RESULT REVIEW (OUTPATIENT)
Age: 74
End: 2024-04-02

## 2024-04-02 LAB
ALBUMIN SERPL ELPH-MCNC: 4.3 G/DL — SIGNIFICANT CHANGE UP (ref 3.5–5.2)
ALP SERPL-CCNC: 54 U/L — SIGNIFICANT CHANGE UP (ref 30–115)
ALT FLD-CCNC: 13 U/L — SIGNIFICANT CHANGE UP (ref 0–41)
ANION GAP SERPL CALC-SCNC: 13 MMOL/L — SIGNIFICANT CHANGE UP (ref 7–14)
AST SERPL-CCNC: 16 U/L — SIGNIFICANT CHANGE UP (ref 0–41)
BASOPHILS # BLD AUTO: 0.06 K/UL — SIGNIFICANT CHANGE UP (ref 0–0.2)
BASOPHILS NFR BLD AUTO: 0.5 % — SIGNIFICANT CHANGE UP (ref 0–1)
BILIRUB SERPL-MCNC: 0.7 MG/DL — SIGNIFICANT CHANGE UP (ref 0.2–1.2)
BUN SERPL-MCNC: 14 MG/DL — SIGNIFICANT CHANGE UP (ref 10–20)
CALCIUM SERPL-MCNC: 9.2 MG/DL — SIGNIFICANT CHANGE UP (ref 8.4–10.5)
CHLORIDE SERPL-SCNC: 101 MMOL/L — SIGNIFICANT CHANGE UP (ref 98–110)
CO2 SERPL-SCNC: 21 MMOL/L — SIGNIFICANT CHANGE UP (ref 17–32)
CREAT SERPL-MCNC: 0.6 MG/DL — LOW (ref 0.7–1.5)
EGFR: 95 ML/MIN/1.73M2 — SIGNIFICANT CHANGE UP
EOSINOPHIL # BLD AUTO: 0.14 K/UL — SIGNIFICANT CHANGE UP (ref 0–0.7)
EOSINOPHIL NFR BLD AUTO: 1.1 % — SIGNIFICANT CHANGE UP (ref 0–8)
FERRITIN SERPL-MCNC: 205 NG/ML — SIGNIFICANT CHANGE UP (ref 13–330)
GLUCOSE SERPL-MCNC: 102 MG/DL — HIGH (ref 70–99)
HCT VFR BLD CALC: 26.7 % — LOW (ref 37–47)
HCV AB S/CO SERPL IA: 0.04 COI — SIGNIFICANT CHANGE UP
HCV AB SERPL-IMP: SIGNIFICANT CHANGE UP
HGB BLD-MCNC: 9.4 G/DL — LOW (ref 12–16)
IMM GRANULOCYTES NFR BLD AUTO: 0.5 % — HIGH (ref 0.1–0.3)
IRON SATN MFR SERPL: 14 % — LOW (ref 15–50)
IRON SATN MFR SERPL: 31 UG/DL — LOW (ref 35–150)
LYMPHOCYTES # BLD AUTO: 1.84 K/UL — SIGNIFICANT CHANGE UP (ref 1.2–3.4)
LYMPHOCYTES # BLD AUTO: 14.9 % — LOW (ref 20.5–51.1)
MAGNESIUM SERPL-MCNC: 2.1 MG/DL — SIGNIFICANT CHANGE UP (ref 1.8–2.4)
MCHC RBC-ENTMCNC: 31.1 PG — HIGH (ref 27–31)
MCHC RBC-ENTMCNC: 35.2 G/DL — SIGNIFICANT CHANGE UP (ref 32–37)
MCV RBC AUTO: 88.4 FL — SIGNIFICANT CHANGE UP (ref 81–99)
MONOCYTES # BLD AUTO: 0.8 K/UL — HIGH (ref 0.1–0.6)
MONOCYTES NFR BLD AUTO: 6.5 % — SIGNIFICANT CHANGE UP (ref 1.7–9.3)
NEUTROPHILS # BLD AUTO: 9.49 K/UL — HIGH (ref 1.4–6.5)
NEUTROPHILS NFR BLD AUTO: 76.5 % — HIGH (ref 42.2–75.2)
NRBC # BLD: 0 /100 WBCS — SIGNIFICANT CHANGE UP (ref 0–0)
PLATELET # BLD AUTO: 353 K/UL — SIGNIFICANT CHANGE UP (ref 130–400)
PMV BLD: 9.7 FL — SIGNIFICANT CHANGE UP (ref 7.4–10.4)
POTASSIUM SERPL-MCNC: 4.1 MMOL/L — SIGNIFICANT CHANGE UP (ref 3.5–5)
POTASSIUM SERPL-SCNC: 4.1 MMOL/L — SIGNIFICANT CHANGE UP (ref 3.5–5)
PROT SERPL-MCNC: 6.6 G/DL — SIGNIFICANT CHANGE UP (ref 6–8)
RBC # BLD: 3.02 M/UL — LOW (ref 4.2–5.4)
RBC # FLD: 13 % — SIGNIFICANT CHANGE UP (ref 11.5–14.5)
SODIUM SERPL-SCNC: 135 MMOL/L — SIGNIFICANT CHANGE UP (ref 135–146)
TIBC SERPL-MCNC: 223 UG/DL — SIGNIFICANT CHANGE UP (ref 220–430)
UIBC SERPL-MCNC: 192 UG/DL — SIGNIFICANT CHANGE UP (ref 110–370)
WBC # BLD: 12.39 K/UL — HIGH (ref 4.8–10.8)
WBC # FLD AUTO: 12.39 K/UL — HIGH (ref 4.8–10.8)

## 2024-04-02 PROCEDURE — 93010 ELECTROCARDIOGRAM REPORT: CPT

## 2024-04-02 PROCEDURE — 99223 1ST HOSP IP/OBS HIGH 75: CPT

## 2024-04-02 PROCEDURE — 93306 TTE W/DOPPLER COMPLETE: CPT | Mod: 26

## 2024-04-02 PROCEDURE — 99232 SBSQ HOSP IP/OBS MODERATE 35: CPT

## 2024-04-02 PROCEDURE — 99222 1ST HOSP IP/OBS MODERATE 55: CPT

## 2024-04-02 RX ORDER — SOD SULF/SODIUM/NAHCO3/KCL/PEG
4000 SOLUTION, RECONSTITUTED, ORAL ORAL ONCE
Refills: 0 | Status: DISCONTINUED | OUTPATIENT
Start: 2024-04-02 | End: 2024-04-02

## 2024-04-02 RX ORDER — PANTOPRAZOLE SODIUM 20 MG/1
40 TABLET, DELAYED RELEASE ORAL EVERY 12 HOURS
Refills: 0 | Status: DISCONTINUED | OUTPATIENT
Start: 2024-04-02 | End: 2024-04-04

## 2024-04-02 RX ORDER — TRAZODONE HCL 50 MG
150 TABLET ORAL ONCE
Refills: 0 | Status: COMPLETED | OUTPATIENT
Start: 2024-04-02 | End: 2024-04-02

## 2024-04-02 RX ORDER — AMLODIPINE BESYLATE 2.5 MG/1
5 TABLET ORAL DAILY
Refills: 0 | Status: DISCONTINUED | OUTPATIENT
Start: 2024-04-02 | End: 2024-04-06

## 2024-04-02 RX ORDER — TRAMADOL HYDROCHLORIDE 50 MG/1
50 TABLET ORAL EVERY 8 HOURS
Refills: 0 | Status: DISCONTINUED | OUTPATIENT
Start: 2024-04-02 | End: 2024-04-03

## 2024-04-02 RX ADMIN — Medication 150 MILLIGRAM(S): at 03:00

## 2024-04-02 RX ADMIN — MORPHINE SULFATE 15 MILLIGRAM(S): 50 CAPSULE, EXTENDED RELEASE ORAL at 22:11

## 2024-04-02 RX ADMIN — PANTOPRAZOLE SODIUM 40 MILLIGRAM(S): 20 TABLET, DELAYED RELEASE ORAL at 18:22

## 2024-04-02 RX ADMIN — GABAPENTIN 300 MILLIGRAM(S): 400 CAPSULE ORAL at 21:34

## 2024-04-02 RX ADMIN — MORPHINE SULFATE 15 MILLIGRAM(S): 50 CAPSULE, EXTENDED RELEASE ORAL at 07:07

## 2024-04-02 RX ADMIN — MORPHINE SULFATE 15 MILLIGRAM(S): 50 CAPSULE, EXTENDED RELEASE ORAL at 23:26

## 2024-04-02 RX ADMIN — GABAPENTIN 300 MILLIGRAM(S): 400 CAPSULE ORAL at 14:30

## 2024-04-02 RX ADMIN — GABAPENTIN 300 MILLIGRAM(S): 400 CAPSULE ORAL at 06:12

## 2024-04-02 RX ADMIN — Medication 1 SPRAY(S): at 06:13

## 2024-04-02 RX ADMIN — PANTOPRAZOLE SODIUM 40 MILLIGRAM(S): 20 TABLET, DELAYED RELEASE ORAL at 11:22

## 2024-04-02 RX ADMIN — ENOXAPARIN SODIUM 40 MILLIGRAM(S): 100 INJECTION SUBCUTANEOUS at 06:12

## 2024-04-02 RX ADMIN — GABAPENTIN 300 MILLIGRAM(S): 400 CAPSULE ORAL at 00:27

## 2024-04-02 RX ADMIN — TRAMADOL HYDROCHLORIDE 50 MILLIGRAM(S): 50 TABLET ORAL at 14:30

## 2024-04-02 RX ADMIN — TRAMADOL HYDROCHLORIDE 50 MILLIGRAM(S): 50 TABLET ORAL at 15:00

## 2024-04-02 NOTE — PHYSICAL THERAPY INITIAL EVALUATION ADULT - GENERAL OBSERVATIONS, REHAB EVAL
PT IE 1:45-2:15pm. Patient left in supine in NAD. +call bell, +bed alarm, +telemetry, +casting/bandaging at R LE, +dizziness (BP measured in supine) See flowsheet - CPOE: Vital Signs Adult.

## 2024-04-02 NOTE — PATIENT PROFILE ADULT - FALL HARM RISK - TYPE OF ASSESSMENT
ATTENDING PHYSICIAN'S PROGRESS NOTES    Patient:  Martin De La Fuente      Unit/Bed:2025/2025-A    YOB: 1980    MRN: [id number]     Acct: [de-identified]     Admit date: 4/18/2022    Patient Seen, Chart, Consults notes, Labs, Radiology studies reviewed. SUBJECTIVE:   Day 1 of stay with left foot osteomyelitis. States foot ulcer for 1 months but was taking care of his mom and could not come to the hospital.    All other ROS negative except noted in HPI    Past, Family, Social History unchanged from admission. Diet:  ADULT DIET; Regular; 5 carb choices (75 gm/meal)  ADULT ORAL NUTRITION SUPPLEMENT; Breakfast, Lunch, Dinner;  Low Calorie/High Protein Oral Supplement    Medications:  Scheduled Meds:   enoxaparin  1 mg/kg SubCUTAneous BID    insulin glargine  0.25 Units/kg SubCUTAneous Nightly    insulin lispro  0.08 Units/kg SubCUTAneous TID WC    [START ON 4/20/2022] losartan  50 mg Oral Daily    aspirin  81 mg Oral Daily    escitalopram  10 mg Oral Daily    FLUoxetine  40 mg Oral Daily    metoprolol succinate  25 mg Oral Daily    pantoprazole  40 mg Oral QAM AC    atorvastatin  40 mg Oral Nightly    sodium chloride flush  5-40 mL IntraVENous 2 times per day    sennosides-docusate sodium  1 tablet Oral BID    insulin lispro  0-12 Units SubCUTAneous TID WC    insulin lispro  0-6 Units SubCUTAneous Nightly    vancomycin  1,250 mg IntraVENous Q12H    piperacillin-tazobactam  3,375 mg IntraVENous Q6H     Continuous Infusions:   sodium chloride 150 mL/hr at 04/19/22 0127    sodium chloride      dextrose       PRN Meds:sodium chloride flush, sodium chloride, polyethylene glycol, acetaminophen **OR** acetaminophen, ondansetron **OR** ondansetron, glucose, dextrose, glucagon (rDNA), dextrose, HYDROcodone 5 mg - acetaminophen **OR** HYDROcodone 5 mg - acetaminophen    OBJECTIVE:  CBC:   Recent Labs     04/18/22  1220 04/19/22  0614   WBC 14.7* 10.9*   HGB 10.9* 8.8*   * 575*     BMP: Recent Labs     04/18/22  1220 04/18/22  2149 04/19/22  0614   *  --  133*   K 4.5  --  4.4   CL 93*  --  100   CO2 19*  --  24   BUN 12  --  9   CREATININE <0.5*  --  0.8*   GLUCOSE 15* 212 191*     Calcium:  Recent Labs     04/19/22  0614   CALCIUM 7.7*     Ionized Calcium:No results for input(s): IONCA in the last 72 hours. Magnesium:No results for input(s): MG in the last 72 hours. Phosphorus:No results for input(s): PHOS in the last 72 hours. BNP:No results for input(s): BNP in the last 72 hours. Glucose:  Recent Labs     04/19/22  1209 04/19/22  1505 04/19/22  1658   POCGLU 72 165* 231*     HgbA1C:   Recent Labs     04/18/22  1220   LABA1C 10.4*     INR:   Recent Labs     04/19/22  0614   INR 1.14     Hepatic:   Recent Labs     04/19/22  0614   ALKPHOS 78  78   ALT 6*  6*   AST 9*  9*   PROT 7.1  7.1   BILITOT 0.4  0.4   BILIDIR 0.2   LABALBU 2.5*  2.5*     Amylase and Lipase:No results for input(s): LACTA, AMYLASE in the last 72 hours. Lactic Acid: No results for input(s): LACTA in the last 72 hours. Troponin: No results for input(s): CKTOTAL, CKMB, TROPONINT in the last 72 hours. BNP: No results for input(s): BNP in the last 72 hours. Lipids: No results for input(s): CHOL, TRIG, HDL, LDL, LDLCALC in the last 72 hours.   ABGs: No results found for: PH, PCO2, PO2, HCO3, O2SAT    Radiology reports as per the Radiologist  Radiology:   Echocardiogram complete 2D with doppler with color  Result Date: 4/19/2022  Transthoracic Echocardiography Report (TTE)  Demographics   Patient Name       Cele Santana      Date of Study       04/19/2022   Date of Birth      1980         Gender              Male   Age                39 year(s)         Race                   Patient Number     [id number]         Room Number         2025   Visit Number       [id number]   Corporate ID       [id number]   Accession Number   [id number]         [address] [place] [address] [facility], T   Ordering Physician Alley Smith [place] Ly Maggie         Physician           MD  Procedure Type of Study   TTE procedure:ECHOCARDIOGRAM COMPLETE 2D W DOPPLER W COLOR. Procedure Date Date: 04/19/2022 Start: 10:32 AM Study Location: Portable Technical Quality: Adequate visualization Indications:Dyspnea/SOB. Patient Status: Routine Height: 72 inches Weight: 170 pounds BSA: 1.99 m2 BMI: 23.06 kg/m2 HR: 81 bpm  Conclusions   Summary  Left ventricular systolic function is normal.  Ejection fraction is visually estimated at 55%. No significant valvular disease noted. No evidence of any pericardial effusion. Signature   ------------------------------------------------------------------  Electronically signed by Goldie Kumar MD (Interpreting  physician) on 04/19/2022 at 12:42 PM  ------------------------------------------------------------------   Findings   Left Ventricle  Left ventricular systolic function is normal.  Ejection fraction is visually estimated at 55%. Normal diastolic function. Left ventricle size is normal.  No regional wall motion abnormalities. Left Atrium  Mildly dilated left atrium. Right Atrium  Essentially normal right atrium. Right Ventricle  Essentially normal right ventricle. Aortic Valve  Structurally normal aortic valve. Mitral Valve  Structurally normal mitral valve. Tricuspid Valve  Structurally normal tricuspid valve. Pulmonic Valve  The pulmonic valve was not well visualized. Pericardial Effusion  No evidence of any pericardial effusion. Pleural Effusion  No evidence of pleural effusion. Miscellaneous  Inferior vena cava is dilated, measuring at 2.2 cm, but does collapse with  respiration. Aorta was not clearly visualized.   M-Mode/2D Measurements & Calculations       XR FOOT LEFT (MIN 3 VIEWS)  Result Date: 4/18/2022  EXAMINATION: THREE XRAY VIEWS OF THE LEFT FOOT 4/18/2022 12:04 pm COMPARISON: 06/12/2021 HISTORY: ORDERING SYSTEM PROVIDED HISTORY: left foot pain TECHNOLOGIST PROVIDED HISTORY: Reason for exam:->left foot pain Reason for Exam: wound infection FINDINGS: Extensive bony destruction has developed throughout the proximal phalanx 1st digit, 1st metatarsal head and base of the distal phalanx 1st digit. There is diffuse osteopenia and osteolysis in the 1st metatarsal extending into the base with superimposed pathologic fracture. Chronic appearing periosteal reaction is present in the 2nd metatarsal. Areas of bony fragmentation are noted in the distal phalanx 2nd digit concerning for acute osteomyelitis. Subtle areas of suspected osteolysis are noted in the 2nd metatarsal head. Remote postoperative changes from 3rd metatarsal amputation. The 4th and 5th digits are intact. Midfoot and hindfoot bones are intact. Interval fragmentation and osteopenia throughout the 1st metatarsal.  Absence or bony destruction of the proximal phalanx 1st digit with diffuse soft tissue swelling. Additional bony destruction in the proximal phalanx. Overall findings concerning for acute osteomyelitis. Correlate for any interval surgery. Bony destruction of the distal tuft, distal phalanx 2nd digit concerning for acute osteomyelitis. Questionable osteomyelitis in the 2nd metatarsal head. XR FOOT RIGHT (MIN 3 VIEWS)  Result Date: 4/18/2022  EXAMINATION: THREE XRAY VIEWS OF THE RIGHT FOOT 4/18/2022 12:27 pm COMPARISON: 01/04/2021 HISTORY: ORDERING SYSTEM PROVIDED HISTORY: ulcer right foot TECHNOLOGIST PROVIDED HISTORY: Reason for exam:->ulcer right foot Reason for Exam: ulcer right foot FINDINGS: Status post transmetatarsal amputation across the forefoot. No radiopaque foreign body in the soft tissues. No acute fracture. No acute periostitis or bony destruction. Linear ulcer adjacent to the 1st metatarsal remnant is identified.    Chest No acute periostitis or bony destruction. Linear ulcer adjacent to the 1st metatarsal remnant. VL DUP LOWER EXTREMITY VENOUS LEFT  Addendum Date: 4/18/2022    ADDENDUM: Critical results were called by Dr. Jojo Bates to [facility] on 4/18/2022 at 14:11. Result Date: 4/18/2022  EXAMINATION: DUPLEX VENOUS ULTRASOUND OF THE LEFT LOWER EXTREMITY 4/18/2022 1:22 pm TECHNIQUE: Duplex ultrasound using B-mode/gray scaled imaging and Doppler spectral analysis and color flow was obtained of the deep venous structures of the left extremity. COMPARISON: 06/03/2018 HISTORY: ORDERING SYSTEM PROVIDED HISTORY: ro dvt, leg pain TECHNOLOGIST PROVIDED HISTORY: Reason for exam:->ro dvt, leg pain Reason for Exam: wound to left foot, pain to lower leg, swelling from knee down x 3 mos FINDINGS: Occlusive thrombus in 1 of the peroneal veins. The visualized veins of the left lower extremity are otherwise patent and free of echogenic thrombus. The veins demonstrate good compressibility with normal color flow study and spectral analysis. Similar inguinal lymphadenopathy since at least 2021.   1. Occlusive thrombus in 1 of the peroneal veins. 2. Similar inguinal lymphadenopathy since at least 2021. Physical Exam:  Vitals: BP (!) 144/84   Pulse 80   Temp 98 °F (36.7 °C) (Oral)   Resp 20   Ht 6' (1.829 m)   Wt 190 lb 14.7 oz (86.6 kg)   SpO2 97%   BMI 25.89 kg/m²   24 hour intake/output:    Intake/Output Summary (Last 24 hours) at 4/19/2022 1721  Last data filed at 4/19/2022 0700  Gross per 24 hour   Intake 100 ml   Output 1175 ml   Net -1075 ml     Last 3 weights:   Wt Readings from Last 3 Encounters:   04/18/22 190 lb 14.7 oz (86.6 kg)   01/24/22 188 lb (85.3 kg)   08/09/21 172 lb (78 kg)     General appearance - alert, well appearing, and in no distress  HEENT: Normocephalic, Atraumatic, Conjuctiva pink, PERRL, Oral mucosa normal, Lips, teeth and gums normal, Trachea midline, Thyroid normal and No noted lymphadenopathy  Chest - clear to auscultation, no wheezes, rales or rhonchi, symmetric air entry  Cardiovascular - normal rate, regular rhythm, normal S1, S2, no murmurs, rubs, clicks or gallops  Abdomen - soft, nontender, nondistended, no masses or organomegaly   Neurological - Alert and oriented, Normal speech, No focal findings or movement disorder noted and Motor and sensory grossly normal bilaterally  Integumentary - Skin color, texture, turgor normal. No Rashes or lesions  Musculoskeletal -slight left ankle edema. Dressing left foot., Full ROM times 4 extremities and No clubbing or cyanosis  S/ post transmetatarsal amputation. DVT prophylaxis: [x] Lovenox                                 [] SCDs                                 [] SQ Heparin                                 [] Encourage ambulation           [] Already on Anticoagulation                 ASSESSMENT / PLAN :    Principal Problem:    Acute osteomyelitis ([address]  Resolved Problems:    * No resolved hospital problems. *  Bert Soto is a 39 y.o. male with a pmh of osteomyelitis, hypertension, uncontrolled type 2 diabetes who presents with osteomyelitis of the left lower extremity x3 months        Sepsis secondary to diabetic foot ulcer/Osteomylitis of left  lower extremity   Open wound of left  toe with complication    X-ray ankle left 3 view with bony destruction of distal tuft, distal clinics second digit concerning for acute osteomyelitis questionable osteomyelitis in the second metatarsal head. Met sepsis criteria based on leukocytosis, tachycardia, tachypnea, active source of infection  Continue Shylao and Malan  Await general surgery consult  Seen by cardiology and cleared for surgery  Wound care team following for wound dressing  Consider CT scan if pain/swelling worsens            Follow blood and wound cultures     Acute occlusive left peroneal deep venous thrombosis              Risk factors include prolonged immobility, infection.  Seen on venous duplex [facility]       Continue lovenox 1 mg/kg BID                 Uncontrolled Insulin dependent type 2 diabetes  Type 2 Diabetes with complication             Continue current insulin regimen              Endocrine consult              -Hold home oral antihyperglycemics, check hemoglobin A1c     Hyponatremia           Improved from 127-133           Repeat BMP in a.m.                    Essential hypertension  Hyperlipidemia              Continue home medications             Stable     DVT prophylaxis: Lovenox    CODE STATUS: Full code       Electronically signed by Lucille Lujan MD on 4/19/2022 at 5:21 PM    Rounding Hospitalist Admission

## 2024-04-02 NOTE — PATIENT PROFILE ADULT - FALL HARM RISK - HARM RISK INTERVENTIONS
Assistance with ambulation/Assistance OOB with selected safe patient handling equipment/Communicate Risk of Fall with Harm to all staff/Discuss with provider need for PT consult/Monitor gait and stability/Reinforce activity limits and safety measures with patient and family/Tailored Fall Risk Interventions/Visual Cue: Yellow wristband and red socks/Bed in lowest position, wheels locked, appropriate side rails in place/Call bell, personal items and telephone in reach/Instruct patient to call for assistance before getting out of bed or chair/Non-slip footwear when patient is out of bed/Vincent to call system/Physically safe environment - no spills, clutter or unnecessary equipment/Purposeful Proactive Rounding/Room/bathroom lighting operational, light cord in reach

## 2024-04-02 NOTE — PATIENT PROFILE ADULT - NSPROPTRIGHTSUPPORTPERSON_GEN_A_NUR
same name as above [Shortness Of Breath] : no shortness of breath [Dyspnea on exertion] : not dyspnea during exertion [Chest  Pressure] : no chest pressure [Chest Pain] : no chest pain [Lower Ext Edema] : no extremity edema [Leg Claudication] : no intermittent leg claudication [Palpitations] : palpitations [see HPI] : see HPI [Dizziness] : dizziness [Negative] : Heme/Lymph

## 2024-04-02 NOTE — PHYSICAL THERAPY INITIAL EVALUATION ADULT - NSPTDISCHREC_GEN_A_CORE
Sub-acute rehab versus Home PT depending on improvement of strength/endurance and availability of supervision at home.

## 2024-04-02 NOTE — PROGRESS NOTE ADULT - SUBJECTIVE AND OBJECTIVE BOX
----------Daily Progress Note----------    HISTORY OF PRESENT ILLNESS:  Patient is a 72 yo F w/PMH of chronic back pain, PTSD, anxiety, and depression who presented to ED after 2 episodes of syncope on the day prior to presentation w/fall w/RLE pain and back pain after the fall. She denies any history of seizure or loss of bladder/bowel control. Of note, patient has URI symptoms that started several weeks ago without improvement on amoxicillin and ciprofloxacin and had diarrhea a few days ago which she treated with imodium. She also reported having black stools for a month. Her ED vitals were notable for hypertensive urgency to  and tachycardia to 98, labs notable for WBC 14.6 and hgb 9.8, trauma work up notable for nondisplaced malleolar fracture at right ankle and compression deformity at T7, and EKG showing NS with PACs. In the ED, her right ankle was splinted. She is now admitted to telemetry for syncope workup.      Today is hospital day 1d.     INTERVAL HOSPITAL COURSE / OVERNIGHT EVENTS:    Patient was examined and seen at bedside. This morning she is resting comfortably in bed and reports no new issues or overnight events.     Review of Systems: Otherwise unremarkable     <<<<<PAST MEDICAL & SURGICAL HISTORY>>>>>  Chronic back pain    Post traumatic stress disorder (PTSD)    Depression      ALLERGIES  No Known Allergies      Home Medications:  gabapentin 600 mg oral tablet: 1 tab(s) orally 3 times a day (01 Apr 2024 18:22)  hydrocodone-acetaminophen 10 mg-325 mg oral tablet: 1 tab(s) orally 3 times a day as needed for  severe pain (01 Apr 2024 18:22)  morphine 15 mg/12 hr oral tablet, extended release: 30 milligram(s) orally 2 times a day (01 Apr 2024 18:23)        MEDICATIONS  STANDING MEDICATIONS  enoxaparin Injectable 40 milliGRAM(s) SubCutaneous every 24 hours  fluticasone propionate 50 MICROgram(s)/spray Nasal Spray 1 Spray(s) Both Nostrils two times a day  gabapentin 300 milliGRAM(s) Oral three times a day  hydrALAZINE 25 milliGRAM(s) Oral once    PRN MEDICATIONS  acetaminophen     Tablet .. 650 milliGRAM(s) Oral every 6 hours PRN  guaifenesin/dextromethorphan Oral Liquid 10 milliLiter(s) Oral every 8 hours PRN  morphine  IR 15 milliGRAM(s) Oral every 6 hours PRN    VITALS:  T(F): 99.3  HR: 77  BP: 142/68  RR: 18  SpO2: 96%    <<<<<PHYSICAL EXAM>>>>>  GENERAL: Well developed, well nourished and in no acute distress. Resting comfortably in bed.  HEENT: Normocephalic, atraumatic, mucous membranes moist, EOMI, PERRLA, bilateral sclera anicteric, no conjunctival injection  Neck: Supple, non-tender, no lymphadenopathy.  PULMONARY: Clear to auscultation bilaterally. No rales, rhonchi, or wheezing.  CARDIOVASCULAR: Regular rate and rhythm, S1-S2, no murmurs  GASTROINTESTINAL: Soft, non-tender, non-distended, no guarding.  RENAL: No CVA tenderness.  SKIN/EXTREMITIES: No clubbing or edema  NEUROLOGIC/MUSCULOSKELETAL: AOx4, grossly moving all extremities, no focal deficits.    <<<<<LABS>>>>>                        9.8    14.59 )-----------( 339      ( 01 Apr 2024 12:05 )             27.4     04-01    134<L>  |  99  |  18  ----------------------------<  103<H>  3.8   |  21  |  0.7    Ca    9.0      01 Apr 2024 12:05  Mg     1.9     04-01    TPro  6.8  /  Alb  4.4  /  TBili  0.7  /  DBili  x   /  AST  19  /  ALT  14  /  AlkPhos  56  04-01    PT/INR - ( 01 Apr 2024 12:05 )   PT: 12.40 sec;   INR: 1.09 ratio         PTT - ( 01 Apr 2024 12:05 )  PTT:25.3 sec  Urinalysis Basic - ( 01 Apr 2024 12:05 )    Color: x / Appearance: x / SG: x / pH: x  Gluc: 103 mg/dL / Ketone: x  / Bili: x / Urobili: x   Blood: x / Protein: x / Nitrite: x   Leuk Esterase: x / RBC: x / WBC x   Sq Epi: x / Non Sq Epi: x / Bacteria: x        Creatine Kinase, Serum: 125 U/L (04-01-24 @ 12:05)    [id number]  CARDIAC MARKERS ( 01 Apr 2024 12:05 )  x     / x     / 125 U/L / x     / x            <<<<<RADIOLOGY>>>>>          ----------------------------------------------------------------------------------------------------------------------------------------------------------------------------------------------- ----------Daily Progress Note----------    HISTORY OF PRESENT ILLNESS:  Patient is a 72 yo F w/PMH of chronic back pain, PTSD, anxiety, and depression who presented to ED after 2 episodes of syncope on the day prior to presentation w/fall w/RLE pain and back pain after the fall. She denies any history of seizure or loss of bladder/bowel control. Of note, patient has URI symptoms that started several weeks ago without improvement on amoxicillin and ciprofloxacin and had diarrhea a few days ago which she treated with imodium. She also reported having black stools for a month. Her ED vitals were notable for hypertensive urgency to  and tachycardia to 98, labs notable for WBC 14.6 and hgb 9.8, trauma work up notable for nondisplaced malleolar fracture at right ankle and compression deformity at T7, and EKG showing NS with PACs. In the ED, her right ankle was splinted. She is now admitted to telemetry for syncope workup.      Today is hospital day 1d.     INTERVAL HOSPITAL COURSE / OVERNIGHT EVENTS:    Patient was examined and seen at bedside. This morning she is resting comfortably in bed and reports no new issues or overnight events.     Review of Systems: Otherwise unremarkable     <<<<<PAST MEDICAL & SURGICAL HISTORY>>>>>  Chronic back pain    Post traumatic stress disorder (PTSD)    Depression      ALLERGIES  No Known Allergies      Home Medications:  gabapentin 600 mg oral tablet: 1 tab(s) orally 3 times a day (01 Apr 2024 18:22)  hydrocodone-acetaminophen 10 mg-325 mg oral tablet: 1 tab(s) orally 3 times a day as needed for  severe pain (01 Apr 2024 18:22)  morphine 15 mg/12 hr oral tablet, extended release: 30 milligram(s) orally 2 times a day (01 Apr 2024 18:23)        MEDICATIONS  STANDING MEDICATIONS  enoxaparin Injectable 40 milliGRAM(s) SubCutaneous every 24 hours  fluticasone propionate 50 MICROgram(s)/spray Nasal Spray 1 Spray(s) Both Nostrils two times a day  gabapentin 300 milliGRAM(s) Oral three times a day  hydrALAZINE 25 milliGRAM(s) Oral once    PRN MEDICATIONS  acetaminophen     Tablet .. 650 milliGRAM(s) Oral every 6 hours PRN  guaifenesin/dextromethorphan Oral Liquid 10 milliLiter(s) Oral every 8 hours PRN  morphine  IR 15 milliGRAM(s) Oral every 6 hours PRN    VITALS:  T(F): 99.3  HR: 77  BP: 142/68  RR: 18  SpO2: 96%    <<<<<PHYSICAL EXAM>>>>>  GENERAL: NAD  HEENT: Normocephalic, atraumatic, mucous membranes moist, EOMI, PERRLA, bilateral sclera anicteric, has conjunctival pallor  PULMONARY: Clear to auscultation bilaterally. No wheezing or crackles  CARDIOVASCULAR: Regular rate and rhythm, S1-S2, no murmurs, rubs, or gallops  GASTROINTESTINAL: Soft, non-distended, TTP at RUQ, RLQ, and epigastrum, no guarding  BACK: TTP at middle and lower back  SKIN/EXTREMITIES: Warm, 2+ tibialis posterior pulses, no LLE edema, RLE w/ACE wrapping from foot to knee  NEUROLOGIC/MUSCULOSKELETAL: AOx4, grossly moving all extremities, no focal deficits    <<<<<LABS>>>>>                        9.8    14.59 )-----------( 339      ( 01 Apr 2024 12:05 )             27.4     04-01    134<L>  |  99  |  18  ----------------------------<  103<H>  3.8   |  21  |  0.7    Ca    9.0      01 Apr 2024 12:05  Mg     1.9     04-01    TPro  6.8  /  Alb  4.4  /  TBili  0.7  /  DBili  x   /  AST  19  /  ALT  14  /  AlkPhos  56  04-01    PT/INR - ( 01 Apr 2024 12:05 )   PT: 12.40 sec;   INR: 1.09 ratio         PTT - ( 01 Apr 2024 12:05 )  PTT:25.3 sec  Urinalysis Basic - ( 01 Apr 2024 12:05 )    Color: x / Appearance: x / SG: x / pH: x  Gluc: 103 mg/dL / Ketone: x  / Bili: x / Urobili: x   Blood: x / Protein: x / Nitrite: x   Leuk Esterase: x / RBC: x / WBC x   Sq Epi: x / Non Sq Epi: x / Bacteria: x        Creatine Kinase, Serum: 125 U/L (04-01-24 @ 12:05)    [id number]  CARDIAC MARKERS ( 01 Apr 2024 12:05 )  x     / x     / 125 U/L / x     / x            <<<<<RADIOLOGY>>>>>          -----------------------------------------------------------------------------------------------------------------------------------------------------------------------------------------------

## 2024-04-02 NOTE — PHYSICAL THERAPY INITIAL EVALUATION ADULT - PATIENT/FAMILY AGREES WITH PLAN
Progress Notes by Aidee Vela, RN at 03/18/17 08:55 AM     Author:  Aidee Vela, RN Service:  (none) Author Type:  Registered Nurse     Filed:  03/18/17 09:20 AM Encounter Date:  3/18/2017 Status:  Signed     :  Aidee Vela RN (Registered Nurse)            Received outside correspondence via fax from[JS1.1T] [facility][JS1.1M] Pediatr[JS1.1T] Urology Dr. Dobbins[JS1.1M] regarding patient's appointment on[JS1.1T] 3/17/17[JS1.1M].    Plan:[JS1.1T]  Per urology RN, \"Richard will need a repeat ua/cs in 4-5 weeks' time. Mom is aware and asked that we fax orders, so you can keep them on file until that time. Thanks.\"[JS1.1M]     Records placed on [JS1.1T] [facility]'s[JS1.1M] desk for review and will be sent to scanning.    Referral needed:[JS1.1T]  No[JS1.1M]    MD Name:[JS1.1T]  [facility]'s Urology Dept[JS1.1M]  Address:  [address] [place] [id number]  Phone: [phone]][phone][JS1.1M]      Revision History        User Key Date/Time User Provider Type Action    > JS1.1 03/18/17 09:20 AM Aidee Vela, RN Registered Nurse Sign    M - Manual, T - Template             Continue rehab to improve functional mobility./yes

## 2024-04-02 NOTE — CONSULT NOTE ADULT - SUBJECTIVE AND OBJECTIVE BOX
PAIN MANAGEMENT CONSULT NOTE    Chief Complaint: pain    HPI:  73-year-old female with a past medical history of chronic back pain, PTSD, anxiety, and depression who presents to the ED for evaluation.  Reports that she syncopized twice yesterday while she was going to her kitchen to drink water.  Endorses pain in her right lower extremity and back.  Denies history of seizure, loss of bladder/bowel control, and pulm lining.  Denies fever, chest pain, shortness breath, nausea, vomiting, abdominal pain, hematuria, dysuria, melena, and hematochezia.    Patient has been sick several weeks ago with URI sx. Still endorses cough. Went to PCP and was prescribed amoxicillin and ciprofloxacin without symptom improvement. Had diarrhea few days ago treated with imodium yesterday.     At the ED, patient was afebrile, hypertensive (SBP max 194), tachycardic (max HR 98), remains on RA. Labs significant for leukocytosis (14,6K), anemia (Hb- 9.8). Trauma imaging workup (+) for nondisplaced malleolar fracture at right ankle and compression deformity at T7. EKG shows NS with PACs    Right ankle was splinted. Neurosurgery saw patient- no acute surgical intervention and follow up outpatient in 4 weeks. Admit to telemetry for syncope workup. HTN has improved with SBP 150s.  (01 Apr 2024 17:09)      PAST MEDICAL & SURGICAL HISTORY:  Chronic back pain      Post traumatic stress disorder (PTSD)      Depression          FAMILY HISTORY:      SOCIAL HISTORY:  [x ] Denies Smoking, Alcohol, or Drug Use    HOME MEDICATIONS:   Please refer to initial HNP    PAIN HOME MEDICATIONS:    Allergies    No Known Allergies    Intolerances        PAIN MEDICATIONS:  acetaminophen     Tablet .. 650 milliGRAM(s) Oral every 6 hours PRN  gabapentin 300 milliGRAM(s) Oral three times a day  morphine  IR 15 milliGRAM(s) Oral every 6 hours PRN  traMADol 50 milliGRAM(s) Oral every 8 hours PRN    Heme:    Antibiotics:    Cardiovascular:  amLODIPine   Tablet 5 milliGRAM(s) Oral daily  hydrALAZINE 25 milliGRAM(s) Oral once    GI:  pantoprazole  Injectable 40 milliGRAM(s) IV Push every 12 hours    Endocrine:    All Other Medications:  fluticasone propionate 50 MICROgram(s)/spray Nasal Spray 1 Spray(s) Both Nostrils two times a day      Vital Signs Last 24 Hrs  T(C): 37.2 (02 Apr 2024 12:11), Max: 37.4 (02 Apr 2024 05:09)  T(F): 99 (02 Apr 2024 12:11), Max: 99.3 (02 Apr 2024 05:09)  HR: 74 (02 Apr 2024 12:11) (74 - 81)  BP: 144/94 (02 Apr 2024 14:14) (142/68 - 186/86)  BP(mean): --  RR: 18 (02 Apr 2024 12:11) (18 - 18)  SpO2: --        LABS:                        9.4    12.39 )-----------( 353      ( 02 Apr 2024 07:06 )             26.7     04-02    135  |  101  |  14  ----------------------------<  102<H>  4.1   |  21  |  0.6<L>    Ca    9.2      02 Apr 2024 07:06  Mg     2.1     04-02    TPro  6.6  /  Alb  4.3  /  TBili  0.7  /  DBili  x   /  AST  16  /  ALT  13  /  AlkPhos  54  04-02    PT/INR - ( 01 Apr 2024 12:05 )   PT: 12.40 sec;   INR: 1.09 ratio         PTT - ( 01 Apr 2024 12:05 )  PTT:25.3 sec  Urinalysis Basic - ( 02 Apr 2024 07:06 )    Color: x / Appearance: x / SG: x / pH: x  Gluc: 102 mg/dL / Ketone: x  / Bili: x / Urobili: x   Blood: x / Protein: x / Nitrite: x   Leuk Esterase: x / RBC: x / WBC x   Sq Epi: x / Non Sq Epi: x / Bacteria: x        RADIOLOGY:        REVIEW OF SYSTEMS:  see hpi    PHYSICAL EXAM  GENERAL: Laying in bed, NAD  Neuro:  EOMI  Cranial nerves grossly intact  CHEST/LUNG: no audible wheeze, no accessory muscle usage  HEART: Regular rate and rhythm; No edema        ASSESSMENT:   ankle pain  low back pain    PLAN:   - Pain:  increase gabapentin to 400mg tid  change tylenol to 975 q8hr  c/w tramadol 50mg q6hr prn  c.w morphine PO 15mg q6hr prn

## 2024-04-02 NOTE — CONSULT NOTE ADULT - ASSESSMENT
Assessment and Plan  Case of a 73 years old female patient known to have a history of anxiety, depression, PTSD, left breast lumpectomy, and recent URTI s/p amoxicillin followed by ciprofloxacin course, who was brought to the ED on 04/01 following 2 episodes of syncope in the setting of recent reported melena, found to be stable with evidence of drop in Hb from 12.8 in 2015 to 9.8 on presentation, admitted for further management. We are consulted for concern of drop in Hb in setting of reported melena.      Reported Melena with Suspected Acute on Chronic Anemia  Rule Out PUD VS AVM VS Esophageal Ulcer VS Erosive Gastritis VS Dieulafoy lesion VS Malignancy   Reported Diarrhea in Setting of Recent Antibiotic Use  Diverticulosis on Imaging  * Presentation: 2 episodes of syncope on 03/31; had recent URTI s/p amoxicillin course in end of February with no improvement and s/p ciprofloxacin until 2 weeks ago; had epigastric burning discomfort in mid March s/p Naproxen for few days; associated with nausea, dry heaves, and change in BMs (had black mushy pasty stools once daily since then until few days ago when BMs became more frequent); she also notes associated weakness and dizziness; noted weight loss from 165 to 149 lb unintentionally over 3 months  * Baseline hemoglobin (prior to admission): 12.8 in 2015  * ED Vitals:  /74 mmHg, HR 98 bpm   * Hemoglobin on admission: Hb 9.8 on 04/01  * Coags: INR 1.09 on 04/01  * Not on AC or AP  * BRIAN empty vault with black tinge  * CT AP IC 04/01 noted with fatty infiltration along falciform ligament; diverticulosis  * No prior EGD  * Last colonoscopy was 5 years ago by Dr Borja: unremarkable per patient  * Family History: gastric cancer in mother    RECOMMENDATIONS  - Will tentatively schedule patient for EGD and colonoscopy on 04/03 pending TTE (concern for pulmonary HTN on CT; if pulmonary HTN is severe, would recommend pulmonary risk stratification)  - Please start clear liquid diet and keep NPO after midnight  - Please check preop labs tonight: CBC, BMP, Mg, and INR  - Please administer 4l of golytely during day and Dulcolax 20mg at bedtime   - Trend CBC closely BID and transfuse as needed (target Hb >8). Maintain active Type and screen  - Check iron studies  - Trend BUN; ensure placement of x2 large 18G IV Bores  - Please start pantoprazole 40 IV BID  - Monitor BM for recurrent melena. In case of recurrent diarrhea, would check GI PCR, stool culture, and Clostridium Difficile   - Please avoid any NSAIDs  - If any unstable bleed, please call GI stat        Thank you for your consult.  - Please note that plan was communicated with medical team.   - Please reach GI on [phone] during weekdays till 5pm.  - Please call the GI service line after 5pm on Weekdays and anytime on Weekends: [phone].      Mason Artis MD  PGY - 4 Gastroenterology Fellow   [facility]     Assessment and Plan  Case of a 73 years old female patient known to have a history of anxiety, depression, PTSD, left breast lumpectomy, and recent URTI s/p amoxicillin followed by ciprofloxacin course, who was brought to the ED on 04/01 following 2 episodes of syncope in the setting of recent reported melena, found to be stable with evidence of drop in Hb from 12.8 in 2015 to 9.8 on presentation, admitted for further management. We are consulted for concern of drop in Hb in setting of reported melena.      Reported Melena with Suspected Acute on Chronic Anemia  Rule Out PUD VS AVM VS Esophageal Ulcer VS Erosive Gastritis VS Dieulafoy lesion VS Malignancy   Reported Diarrhea in Setting of Recent Antibiotic Use  Diverticulosis on Imaging  * Presentation: 2 episodes of syncope on 03/31; had recent URTI s/p amoxicillin course in end of February with no improvement and s/p ciprofloxacin until 2 weeks ago; had epigastric burning discomfort in mid March s/p Naproxen for few days; associated with nausea, dry heaves, and change in BMs (had black mushy pasty stools once daily since then until few days ago when BMs became more frequent); she also notes associated weakness and dizziness; noted weight loss from 165 to 149 lb unintentionally over 3 months  * Baseline hemoglobin (prior to admission): 12.8 in 2015  * ED Vitals:  /74 mmHg, HR 98 bpm   * Hemoglobin on admission: Hb 9.8 on 04/01  * Coags: INR 1.09 on 04/01  * Not on AC or AP  * BRIAN empty vault with black tinge  * CT AP IC 04/01 noted with fatty infiltration along falciform ligament; diverticulosis  * No prior EGD  * Last colonoscopy was 5 years ago by Dr Borja: unremarkable per patient  * Family History: gastric cancer in mother    RECOMMENDATIONS  - Will tentatively schedule patient for EGD and colonoscopy on 04/04 pending TTE (concern for pulmonary HTN on CT; if pulmonary HTN is severe, would recommend pulmonary risk stratification)  - Clear liquid diet for now  - Trend CBC closely BID and transfuse as needed (target Hb >8). Maintain active Type and screen  - Check iron studies  - Trend BUN; ensure placement of x2 large 18G IV Bores  - Please start pantoprazole 40 IV BID  - Monitor BM for recurrent melena. In case of recurrent diarrhea, would check GI PCR, stool culture, and Clostridium Difficile   - Please avoid any NSAIDs  - If any unstable bleed, please call GI stat        Thank you for your consult.  - Please note that plan was communicated with medical team.   - Please reach GI on [phone] during weekdays till 5pm.  - Please call the GI service line after 5pm on Weekdays and anytime on Weekends: [phone].      Mason Artis MD  PGY - 4 Gastroenterology Fellow   [facility]
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
## 2024-04-02 NOTE — CONSULT NOTE ADULT - ATTENDING COMMENTS
Patient seen and evaluated bedside  Right ankle lateral mal tenderness, no fracture blisters, controlled swelling, non-weight bearing in posterior splint, reapplied bedside.  Left ankle sprain, tenderness along ATFL and AITBL and PITBL, compression bandage for now.   No surgical intervention   Follow up in private office in 1 week  Thank you for consultation, reach me on teams for any question
72yo female presenting with syncopal episode and reported dark stools. Weight loss also noted. No ongoing/overt GI bleeding currently. Naprosyn use reported. Recommend EGD and colonoscopy pending further syncope workup. If clinically optimized will tentatively plan procedures on 4/4. Pt agreeable with plan.

## 2024-04-02 NOTE — CONSULT NOTE ADULT - SUBJECTIVE AND OBJECTIVE BOX
Podiatry Consult Note    Subjective:  CHRIS PEREA  Seen Bedside 73y Female  .   Patient is a 73y old  Female who presents with a chief complaint of fall, syncope (02 Apr 2024 08:08)    HPI:  73-year-old female with a past medical history of chronic back pain, PTSD, anxiety, and depression who presents to the ED for evaluation.  Reports that she syncopized twice yesterday while she was going to her kitchen to drink water.  Endorses pain in her right lower extremity and back.  Denies history of seizure, loss of bladder/bowel control, and pulm lining.  Denies fever, chest pain, shortness breath, nausea, vomiting, abdominal pain, hematuria, dysuria, melena, and hematochezia.    Patient has been sick several weeks ago with URI sx. Still endorses cough. Went to PCP and was prescribed amoxicillin and ciprofloxacin without symptom improvement. Had diarrhea few days ago treated with imodium yesterday.     At the ED, patient was afebrile, hypertensive (SBP max 194), tachycardic (max HR 98), remains on RA. Labs significant for leukocytosis (14,6K), anemia (Hb- 9.8). Trauma imaging workup (+) for nondisplaced malleolar fracture at right ankle and compression deformity at T7. EKG shows NS with PACs    Right ankle was splinted. Neurosurgery saw patient- no acute surgical intervention and follow up outpatient in 4 weeks. Admit to telemetry for syncope workup. HTN has improved with SBP 150s.  (01 Apr 2024 17:09)      Past Medical History and Surgical History  PAST MEDICAL & SURGICAL HISTORY:  Chronic back pain      Post traumatic stress disorder (PTSD)      Depression           Review of Systems:  [X] Ten point review of systems is otherwise negative except as noted     Objective:  Vital Signs Last 24 Hrs  T(C): 37.2 (02 Apr 2024 12:11), Max: 37.4 (02 Apr 2024 05:09)  T(F): 99 (02 Apr 2024 12:11), Max: 99.3 (02 Apr 2024 05:09)  HR: 74 (02 Apr 2024 12:11) (74 - 84)  BP: 164/77 (02 Apr 2024 12:11) (142/68 - 194/84)  BP(mean): 112 (01 Apr 2024 16:51) (112 - 112)  RR: 18 (02 Apr 2024 12:11) (17 - 18)  SpO2: 96% (01 Apr 2024 16:51) (96% - 98%)    Parameters below as of 01 Apr 2024 16:51  Patient On (Oxygen Delivery Method): room air                            9.4    12.39 )-----------( 353      ( 02 Apr 2024 07:06 )             26.7                 04-02    135  |  101  |  14  ----------------------------<  102<H>  4.1   |  21  |  0.6<L>    Ca    9.2      02 Apr 2024 07:06  Mg     2.1     04-02    TPro  6.6  /  Alb  4.3  /  TBili  0.7  /  DBili  x   /  AST  16  /  ALT  13  /  AlkPhos  54  04-02      Posterior splint intact RLE     Assessment:  -Lateral malleolar fracture, nondisplaced right ankle    Plan:  Chart reviewed and Patient evaluated. All Questions and Concerns Addressed and Answered  XR Imaging right ankle reviewed; nondisplaced lateral malleolus fracture at ankle  Weight Bearing Status; nonweightbearing RLE with posterior splint  Fracture appears stable; likely no surgical intervention indicated  This note is in progress; final plan to be updated after attending gemma   Discussed Plan w/ Dr. Crain    Podiatry

## 2024-04-02 NOTE — PHYSICAL THERAPY INITIAL EVALUATION ADULT - NSACTIVITYREC_GEN_A_PT
"-- Message is from the [employer]--    Referral Request  Name of Specialist: Dr. Juanis Moreira specialty: Ophthalmology  Reason for referral: Annual Exa,     Is this a NEW request?: yes      Referral ordered by: patient       Insurance type: [facility] HMO       Payor: [address] [place] [address] Road / Plan: [address] [place] [address] [place] / Product Type: [employer]      Preferred Delivery Method   Mail to home (confirm address is correct in Epic demographics)    Caller Information       Type Contact Phone    02/07/2019 09:39 AM Phone (Incoming) Jono Tyson (Self) [phone] (H)          Alternative phone number: none    Turnaround time given to caller: ""This message will be sent to [facility] Provider's full name]. The clinical team will return your call as soon as they review your message. Typically, it takes 3 business days to process referral requests. \""  "
Patient educated on straight leg raises x30 and encouraged to do daily to improve sit<->stand transfers and ambulation.

## 2024-04-02 NOTE — PHYSICAL THERAPY INITIAL EVALUATION ADULT - TRANSFER TRAINING, PT EVAL
Epidermal Closure Graft Donor Site (Optional): simple interrupted Contact guard with all transfers until discharge.

## 2024-04-02 NOTE — PROGRESS NOTE ADULT - SUBJECTIVE AND OBJECTIVE BOX
ELIAZAR, CHRIS  73y, Female  Allergy: No Known Allergies    Hospital Day: 1d    Patient seen and examined. No acute events overnight    PMH/PSH:  PAST MEDICAL & SURGICAL HISTORY:  Chronic back pain      Post traumatic stress disorder (PTSD)      Depression          VITALS:  T(F): 99 (04-02-24 @ 12:11), Max: 99.3 (04-02-24 @ 05:09)  HR: 74 (04-02-24 @ 12:11)  BP: 164/77 (04-02-24 @ 12:11) (142/68 - 194/84)  RR: 18 (04-02-24 @ 12:11)  SpO2: 96% (04-01-24 @ 16:51)    TESTS & MEASUREMENTS:  Weight (Kg):                             9.4    12.39 )-----------( 353      ( 02 Apr 2024 07:06 )             26.7     PT/INR - ( 01 Apr 2024 12:05 )   PT: 12.40 sec;   INR: 1.09 ratio         PTT - ( 01 Apr 2024 12:05 )  PTT:25.3 sec  04-02    135  |  101  |  14  ----------------------------<  102<H>  4.1   |  21  |  0.6<L>    Ca    9.2      02 Apr 2024 07:06  Mg     2.1     04-02    TPro  6.6  /  Alb  4.3  /  TBili  0.7  /  DBili  x   /  AST  16  /  ALT  13  /  AlkPhos  54  04-02    LIVER FUNCTIONS - ( 02 Apr 2024 07:06 )  Alb: 4.3 g/dL / Pro: 6.6 g/dL / ALK PHOS: 54 U/L / ALT: 13 U/L / AST: 16 U/L / GGT: x           CARDIAC MARKERS ( 01 Apr 2024 12:05 )  x     / x     / 125 U/L / x     / x            Urinalysis Basic - ( 02 Apr 2024 07:06 )    Color: x / Appearance: x / SG: x / pH: x  Gluc: 102 mg/dL / Ketone: x  / Bili: x / Urobili: x   Blood: x / Protein: x / Nitrite: x   Leuk Esterase: x / RBC: x / WBC x   Sq Epi: x / Non Sq Epi: x / Bacteria: x        RADIOLOGY & ADDITIONAL TESTS:    RECENT DIAGNOSTIC ORDERS:  Prothrombin Time and INR, Plasma:  Start Date:02-Apr-2024. 16:00 (04-02-24 @ 12:14)  Magnesium: 16:00 (04-02-24 @ 12:14)  Complete Blood Count + Automated Diff: 16:00 (04-02-24 @ 12:14)  Comprehensive Metabolic Panel: 16:00 (04-02-24 @ 12:14)  Diet, NPO after Midnight:      NPO Start Date: 02-Apr-2024,   NPO Start Time: 23:59  Except Medications (04-02-24 @ 10:28)  Diet, Clear Liquid (04-02-24 @ 10:28)  EEG:   Transport: Stretcher-Crib  Hemorrhage:No  Brain Death: No  MI:No  Sickle Cell:No   Stimulants:No  Anti-Convulsants:No   Anti-Depressants:No  Contr Substances:No (04-01-24 @ 19:38)  Ferritin: AM Sched. Collection: 02-Apr-2024 04:30 (04-01-24 @ 18:44)  TTE Echo Complete w/o Contrast w/ Doppler:   Transport: Stretcher-Crib  Monitor: w/o Monitor (04-01-24 @ 18:41)  Hepatitis C Antibody Screen: AM Sched. Collection: 02-Apr-2024 04:30 (04-01-24 @ 17:38)      MEDICATIONS:  MEDICATIONS  (STANDING):  bisacodyl 20 milliGRAM(s) Oral at bedtime  enoxaparin Injectable 40 milliGRAM(s) SubCutaneous every 24 hours  fluticasone propionate 50 MICROgram(s)/spray Nasal Spray 1 Spray(s) Both Nostrils two times a day  gabapentin 300 milliGRAM(s) Oral three times a day  hydrALAZINE 25 milliGRAM(s) Oral once  pantoprazole  Injectable 40 milliGRAM(s) IV Push every 12 hours  polyethylene glycol/electrolyte Solution. 4000 milliLiter(s) Oral once    MEDICATIONS  (PRN):  acetaminophen     Tablet .. 650 milliGRAM(s) Oral every 6 hours PRN Mild Pain (1 - 3), Moderate Pain (4 - 6)  guaifenesin/dextromethorphan Oral Liquid 10 milliLiter(s) Oral every 8 hours PRN Cough  morphine  IR 15 milliGRAM(s) Oral every 6 hours PRN Severe Pain (7 - 10)  traMADol 50 milliGRAM(s) Oral every 8 hours PRN Moderate Pain (4 - 6)      HOME MEDICATIONS:  gabapentin 600 mg oral tablet (04-01)  hydrocodone-acetaminophen 10 mg-325 mg oral tablet (04-01)  morphine 15 mg/12 hr oral tablet, extended release (04-01)      REVIEW OF SYSTEMS:  All other review of systems is negative unless indicated above.     PHYSICAL EXAM:  PHYSICAL EXAM:  GENERAL: NAD  HEAD:  Atraumatic, Normocephalic  NECK: Supple, No JVD  CHEST/LUNG: Clear to auscultation bilaterally; No wheeze  HEART: Regular rate and rhythm; No murmurs, rubs, or gallops  ABDOMEN: Soft, Nontender, Nondistended; Bowel sounds present  EXTREMITIES:  2+ Peripheral Pulses, No clubbing, cyanosis, or edema  SKIN: No rashes or lesions
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
## 2024-04-02 NOTE — PATIENT PROFILE ADULT - FUNCTIONAL ASSESSMENT - BASIC MOBILITY 6.
2-calculated by average/Not able to assess (calculate score using [facility] averaging method)

## 2024-04-02 NOTE — PATIENT PROFILE ADULT - FUNCTIONAL ASSESSMENT - DAILY ACTIVITY SECTION LABEL
2 y.o. M, [facility] of asthma and eczema, BIB parents for fever and cough. Symptoms began last night, associated with 1 episode of NBNB postussive emesis. Mother has been giving tylenol with good response. Good appetite. No change in urine outpt. No diarrhea, rashes, sick contacts. On exam, VS reviewed. Pt is well appearing, in no respiratory distress. MMM. Cap refill <2 seconds. TMs normal b/l, no erythema, no dullness, no hemotympanum. Eyes normal with no injection, no discharge, EOMI.  Pharynx with no erythema, no exudates, no stomatitis, (+) hypertrophic tonsils. No anterior cervical lymph nodes appreciated. No skin rash noted. Chest is clear, no wheezing, rales or crackles. No retractions, no distress. Normal and equal breath sounds. Normal heart sounds, no muffling, no murmur appreciated. Abdomen soft, NT/ND, no guarding, no localized tenderness.  Neuro exam grossly intact. Most likely viral. Will swab throat and discharge with pediatrician follow up.
.

## 2024-04-02 NOTE — CONSULT NOTE ADULT - SUBJECTIVE AND OBJECTIVE BOX
Gastroenterology Initial Consult/ Follow Up Note    Location: [facility] 3C 027 C [facility]  Patient Name: CHRIS PEREA  Age: 73y  Gender: Female      Chief Complaint  Patient is a 73y old Female who presents with a chief complaint of fall, syncope (02 Apr 2024 06:15)    Primary diagnosis of Syncope and collapse          Reason for Consult      History of Present Illness  HPI:  73-year-old female with a past medical history of chronic back pain, PTSD, anxiety, and depression who presents to the ED for evaluation.  Reports that she syncopized twice yesterday while she was going to her kitchen to drink water.  Endorses pain in her right lower extremity and back.  Denies history of seizure, loss of bladder/bowel control, and pulm lining.  Denies fever, chest pain, shortness breath, nausea, vomiting, abdominal pain, hematuria, dysuria, melena, and hematochezia.    Patient has been sick several weeks ago with URI sx. Still endorses cough. Went to PCP and was prescribed amoxicillin and ciprofloxacin without symptom improvement. Had diarrhea few days ago treated with imodium yesterday.     At the ED, patient was afebrile, hypertensive (SBP max 194), tachycardic (max HR 98), remains on RA. Labs significant for leukocytosis (14,6K), anemia (Hb- 9.8). Trauma imaging workup (+) for nondisplaced malleolar fracture at right ankle and compression deformity at T7. EKG shows NS with PACs    Right ankle was splinted. Neurosurgery saw patient- no acute surgical intervention and follow up outpatient in 4 weeks. Admit to telemetry for syncope workup. HTN has improved with SBP 150s.  (01 Apr 2024 17:09)        Progress Note  This morning patient was seen and examined at bedside.    Today is hospital day 1d.  Patient is doing fine. No acute events overnight.   Patient's appetite is adequate, and he/she is tolerating diet and denies nausea or vomiting.   Patient denies any abdominal pain, diarrhea, constipation, melena, hematochezia, or hematemesis.  Last bowel movement was soft and was on ...      Of note:  - Medication Use: Denies any use of NSAIDs, C/S, ASA, EtOh and Antithrombotics or herbal remedies like Gingko, Garlic, and Ginseng  - GI History: Denies any history of bleeds, dx of CLD of IBD, any malignancies, dx of PUD. Patient has not had any recent GI procedures including spincterotomy or polypectomy. Denies any vascular surgical intervention and hx of radiation therapy. Patient denies any fever, abdominal pain, weight loss, dysphagia.       Prior EGD:    Prior Colonoscopy:    Past Medical and Surgical History:  Chronic back pain    Post traumatic stress disorder (PTSD)    Depression        Home Medications:  Home Medications:  gabapentin 600 mg oral tablet: 1 tab(s) orally 3 times a day (01 Apr 2024 18:22)  hydrocodone-acetaminophen 10 mg-325 mg oral tablet: 1 tab(s) orally 3 times a day as needed for  severe pain (01 Apr 2024 18:22)  morphine 15 mg/12 hr oral tablet, extended release: 30 milligram(s) orally 2 times a day (01 Apr 2024 18:23)      Social History:  Social History:    Tobacco:  Alcohol:  Drugs:      Allergies:  No Known Allergies      Family History:  FAMILY HISTORY:  gastric cancer in mother      Vital Signs in the last 24 hours   Vitals Summary T(C): 37.4 (04-02-24 @ 05:09), Max: 37.4 (04-02-24 @ 05:09)  HR: 77 (04-02-24 @ 05:09) (77 - 98)  BP: 142/68 (04-02-24 @ 05:09) (142/68 - 194/84)  RR: 18 (04-02-24 @ 05:09) (17 - 19)  SpO2: 96% (04-01-24 @ 16:51) (96% - 98%)  Vent Data   Intake/ Output   Measurements       Physical Exam  * General Appearance: Alert, cooperative, interactive, oriented to time, place, and person, in no acute distress  * Lungs: Good bilateral air entry, normal breath sounds  * Heart: Regular Rate and Rhythm, normal S1 and S2, no audible murmur, rub, or gallop  * Abdomen: Symmetric, non-distended, no scar, soft, non-tender, bowel sounds active all four quadrants, no masses  * Rectal: empty vault with black tinge      Investigations   Laboratory Workup      - CBC:                        9.8    14.59 )-----------( 339      ( 01 Apr 2024 12:05 )             27.4       - Hgb Trend:  9.8  04-01-24 @ 12:05        - Chemistry:  04-01    134<L>  |  99  |  18  ----------------------------<  103<H>  3.8   |  21  |  0.7    Ca    9.0      01 Apr 2024 12:05  Mg     1.9     04-01    TPro  6.8  /  Alb  4.4  /  TBili  0.7  /  DBili  x   /  AST  19  /  ALT  14  /  AlkPhos  56  04-01    Liver panel trend:  TBili 0.7   /   AST 19   /   ALT 14   /   AlkP 56   /   Tptn 6.8   /   Alb 4.4    /   DBili --      04-01      - Coagulation Studies:  PT/INR - ( 01 Apr 2024 12:05 )   PT: 12.40 sec;   INR: 1.09 ratio         PTT - ( 01 Apr 2024 12:05 )  PTT:25.3 sec    - ABG:      - Cardiac Markers:  CARDIAC MARKERS ( 01 Apr 2024 12:05 )  x     / x     / 125 U/L / x     / x            Microbiological Workup  Urinalysis Basic - ( 01 Apr 2024 12:05 )    Color: x / Appearance: x / SG: x / pH: x  Gluc: 103 mg/dL / Ketone: x  / Bili: x / Urobili: x   Blood: x / Protein: x / Nitrite: x   Leuk Esterase: x / RBC: x / WBC x   Sq Epi: x / Non Sq Epi: x / Bacteria: x          Radiological Workup  CT Abdomen and Pelvis w/ IV Cont:   ACC: [id number] EXAM:  CT ABDOMEN AND PELVIS IC   ORDERED BY: SURAJ KAMINSKI     ACC: [id number] EXAM:  CT CHEST IC   ORDERED BY: SURAJ KAMINSKI     PROCEDURE DATE:  04/01/2024          INTERPRETATION:  CLINICAL STATEMENT: Fall.    TECHNIQUE: Contiguous axial CT images were obtained from the thoracic   inlet to the pubic symphysis following administration of 100 cc Omnipaque   350 intravenous contrast.  Oral contrast was not administered.    Reformatted images in the coronal, sagittal and MIP planes wereacquired.    Comparison: None available.    FINDINGS:    CHEST:    LUNGS/PLEURA/AIRWAYS: No debris within the tracheobronchial tree. No   pleural effusion, pneumothorax or focal consolidation. Areas of small   atelectasis.    MEDIASTINUM/THORACIC NODES: No mediastinal, hilar or axillary   lymphadenopathy.    HEART/GREAT VESSELS: Normal heart size. No gross pericardial effusion.   Prominent main pulmonary artery measuring 3.1 cm (series 7 image 131).   Coronary artery and thoracic aortic calcification.      ABDOMEN/PELVIS:    01. LIVER: Normal morphology. Likely focal fatty infiltration adjacent to   the falciform ligament.  02. SPLEEN: Normal.  03. PANCREAS: Normal.  04. GALLBLADDER/BILIARY TREE: CBD measures 7 mm (series 8 image 32) which   is within normal limits for age.  Normal gallbladder.  05. ADRENALS: Normal.  06. KIDNEYS: Symmetric enhancement.  No hydronephrosis or renal stone.  07. LYMPHADENOPATHY/RETROPERITONEUM: No lymphadenopathy.  08. BOWEL: Small hiatal hernia. No bowel obstruction. Colonic diverticula.  09. PELVIC VISCERA: Fibroid uterus. Unremarkable urinary bladder.  10. PELVIC LYMPH NODES: No lymphadenopathy.  11. VASCULATURE: No abdominal aortic aneurysm.  12. PERITONEUM/ABDOMINAL WALL: No gross ascites. Small fat-containing   umbilical hernia.  13. SKELETAL: Degenerative changes. Compression deformity of the T7   vertebral body of indeterminate age.      IMPRESSION:    Compression deformity of the T7 vertebral body of indeterminate age.    Prominent main pulmonary artery may seen with pulmonary arterial   hypertension.    --- End of Report ---            TYRESE MALONE MD; Attending Radiologist  This document has been electronically signed. Apr 1 2024  3:15PM (04-01-24 @ 14:41)            Current Medications  Standing Medications  enoxaparin Injectable 40 milliGRAM(s) SubCutaneous every 24 hours  fluticasone propionate 50 MICROgram(s)/spray Nasal Spray 1 Spray(s) Both Nostrils two times a day  gabapentin 300 milliGRAM(s) Oral three times a day  hydrALAZINE 25 milliGRAM(s) Oral once    PRN Medications  acetaminophen     Tablet .. 650 milliGRAM(s) Oral every 6 hours PRN Mild Pain (1 - 3), Moderate Pain (4 - 6)  guaifenesin/dextromethorphan Oral Liquid 10 milliLiter(s) Oral every 8 hours PRN Cough  morphine  IR 15 milliGRAM(s) Oral every 6 hours PRN Severe Pain (7 - 10)    Singles Doses Administered  (ADM OVERRIDE) 1 each &lt;see task&gt; GiveOnce  (ADM OVERRIDE) 1 each &lt;see task&gt; GiveOnce  morphine  - Injectable 2 milliGRAM(s) IV Push Once  morphine  - Injectable 2 milliGRAM(s) IV Push once  traZODone 150 milliGRAM(s) Oral once       Gastroenterology Initial Consult Note      [facility] 3C 027 C [facility]  Patient Name: CHRIS PEREA  Age: 73y  Gender: Female      Chief Complaint  Patient is a 73y old Female who presents with a chief complaint of fall, syncope (02 Apr 2024 06:15)  Primary diagnosis of Syncope and collapse      Reason for Consult  Melena  Anemia      History of Present Illness  73 years old female patient known to have a history of anxiety, depression, PTSD, left breast lumpectomy, and recent URTI s/p amoxicillin followed by ciprofloxacin course, who was brought to the ED on 04/01 following 2 episodes of syncope in the setting of recent reported melena, found to be stable with evidence of drop in Hb from 12.8 in 2015 to 9.8 on presentation, admitted for further management. We are consulted for concern of drop in Hb in setting of reported melena.      Summary:  * Presentation: 2 episodes of syncope on 03/31; had recent URTI s/p amoxicillin course in end of February with no improvement and s/p ciprofloxacin until 2 weeks ago; had epigastric burning discomfort in mid March s/p Naproxen for few days; associated with nausea, dry heaves, and change in BMs (had black mushy pasty stools once daily since then until few days ago when BMs became more frequent); she also notes associated weakness and dizziness; noted weight loss from 165 to 149 lb unintentionally over 3 months  * Baseline hemoglobin (prior to admission): 12.8 in 2015  * ED Vitals:  /74 mmHg, HR 98 bpm   * Hemoglobin on admission: Hb 9.8 on 04/01  * Coags: INR 1.09 on 04/01  * Not on AC or AP  * BRIAN empty vault with black tinge  * CT AP IC 04/01 noted with fatty infiltration along falciform ligament; diverticulosis  * No prior EGD  * Last colonoscopy was 5 years ago by Dr Borja: unremarkable per patient  * Family History: gastric cancer in mother        Prior EGD:  no prior      Prior Colonoscopy:  as below      Past Medical and Surgical History:  Chronic back pain  Post traumatic stress disorder (PTSD)  Depression      Home Medications:  Home Medications:  gabapentin 600 mg oral tablet: 1 tab(s) orally 3 times a day (01 Apr 2024 18:22)  hydrocodone-acetaminophen 10 mg-325 mg oral tablet: 1 tab(s) orally 3 times a day as needed for  severe pain (01 Apr 2024 18:22)  morphine 15 mg/12 hr oral tablet, extended release: 30 milligram(s) orally 2 times a day (01 Apr 2024 18:23)      Social History:  Tobacco: denies  Alcohol: denies  Drugs: denies      Allergies:  No Known Allergies      Family History:  FAMILY HISTORY:  gastric cancer in mother      Vital Signs in the last 24 hours   Vitals Summary T(C): 37.4 (04-02-24 @ 05:09), Max: 37.4 (04-02-24 @ 05:09)  HR: 77 (04-02-24 @ 05:09) (77 - 98)  BP: 142/68 (04-02-24 @ 05:09) (142/68 - 194/84)  RR: 18 (04-02-24 @ 05:09) (17 - 19)  SpO2: 96% (04-01-24 @ 16:51) (96% - 98%)  Vent Data   Intake/ Output   Measurements       Physical Exam  * General Appearance: Alert, cooperative, interactive, oriented to time, place, and person, in no acute distress  * Lungs: Good bilateral air entry, normal breath sounds  * Heart: Regular Rate and Rhythm, normal S1 and S2, no audible murmur, rub, or gallop  * Abdomen: Symmetric, non-distended, no scar, soft, non-tender, bowel sounds active all four quadrants, no masses  * Rectal: empty vault with black tinge      Investigations   Laboratory Workup      - CBC:                        9.8    14.59 )-----------( 339      ( 01 Apr 2024 12:05 )             27.4       - Hgb Trend:  9.8  04-01-24 @ 12:05        - Chemistry:  04-01    134<L>  |  99  |  18  ----------------------------<  103<H>  3.8   |  21  |  0.7    Ca    9.0      01 Apr 2024 12:05  Mg     1.9     04-01    TPro  6.8  /  Alb  4.4  /  TBili  0.7  /  DBili  x   /  AST  19  /  ALT  14  /  AlkPhos  56  04-01    Liver panel trend:  TBili 0.7   /   AST 19   /   ALT 14   /   AlkP 56   /   Tptn 6.8   /   Alb 4.4    /   DBili --      04-01      - Coagulation Studies:  PT/INR - ( 01 Apr 2024 12:05 )   PT: 12.40 sec;   INR: 1.09 ratio         PTT - ( 01 Apr 2024 12:05 )  PTT:25.3 sec    - ABG:      - Cardiac Markers:  CARDIAC MARKERS ( 01 Apr 2024 12:05 )  x     / x     / 125 U/L / x     / x            Microbiological Workup  Urinalysis Basic - ( 01 Apr 2024 12:05 )    Color: x / Appearance: x / SG: x / pH: x  Gluc: 103 mg/dL / Ketone: x  / Bili: x / Urobili: x   Blood: x / Protein: x / Nitrite: x   Leuk Esterase: x / RBC: x / WBC x   Sq Epi: x / Non Sq Epi: x / Bacteria: x          Radiological Workup  CT Abdomen and Pelvis w/ IV Cont:   ACC: [id number] EXAM:  CT ABDOMEN AND PELVIS IC   ORDERED BY: SURAJ KAMINSKI     ACC: [id number] EXAM:  CT CHEST IC   ORDERED BY: SURAJ KAMINSKI     PROCEDURE DATE:  04/01/2024          INTERPRETATION:  CLINICAL STATEMENT: Fall.    TECHNIQUE: Contiguous axial CT images were obtained from the thoracic   inlet to the pubic symphysis following administration of 100 cc Omnipaque   350 intravenous contrast.  Oral contrast was not administered.    Reformatted images in the coronal, sagittal and MIP planes wereacquired.    Comparison: None available.    FINDINGS:    CHEST:    LUNGS/PLEURA/AIRWAYS: No debris within the tracheobronchial tree. No   pleural effusion, pneumothorax or focal consolidation. Areas of small   atelectasis.    MEDIASTINUM/THORACIC NODES: No mediastinal, hilar or axillary   lymphadenopathy.    HEART/GREAT VESSELS: Normal heart size. No gross pericardial effusion.   Prominent main pulmonary artery measuring 3.1 cm (series 7 image 131).   Coronary artery and thoracic aortic calcification.      ABDOMEN/PELVIS:    01. LIVER: Normal morphology. Likely focal fatty infiltration adjacent to   the falciform ligament.  02. SPLEEN: Normal.  03. PANCREAS: Normal.  04. GALLBLADDER/BILIARY TREE: CBD measures 7 mm (series 8 image 32) which   is within normal limits for age.  Normal gallbladder.  05. ADRENALS: Normal.  06. KIDNEYS: Symmetric enhancement.  No hydronephrosis or renal stone.  07. LYMPHADENOPATHY/RETROPERITONEUM: No lymphadenopathy.  08. BOWEL: Small hiatal hernia. No bowel obstruction. Colonic diverticula.  09. PELVIC VISCERA: Fibroid uterus. Unremarkable urinary bladder.  10. PELVIC LYMPH NODES: No lymphadenopathy.  11. VASCULATURE: No abdominal aortic aneurysm.  12. PERITONEUM/ABDOMINAL WALL: No gross ascites. Small fat-containing   umbilical hernia.  13. SKELETAL: Degenerative changes. Compression deformity of the T7   vertebral body of indeterminate age.      IMPRESSION:    Compression deformity of the T7 vertebral body of indeterminate age.    Prominent main pulmonary artery may seen with pulmonary arterial   hypertension.    --- End of Report ---            TYRESE MALONE MD; Attending Radiologist  This document has been electronically signed. Apr 1 2024  3:15PM (04-01-24 @ 14:41)            Current Medications  Standing Medications  enoxaparin Injectable 40 milliGRAM(s) SubCutaneous every 24 hours  fluticasone propionate 50 MICROgram(s)/spray Nasal Spray 1 Spray(s) Both Nostrils two times a day  gabapentin 300 milliGRAM(s) Oral three times a day  hydrALAZINE 25 milliGRAM(s) Oral once    PRN Medications  acetaminophen     Tablet .. 650 milliGRAM(s) Oral every 6 hours PRN Mild Pain (1 - 3), Moderate Pain (4 - 6)  guaifenesin/dextromethorphan Oral Liquid 10 milliLiter(s) Oral every 8 hours PRN Cough  morphine  IR 15 milliGRAM(s) Oral every 6 hours PRN Severe Pain (7 - 10)    Singles Doses Administered  (ADM OVERRIDE) 1 each &lt;see task&gt; GiveOnce  (ADM OVERRIDE) 1 each &lt;see task&gt; GiveOnce  morphine  - Injectable 2 milliGRAM(s) IV Push Once  morphine  - Injectable 2 milliGRAM(s) IV Push once  traZODone 150 milliGRAM(s) Oral once

## 2024-04-03 ENCOUNTER — APPOINTMENT (OUTPATIENT)
Dept: OTOLARYNGOLOGY | Facility: CLINIC | Age: 74
End: 2024-04-03

## 2024-04-03 LAB
ALBUMIN SERPL ELPH-MCNC: 4.2 G/DL — SIGNIFICANT CHANGE UP (ref 3.5–5.2)
ALBUMIN SERPL ELPH-MCNC: 4.2 G/DL — SIGNIFICANT CHANGE UP (ref 3.5–5.2)
ALP SERPL-CCNC: 52 U/L — SIGNIFICANT CHANGE UP (ref 30–115)
ALP SERPL-CCNC: 55 U/L — SIGNIFICANT CHANGE UP (ref 30–115)
ALT FLD-CCNC: 12 U/L — SIGNIFICANT CHANGE UP (ref 0–41)
ALT FLD-CCNC: 13 U/L — SIGNIFICANT CHANGE UP (ref 0–41)
ANION GAP SERPL CALC-SCNC: 12 MMOL/L — SIGNIFICANT CHANGE UP (ref 7–14)
ANION GAP SERPL CALC-SCNC: 14 MMOL/L — SIGNIFICANT CHANGE UP (ref 7–14)
APTT BLD: 27.3 SEC — SIGNIFICANT CHANGE UP (ref 27–39.2)
AST SERPL-CCNC: 14 U/L — SIGNIFICANT CHANGE UP (ref 0–41)
AST SERPL-CCNC: 15 U/L — SIGNIFICANT CHANGE UP (ref 0–41)
BASOPHILS # BLD AUTO: 0.06 K/UL — SIGNIFICANT CHANGE UP (ref 0–0.2)
BASOPHILS # BLD AUTO: 0.07 K/UL — SIGNIFICANT CHANGE UP (ref 0–0.2)
BASOPHILS NFR BLD AUTO: 0.5 % — SIGNIFICANT CHANGE UP (ref 0–1)
BASOPHILS NFR BLD AUTO: 0.7 % — SIGNIFICANT CHANGE UP (ref 0–1)
BILIRUB SERPL-MCNC: 0.5 MG/DL — SIGNIFICANT CHANGE UP (ref 0.2–1.2)
BILIRUB SERPL-MCNC: 0.6 MG/DL — SIGNIFICANT CHANGE UP (ref 0.2–1.2)
BUN SERPL-MCNC: 12 MG/DL — SIGNIFICANT CHANGE UP (ref 10–20)
BUN SERPL-MCNC: 13 MG/DL — SIGNIFICANT CHANGE UP (ref 10–20)
CALCIUM SERPL-MCNC: 9 MG/DL — SIGNIFICANT CHANGE UP (ref 8.4–10.5)
CALCIUM SERPL-MCNC: 9.3 MG/DL — SIGNIFICANT CHANGE UP (ref 8.4–10.5)
CHLORIDE SERPL-SCNC: 101 MMOL/L — SIGNIFICANT CHANGE UP (ref 98–110)
CHLORIDE SERPL-SCNC: 102 MMOL/L — SIGNIFICANT CHANGE UP (ref 98–110)
CO2 SERPL-SCNC: 21 MMOL/L — SIGNIFICANT CHANGE UP (ref 17–32)
CO2 SERPL-SCNC: 24 MMOL/L — SIGNIFICANT CHANGE UP (ref 17–32)
CREAT SERPL-MCNC: 0.8 MG/DL — SIGNIFICANT CHANGE UP (ref 0.7–1.5)
CREAT SERPL-MCNC: 0.8 MG/DL — SIGNIFICANT CHANGE UP (ref 0.7–1.5)
EGFR: 78 ML/MIN/1.73M2 — SIGNIFICANT CHANGE UP
EGFR: 78 ML/MIN/1.73M2 — SIGNIFICANT CHANGE UP
EOSINOPHIL # BLD AUTO: 0.2 K/UL — SIGNIFICANT CHANGE UP (ref 0–0.7)
EOSINOPHIL # BLD AUTO: 0.22 K/UL — SIGNIFICANT CHANGE UP (ref 0–0.7)
EOSINOPHIL NFR BLD AUTO: 1.8 % — SIGNIFICANT CHANGE UP (ref 0–8)
EOSINOPHIL NFR BLD AUTO: 2.1 % — SIGNIFICANT CHANGE UP (ref 0–8)
GLUCOSE SERPL-MCNC: 100 MG/DL — HIGH (ref 70–99)
GLUCOSE SERPL-MCNC: 97 MG/DL — SIGNIFICANT CHANGE UP (ref 70–99)
HCT VFR BLD CALC: 26 % — LOW (ref 37–47)
HCT VFR BLD CALC: 26.7 % — LOW (ref 37–47)
HGB BLD-MCNC: 9.1 G/DL — LOW (ref 12–16)
HGB BLD-MCNC: 9.3 G/DL — LOW (ref 12–16)
IMM GRANULOCYTES NFR BLD AUTO: 0.3 % — SIGNIFICANT CHANGE UP (ref 0.1–0.3)
IMM GRANULOCYTES NFR BLD AUTO: 0.5 % — HIGH (ref 0.1–0.3)
INR BLD: 1.15 RATIO — SIGNIFICANT CHANGE UP (ref 0.65–1.3)
LYMPHOCYTES # BLD AUTO: 2.59 K/UL — SIGNIFICANT CHANGE UP (ref 1.2–3.4)
LYMPHOCYTES # BLD AUTO: 24.1 % — SIGNIFICANT CHANGE UP (ref 20.5–51.1)
LYMPHOCYTES # BLD AUTO: 29.1 % — SIGNIFICANT CHANGE UP (ref 20.5–51.1)
LYMPHOCYTES # BLD AUTO: 3.32 K/UL — SIGNIFICANT CHANGE UP (ref 1.2–3.4)
MAGNESIUM SERPL-MCNC: 2.1 MG/DL — SIGNIFICANT CHANGE UP (ref 1.8–2.4)
MAGNESIUM SERPL-MCNC: 2.3 MG/DL — SIGNIFICANT CHANGE UP (ref 1.8–2.4)
MCHC RBC-ENTMCNC: 30.9 PG — SIGNIFICANT CHANGE UP (ref 27–31)
MCHC RBC-ENTMCNC: 31 PG — SIGNIFICANT CHANGE UP (ref 27–31)
MCHC RBC-ENTMCNC: 34.8 G/DL — SIGNIFICANT CHANGE UP (ref 32–37)
MCHC RBC-ENTMCNC: 35 G/DL — SIGNIFICANT CHANGE UP (ref 32–37)
MCV RBC AUTO: 88.4 FL — SIGNIFICANT CHANGE UP (ref 81–99)
MCV RBC AUTO: 88.7 FL — SIGNIFICANT CHANGE UP (ref 81–99)
MONOCYTES # BLD AUTO: 0.92 K/UL — HIGH (ref 0.1–0.6)
MONOCYTES # BLD AUTO: 0.95 K/UL — HIGH (ref 0.1–0.6)
MONOCYTES NFR BLD AUTO: 8.3 % — SIGNIFICANT CHANGE UP (ref 1.7–9.3)
MONOCYTES NFR BLD AUTO: 8.6 % — SIGNIFICANT CHANGE UP (ref 1.7–9.3)
NEUTROPHILS # BLD AUTO: 6.85 K/UL — HIGH (ref 1.4–6.5)
NEUTROPHILS # BLD AUTO: 6.88 K/UL — HIGH (ref 1.4–6.5)
NEUTROPHILS NFR BLD AUTO: 60 % — SIGNIFICANT CHANGE UP (ref 42.2–75.2)
NEUTROPHILS NFR BLD AUTO: 64 % — SIGNIFICANT CHANGE UP (ref 42.2–75.2)
NRBC # BLD: 0 /100 WBCS — SIGNIFICANT CHANGE UP (ref 0–0)
NRBC # BLD: 0 /100 WBCS — SIGNIFICANT CHANGE UP (ref 0–0)
PLATELET # BLD AUTO: 342 K/UL — SIGNIFICANT CHANGE UP (ref 130–400)
PLATELET # BLD AUTO: 365 K/UL — SIGNIFICANT CHANGE UP (ref 130–400)
PMV BLD: 9.4 FL — SIGNIFICANT CHANGE UP (ref 7.4–10.4)
PMV BLD: 9.4 FL — SIGNIFICANT CHANGE UP (ref 7.4–10.4)
POTASSIUM SERPL-MCNC: 4.1 MMOL/L — SIGNIFICANT CHANGE UP (ref 3.5–5)
POTASSIUM SERPL-MCNC: 4.3 MMOL/L — SIGNIFICANT CHANGE UP (ref 3.5–5)
POTASSIUM SERPL-SCNC: 4.1 MMOL/L — SIGNIFICANT CHANGE UP (ref 3.5–5)
POTASSIUM SERPL-SCNC: 4.3 MMOL/L — SIGNIFICANT CHANGE UP (ref 3.5–5)
PROT SERPL-MCNC: 6.3 G/DL — SIGNIFICANT CHANGE UP (ref 6–8)
PROT SERPL-MCNC: 6.3 G/DL — SIGNIFICANT CHANGE UP (ref 6–8)
PROTHROM AB SERPL-ACNC: 13.1 SEC — HIGH (ref 9.95–12.87)
RBC # BLD: 2.94 M/UL — LOW (ref 4.2–5.4)
RBC # BLD: 3.01 M/UL — LOW (ref 4.2–5.4)
RBC # FLD: 13.1 % — SIGNIFICANT CHANGE UP (ref 11.5–14.5)
RBC # FLD: 13.1 % — SIGNIFICANT CHANGE UP (ref 11.5–14.5)
SODIUM SERPL-SCNC: 137 MMOL/L — SIGNIFICANT CHANGE UP (ref 135–146)
SODIUM SERPL-SCNC: 137 MMOL/L — SIGNIFICANT CHANGE UP (ref 135–146)
WBC # BLD: 10.73 K/UL — SIGNIFICANT CHANGE UP (ref 4.8–10.8)
WBC # BLD: 11.41 K/UL — HIGH (ref 4.8–10.8)
WBC # FLD AUTO: 10.73 K/UL — SIGNIFICANT CHANGE UP (ref 4.8–10.8)
WBC # FLD AUTO: 11.41 K/UL — HIGH (ref 4.8–10.8)

## 2024-04-03 PROCEDURE — 73610 X-RAY EXAM OF ANKLE: CPT | Mod: 26,LT

## 2024-04-03 PROCEDURE — 95816 EEG AWAKE AND DROWSY: CPT | Mod: 26

## 2024-04-03 PROCEDURE — 99233 SBSQ HOSP IP/OBS HIGH 50: CPT

## 2024-04-03 RX ORDER — ACETAMINOPHEN 500 MG
975 TABLET ORAL EVERY 8 HOURS
Refills: 0 | Status: DISCONTINUED | OUTPATIENT
Start: 2024-04-03 | End: 2024-04-06

## 2024-04-03 RX ORDER — TRAMADOL HYDROCHLORIDE 50 MG/1
50 TABLET ORAL EVERY 6 HOURS
Refills: 0 | Status: DISCONTINUED | OUTPATIENT
Start: 2024-04-03 | End: 2024-04-06

## 2024-04-03 RX ORDER — SODIUM CHLORIDE 9 MG/ML
1000 INJECTION, SOLUTION INTRAVENOUS
Refills: 0 | Status: DISCONTINUED | OUTPATIENT
Start: 2024-04-03 | End: 2024-04-04

## 2024-04-03 RX ORDER — GABAPENTIN 400 MG/1
400 CAPSULE ORAL THREE TIMES A DAY
Refills: 0 | Status: DISCONTINUED | OUTPATIENT
Start: 2024-04-03 | End: 2024-04-06

## 2024-04-03 RX ORDER — SOD SULF/SODIUM/NAHCO3/KCL/PEG
4000 SOLUTION, RECONSTITUTED, ORAL ORAL ONCE
Refills: 0 | Status: COMPLETED | OUTPATIENT
Start: 2024-04-03 | End: 2024-04-03

## 2024-04-03 RX ADMIN — Medication 20 MILLIGRAM(S): at 23:46

## 2024-04-03 RX ADMIN — Medication 4000 MILLILITER(S): at 16:22

## 2024-04-03 RX ADMIN — GABAPENTIN 300 MILLIGRAM(S): 400 CAPSULE ORAL at 05:20

## 2024-04-03 RX ADMIN — Medication 975 MILLIGRAM(S): at 23:00

## 2024-04-03 RX ADMIN — AMLODIPINE BESYLATE 5 MILLIGRAM(S): 2.5 TABLET ORAL at 05:21

## 2024-04-03 RX ADMIN — GABAPENTIN 400 MILLIGRAM(S): 400 CAPSULE ORAL at 13:28

## 2024-04-03 RX ADMIN — TRAMADOL HYDROCHLORIDE 50 MILLIGRAM(S): 50 TABLET ORAL at 13:55

## 2024-04-03 RX ADMIN — PANTOPRAZOLE SODIUM 40 MILLIGRAM(S): 20 TABLET, DELAYED RELEASE ORAL at 05:21

## 2024-04-03 RX ADMIN — MORPHINE SULFATE 15 MILLIGRAM(S): 50 CAPSULE, EXTENDED RELEASE ORAL at 22:38

## 2024-04-03 RX ADMIN — GABAPENTIN 400 MILLIGRAM(S): 400 CAPSULE ORAL at 22:38

## 2024-04-03 RX ADMIN — TRAMADOL HYDROCHLORIDE 50 MILLIGRAM(S): 50 TABLET ORAL at 05:21

## 2024-04-03 RX ADMIN — Medication 975 MILLIGRAM(S): at 13:26

## 2024-04-03 RX ADMIN — SODIUM CHLORIDE 75 MILLILITER(S): 9 INJECTION, SOLUTION INTRAVENOUS at 12:38

## 2024-04-03 RX ADMIN — Medication 975 MILLIGRAM(S): at 22:39

## 2024-04-03 RX ADMIN — Medication 975 MILLIGRAM(S): at 13:35

## 2024-04-03 RX ADMIN — PANTOPRAZOLE SODIUM 40 MILLIGRAM(S): 20 TABLET, DELAYED RELEASE ORAL at 16:23

## 2024-04-03 RX ADMIN — TRAMADOL HYDROCHLORIDE 50 MILLIGRAM(S): 50 TABLET ORAL at 13:25

## 2024-04-03 RX ADMIN — TRAMADOL HYDROCHLORIDE 50 MILLIGRAM(S): 50 TABLET ORAL at 06:46
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
## 2024-04-03 NOTE — PROGRESS NOTE ADULT - SUBJECTIVE AND OBJECTIVE BOX
----------Daily Progress Note----------    HISTORY OF PRESENT ILLNESS:  Patient is a 72 yo F w/PMH of chronic back pain, PTSD, anxiety, and depression who presented to ED after 2 episodes of syncope on the day prior to presentation w/fall w/RLE pain and back pain after the fall. She denies any history of seizure or loss of bladder/bowel control. Of note, patient has URI symptoms that started several weeks ago without improvement on amoxicillin and ciprofloxacin and had diarrhea a few days ago which she treated with imodium. She also reported having black stools for a month. Her ED vitals were notable for hypertensive urgency to  and tachycardia to 98, labs notable for WBC 14.6 and hgb 9.8, trauma work up notable for nondisplaced malleolar fracture at right ankle and compression deformity at T7, and EKG showing NS with PACs. In the ED, her right ankle was splinted. She is now admitted to telemetry for syncope workup.      Today is hospital day 2d.     INTERVAL HOSPITAL COURSE / OVERNIGHT EVENTS:  O/N events:  None    24 hr events:  None    Patient was examined and seen at bedside. Denies any abdominal pain at rest, reports continued pain of RLE and back, reports wanting to try tramadol and go off morphine    Review of Systems: Otherwise unremarkable     <<<<<PAST MEDICAL & SURGICAL HISTORY>>>>>  Chronic back pain    Post traumatic stress disorder (PTSD)    Depression      ALLERGIES  No Known Allergies      Home Medications:  gabapentin 600 mg oral tablet: 1 tab(s) orally 3 times a day (01 Apr 2024 18:22)  hydrocodone-acetaminophen 10 mg-325 mg oral tablet: 1 tab(s) orally 3 times a day as needed for  severe pain (01 Apr 2024 18:22)  morphine 15 mg/12 hr oral tablet, extended release: 30 milligram(s) orally 2 times a day (01 Apr 2024 18:23)        MEDICATIONS  STANDING MEDICATIONS  amLODIPine   Tablet 5 milliGRAM(s) Oral daily  fluticasone propionate 50 MICROgram(s)/spray Nasal Spray 1 Spray(s) Both Nostrils two times a day  gabapentin 300 milliGRAM(s) Oral three times a day  hydrALAZINE 25 milliGRAM(s) Oral once  pantoprazole  Injectable 40 milliGRAM(s) IV Push every 12 hours    PRN MEDICATIONS  acetaminophen     Tablet .. 650 milliGRAM(s) Oral every 6 hours PRN  guaifenesin/dextromethorphan Oral Liquid 10 milliLiter(s) Oral every 8 hours PRN  morphine  IR 15 milliGRAM(s) Oral every 6 hours PRN  traMADol 50 milliGRAM(s) Oral every 8 hours PRN    VITALS:  T(F): 98.8  HR: 73  BP: 164/77  RR: 18  SpO2: --    <<<<<PHYSICAL EXAM>>>>>  GENERAL: NAD  HEENT: Normocephalic, atraumatic, mucous membranes moist, EOMI, PERRLA, bilateral sclera anicteric, has conjunctival pallor  PULMONARY: Clear to auscultation bilaterally. No wheezing or crackles  CARDIOVASCULAR: Regular rate and rhythm, S1-S2, no murmurs, rubs, or gallops  GASTROINTESTINAL: Soft, non-distended, TTP at RUQ, RLQ, and epigastrum, no guarding  BACK: TTP at middle and lower back  SKIN/EXTREMITIES: Warm, 2+ tibialis posterior pulses, no LLE edema, RLE w/ACE wrapping from foot to knee  NEUROLOGIC/MUSCULOSKELETAL: AOx4, grossly moving all extremities, no focal deficits    <<<<<LABS>>>>>                        9.4    12.39 )-----------( 353      ( 02 Apr 2024 07:06 )             26.7     04-02    135  |  101  |  14  ----------------------------<  102<H>  4.1   |  21  |  0.6<L>    Ca    9.2      02 Apr 2024 07:06  Mg     2.1     04-02    TPro  6.6  /  Alb  4.3  /  TBili  0.7  /  DBili  x   /  AST  16  /  ALT  13  /  AlkPhos  54  04-02    PT/INR - ( 01 Apr 2024 12:05 )   PT: 12.40 sec;   INR: 1.09 ratio         PTT - ( 01 Apr 2024 12:05 )  PTT:25.3 sec  Urinalysis Basic - ( 02 Apr 2024 07:06 )    Color: x / Appearance: x / SG: x / pH: x  Gluc: 102 mg/dL / Ketone: x  / Bili: x / Urobili: x   Blood: x / Protein: x / Nitrite: x   Leuk Esterase: x / RBC: x / WBC x   Sq Epi: x / Non Sq Epi: x / Bacteria: x          [id number]  CARDIAC MARKERS ( 01 Apr 2024 12:05 )  x     / x     / 125 U/L / x     / x            <<<<<RADIOLOGY>>>>>      -----------------------------------------------------------------------------------------------------------------------------------------------------------------------------------------------

## 2024-04-03 NOTE — PROGRESS NOTE ADULT - SUBJECTIVE AND OBJECTIVE BOX
Podiatry Progress Note    Subjective:  CHRIS PEREA is a  73y Female.   Seen bedside.   Patient is a 73y old  Female who presents with a chief complaint of fall, syncope (03 Apr 2024 06:52)      Past Medical History and Surgical History  PAST MEDICAL & SURGICAL HISTORY:  Chronic back pain      Post traumatic stress disorder (PTSD)      Depression           Objective:  Vital Signs Last 24 Hrs  T(C): 37.1 (03 Apr 2024 04:49), Max: 37.4 (02 Apr 2024 20:07)  T(F): 98.8 (03 Apr 2024 04:49), Max: 99.4 (02 Apr 2024 20:07)  HR: 73 (03 Apr 2024 04:49) (73 - 79)  BP: 164/77 (03 Apr 2024 04:49) (143/72 - 164/77)  BP(mean): --  RR: 18 (03 Apr 2024 04:49) (18 - 18)  SpO2: --                            9.1    10.73 )-----------( 342      ( 03 Apr 2024 07:27 )             26.0                 04-03    137  |  102  |  13  ----------------------------<  100<H>  4.3   |  21  |  0.8    Ca    9.3      03 Apr 2024 07:27  Mg     2.1     04-03    TPro  6.3  /  Alb  4.2  /  TBili  0.6  /  DBili  x   /  AST  14  /  ALT  12  /  AlkPhos  52  04-03      Physical assessment:   -Mild swelling of bilateral ankles  -No open wounds or lesions, no ecchymosis  -Mild pain to palpation of malleoli bilateral ankle  -Muscle strength and tone WNL      Assessment:  -Lateral malleolar fracture, nondisplaced right ankle    Plan:  Chart reviewed and Patient evaluated. All Questions and Concerns Addressed and Answered  XR Imaging right ankle reviewed; nondisplaced lateral malleolus fracture at ankle  Pending L ankle XR- patient complaining of pain  Weight Bearing Status; nonweightbearing RLE with posterior splint  ACE wrap applied to left ankle  Fracture appears stable - no surgical intervention  Will need Eliquis 2.5mg BID as outpatient  Recommend rehab placement for 1-2 post discharge  Please have PT eval - patient should be able to perform short transfers   Discussed Plan w/ Dr. Crain

## 2024-04-03 NOTE — PROGRESS NOTE ADULT - SUBJECTIVE AND OBJECTIVE BOX
Gastroenterology Follow Up Note      Location: [facility] 3C 027 [address] ([facility] 3C)  Patient Name: CHRIS PEREA  Age: 73y  Gender: Female      Chief Complaint  Patient is a 73y old Female who presents with a chief complaint of fall, syncope (03 Apr 2024 11:58)  Primary diagnosis of Syncope and collapse      Reason for Consult  Melena  Anemia      Progress Note  This morning patient was seen and examined at bedside.    Today is hospital day 2d.  Patient is doing fine. No acute events overnight.   Patient's appetite is adequate, and she is tolerating diet and denies nausea or vomiting.   Patient denies any abdominal pain.  Last bowel movement was soft and was on 04/01.      Vital Signs in the last 24 hours   Vitals Summary T(C): 37.1 (04-03-24 @ 04:49), Max: 37.4 (04-02-24 @ 20:07)  HR: 73 (04-03-24 @ 04:49) (73 - 79)  BP: 164/77 (04-03-24 @ 04:49) (143/72 - 164/77)  RR: 18 (04-03-24 @ 04:49) (18 - 18)  SpO2: --  Vent Data   Intake/ Output   04-02-24 @ 07:01  -  04-03-24 @ 07:00  --------------------------------------------------------  IN: 330 mL / OUT: 0 mL / NET: 330 mL      Physical Exam  * General Appearance: Alert, cooperative, interactive, oriented to time, place, and person, in no acute distress  * Lungs: Good bilateral air entry, normal breath sounds  * Heart: Regular Rate and Rhythm, normal S1 and S2, no audible murmur, rub, or gallop  * Abdomen: Symmetric, non-distended, no scar, soft, non-tender, bowel sounds active all four quadrants, no masses  * Rectal: empty vault with black tinge      Investigations   Laboratory Workup      - CBC:                        9.1    10.73 )-----------( 342      ( 03 Apr 2024 07:27 )             26.0       - Hgb Trend:  9.1  04-03-24 @ 07:27  9.4  04-02-24 @ 07:06  9.8  04-01-24 @ 12:05          - Chemistry:  04-03    137  |  102  |  13  ----------------------------<  100<H>  4.3   |  21  |  0.8    Ca    9.3      03 Apr 2024 07:27  Mg     2.1     04-03    TPro  6.3  /  Alb  4.2  /  TBili  0.6  /  DBili  x   /  AST  14  /  ALT  12  /  AlkPhos  52  04-03    Liver panel trend:  TBili 0.6   /   AST 14   /   ALT 12   /   AlkP 52   /   Tptn 6.3   /   Alb 4.2    /   DBili --      04-03  TBili 0.7   /   AST 16   /   ALT 13   /   AlkP 54   /   Tptn 6.6   /   Alb 4.3    /   DBili --      04-02  TBili 0.7   /   AST 19   /   ALT 14   /   AlkP 56   /   Tptn 6.8   /   Alb 4.4    /   DBili --      04-01        Microbiological Workup  Urinalysis Basic - ( 03 Apr 2024 07:27 )    Color: x / Appearance: x / SG: x / pH: x  Gluc: 100 mg/dL / Ketone: x  / Bili: x / Urobili: x   Blood: x / Protein: x / Nitrite: x   Leuk Esterase: x / RBC: x / WBC x   Sq Epi: x / Non Sq Epi: x / Bacteria: x        Current Medications  Standing Medications  acetaminophen     Tablet .. 975 milliGRAM(s) Oral every 8 hours  amLODIPine   Tablet 5 milliGRAM(s) Oral daily  fluticasone propionate 50 MICROgram(s)/spray Nasal Spray 1 Spray(s) Both Nostrils two times a day  gabapentin 400 milliGRAM(s) Oral three times a day  hydrALAZINE 25 milliGRAM(s) Oral once  lactated ringers. 1000 milliLiter(s) (75 mL/Hr) IV Continuous <Continuous>  pantoprazole  Injectable 40 milliGRAM(s) IV Push every 12 hours    PRN Medications  guaifenesin/dextromethorphan Oral Liquid 10 milliLiter(s) Oral every 8 hours PRN Cough  morphine  IR 15 milliGRAM(s) Oral every 6 hours PRN Severe Pain (7 - 10)  traMADol 50 milliGRAM(s) Oral every 6 hours PRN Moderate Pain (4 - 6)    Singles Doses Administered  (ADM OVERRIDE) 1 each &lt;see task&gt; GiveOnce  (ADM OVERRIDE) 1 each &lt;see task&gt; GiveOnce  (ADM OVERRIDE) 1 each &lt;see task&gt; GiveOnce  morphine  - Injectable 2 milliGRAM(s) IV Push once  morphine  - Injectable 2 milliGRAM(s) IV Push Once  traZODone 150 milliGRAM(s) Oral once

## 2024-04-03 NOTE — PROGRESS NOTE ADULT - ASSESSMENT
Telephone visit during covid outbreak. Pt identified and gives consent. Pt is located.    3 day hx cough productive of green sputum, mild sore throat  and c/o increase in diffuse pain(has dx of fibromyalgia); taking ibuprofen; gabapentin  and tramadol w/o much benefit. 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 States temps at work low. Had covid test yesterday which was negative. Has had nroc before from her rheumatologist.     Noting increased anxiety during present but denies suicidal thoughts    On waiting list for psych f/u     Assess and plan    Viral syndrome exacerbating fibromyalgia;  suggest contact rheumatologist re additional pain rx.    Anxiety;  Await psych f/u; will not prescribe benzo rx in view of previous od    24 minutes spent with pt   Assessment and Plan  Case of a 73 years old female patient known to have a history of anxiety, depression, PTSD, left breast lumpectomy, and recent URTI s/p amoxicillin followed by ciprofloxacin course, who was brought to the ED on 04/01 following 2 episodes of syncope in the setting of recent reported melena, found to be stable with evidence of drop in Hb from 12.8 in 2015 to 9.8 on presentation, admitted for further management. We are consulted for concern of drop in Hb in setting of reported melena.      Reported Melena with Suspected Acute on Chronic Anemia  Rule Out PUD VS AVM VS Esophageal Ulcer VS Erosive Gastritis VS Dieulafoy lesion VS Malignancy   Reported Diarrhea in Setting of Recent Antibiotic Use  Diverticulosis on Imaging  * Presentation: 2 episodes of syncope on 03/31; had recent URTI s/p amoxicillin course in end of February with no improvement and s/p ciprofloxacin until 2 weeks ago; had epigastric burning discomfort in mid March s/p Naproxen for few days; associated with nausea, dry heaves, and change in BMs (had black mushy pasty stools once daily since then until few days ago when BMs became more frequent); she also notes associated weakness and dizziness; noted weight loss from 165 to 149 lb unintentionally over 3 months  * Baseline hemoglobin (prior to admission): 12.8 in 2015  * ED Vitals:  /74 mmHg, HR 98 bpm   * Hemoglobin on admission: Hb 9.8 on 04/01 -> Hb 9.4 on 04/02  * Iron with Total Binding Capacity in AM (04.02.24 @ 07:06) Iron - Total Binding Capacity.: 223 ug/dL; % Saturation, Iron: 14 %; Iron Total: 31 ug/dL; Unsaturated Iron Binding Capacity: 192 ug/dL  * Coags: INR 1.09 on 04/01  * Not on AC or AP  * BRIAN empty vault with black tinge  * CT AP IC 04/01 noted with fatty infiltration along falciform ligament; diverticulosis  * No prior EGD  * Last colonoscopy was 5 years ago by Dr Borja: unremarkable per patient  * Family History: gastric cancer in mother    RECOMMENDATIONS  - Scheduled patient for EGD and colonoscopy on 04/04: TTE and EEG noted: unremarkable.  - Clear liquid diet for now and NPO after midnight  - Please administer golytely 4L today and Dulcolax 20mg at bedtime   - Trend CBC closely BID and transfuse as needed (target Hb >8). Maintain active Type and screen  - Trend BUN; ensure placement of x2 large 18G IV Bores  - Please start pantoprazole 40 IV BID  - Monitor BM for recurrent melena. In case of recurrent diarrhea, would check GI PCR, stool culture, and Clostridium Difficile   - Please avoid any NSAIDs  - If any unstable bleed, please call GI stat        Thank you for your consult.  - Please note that plan was communicated with medical team.   - Please reach GI on [phone] during weekdays till 5pm.  - Please call the GI service line after 5pm on Weekdays and anytime on Weekends: [phone].      Mason Artis MD  PGY - 4 Gastroenterology Fellow   [facility]

## 2024-04-03 NOTE — EEG REPORT - NS EEG TEXT BOX
[facility] Department of Neurology  Inpatient Routine-EEG Report      Patient Name:	CHRIS PEREA    :	1950  MRN:	-  Study Date/Time:	2024, 8:25:04 PM  Referred by:	-    Brief Clinical History:  CHRIS PEREA is a 73 year old Female; study performed to investigate for seizures or markers of epilepsy.   Diagnosis Code: R55 syncope and collapse    Patient Medication:  Ultram    Robitussin    Tylenol    Desyrel    Protonix    Morphine    Neurontin    Flonase    Norvasc    Apresoline      Acquisition Details:  Electroencephalography was acquired using a minimum of 21 channels on an [employer] system v 9.3.1 with electrode placement according to the standard International 10-20 system following ACNS (American Clinical Neurophysiology Society) guidelines.  Anterior temporal T1 and T2 electrodes were utilized whenever possible.   The XLTEK automated spike & seizure detections were all reviewed in detail, in addition to the entire raw EEG.    Findings:  Background:  continuous.   Voltage:  Normal (20uV)  Organization:  Appropriate anterior-posterior gradient  Posterior Dominant Rhythm:  10 Hz symmetric, well-organized, and well-modulated  Variability:  Yes	Reactivity:  Yes  Sleep:  Absent.  Focal abnormalities:  No persistent asymmetries of voltage or frequency.  Interictal Activity:  None  Focal Slowing:  None  Generalized Slowing:  No  Events:  1)	No electrographic seizures or significant clinical events.  Provocations:  1)	Hyperventilation: was not performed.  2)	Photic stimulation: was not performed.  Impression:  Normal REEG    Clinical Correlation:  Normal study does not exclude diagnosis of seizure disorder    Phani Hull MD  Attending Neurologist, Division of Epilepsy

## 2024-04-04 ENCOUNTER — RESULT REVIEW (OUTPATIENT)
Age: 74
End: 2024-04-04

## 2024-04-04 ENCOUNTER — TRANSCRIPTION ENCOUNTER (OUTPATIENT)
Age: 74
End: 2024-04-04

## 2024-04-04 LAB
ALBUMIN SERPL ELPH-MCNC: 4 G/DL — SIGNIFICANT CHANGE UP (ref 3.5–5.2)
ALP SERPL-CCNC: 54 U/L — SIGNIFICANT CHANGE UP (ref 30–115)
ALT FLD-CCNC: 13 U/L — SIGNIFICANT CHANGE UP (ref 0–41)
ANION GAP SERPL CALC-SCNC: 15 MMOL/L — HIGH (ref 7–14)
AST SERPL-CCNC: 13 U/L — SIGNIFICANT CHANGE UP (ref 0–41)
BILIRUB SERPL-MCNC: 0.3 MG/DL — SIGNIFICANT CHANGE UP (ref 0.2–1.2)
BUN SERPL-MCNC: 6 MG/DL — LOW (ref 10–20)
CALCIUM SERPL-MCNC: 9.3 MG/DL — SIGNIFICANT CHANGE UP (ref 8.4–10.4)
CHLORIDE SERPL-SCNC: 102 MMOL/L — SIGNIFICANT CHANGE UP (ref 98–110)
CO2 SERPL-SCNC: 22 MMOL/L — SIGNIFICANT CHANGE UP (ref 17–32)
CREAT SERPL-MCNC: 0.6 MG/DL — LOW (ref 0.7–1.5)
EGFR: 95 ML/MIN/1.73M2 — SIGNIFICANT CHANGE UP
GLUCOSE SERPL-MCNC: 93 MG/DL — SIGNIFICANT CHANGE UP (ref 70–99)
HCT VFR BLD CALC: 26.5 % — LOW (ref 37–47)
HGB BLD-MCNC: 9.5 G/DL — LOW (ref 12–16)
MAGNESIUM SERPL-MCNC: 2 MG/DL — SIGNIFICANT CHANGE UP (ref 1.8–2.4)
MCHC RBC-ENTMCNC: 31.6 PG — HIGH (ref 27–31)
MCHC RBC-ENTMCNC: 35.8 G/DL — SIGNIFICANT CHANGE UP (ref 32–37)
MCV RBC AUTO: 88 FL — SIGNIFICANT CHANGE UP (ref 81–99)
NRBC # BLD: 0 /100 WBCS — SIGNIFICANT CHANGE UP (ref 0–0)
PLATELET # BLD AUTO: 410 K/UL — HIGH (ref 130–400)
PMV BLD: 9.5 FL — SIGNIFICANT CHANGE UP (ref 7.4–10.4)
POTASSIUM SERPL-MCNC: 3.6 MMOL/L — SIGNIFICANT CHANGE UP (ref 3.5–5)
POTASSIUM SERPL-SCNC: 3.6 MMOL/L — SIGNIFICANT CHANGE UP (ref 3.5–5)
PROT SERPL-MCNC: 6.4 G/DL — SIGNIFICANT CHANGE UP (ref 6–8)
RBC # BLD: 3.01 M/UL — LOW (ref 4.2–5.4)
RBC # FLD: 13.1 % — SIGNIFICANT CHANGE UP (ref 11.5–14.5)
SODIUM SERPL-SCNC: 139 MMOL/L — SIGNIFICANT CHANGE UP (ref 135–146)
WBC # BLD: 11.69 K/UL — HIGH (ref 4.8–10.8)
WBC # FLD AUTO: 11.69 K/UL — HIGH (ref 4.8–10.8)

## 2024-04-04 PROCEDURE — 45378 DIAGNOSTIC COLONOSCOPY: CPT

## 2024-04-04 PROCEDURE — 88305 TISSUE EXAM BY PATHOLOGIST: CPT | Mod: 26

## 2024-04-04 PROCEDURE — 88312 SPECIAL STAINS GROUP 1: CPT | Mod: 26

## 2024-04-04 PROCEDURE — 43239 EGD BIOPSY SINGLE/MULTIPLE: CPT

## 2024-04-04 PROCEDURE — 99232 SBSQ HOSP IP/OBS MODERATE 35: CPT

## 2024-04-04 RX ORDER — ZOLPIDEM TARTRATE 10 MG/1
5 TABLET ORAL AT BEDTIME
Refills: 0 | Status: DISCONTINUED | OUTPATIENT
Start: 2024-04-04 | End: 2024-04-06

## 2024-04-04 RX ORDER — CHLORHEXIDINE GLUCONATE 213 G/1000ML
1 SOLUTION TOPICAL
Refills: 0 | Status: DISCONTINUED | OUTPATIENT
Start: 2024-04-04 | End: 2024-04-06

## 2024-04-04 RX ORDER — LISINOPRIL 2.5 MG/1
5 TABLET ORAL AT BEDTIME
Refills: 0 | Status: DISCONTINUED | OUTPATIENT
Start: 2024-04-04 | End: 2024-04-06

## 2024-04-04 RX ORDER — PANTOPRAZOLE SODIUM 20 MG/1
40 TABLET, DELAYED RELEASE ORAL
Refills: 0 | Status: DISCONTINUED | OUTPATIENT
Start: 2024-04-04 | End: 2024-04-06

## 2024-04-04 RX ADMIN — MORPHINE SULFATE 15 MILLIGRAM(S): 50 CAPSULE, EXTENDED RELEASE ORAL at 21:34

## 2024-04-04 RX ADMIN — ZOLPIDEM TARTRATE 5 MILLIGRAM(S): 10 TABLET ORAL at 23:50

## 2024-04-04 RX ADMIN — Medication 975 MILLIGRAM(S): at 13:12

## 2024-04-04 RX ADMIN — GABAPENTIN 400 MILLIGRAM(S): 400 CAPSULE ORAL at 05:06

## 2024-04-04 RX ADMIN — Medication 1 SPRAY(S): at 17:46

## 2024-04-04 RX ADMIN — GABAPENTIN 400 MILLIGRAM(S): 400 CAPSULE ORAL at 13:12

## 2024-04-04 RX ADMIN — AMLODIPINE BESYLATE 5 MILLIGRAM(S): 2.5 TABLET ORAL at 05:01

## 2024-04-04 RX ADMIN — Medication 975 MILLIGRAM(S): at 05:01

## 2024-04-04 RX ADMIN — PANTOPRAZOLE SODIUM 40 MILLIGRAM(S): 20 TABLET, DELAYED RELEASE ORAL at 17:45

## 2024-04-04 RX ADMIN — Medication 1 SPRAY(S): at 05:01

## 2024-04-04 RX ADMIN — Medication 25 MILLIGRAM(S): at 05:01

## 2024-04-04 RX ADMIN — MORPHINE SULFATE 15 MILLIGRAM(S): 50 CAPSULE, EXTENDED RELEASE ORAL at 08:57

## 2024-04-04 RX ADMIN — Medication 975 MILLIGRAM(S): at 22:25

## 2024-04-04 RX ADMIN — Medication 975 MILLIGRAM(S): at 21:34

## 2024-04-04 RX ADMIN — PANTOPRAZOLE SODIUM 40 MILLIGRAM(S): 20 TABLET, DELAYED RELEASE ORAL at 05:00

## 2024-04-04 RX ADMIN — MORPHINE SULFATE 15 MILLIGRAM(S): 50 CAPSULE, EXTENDED RELEASE ORAL at 22:25

## 2024-04-04 RX ADMIN — LISINOPRIL 5 MILLIGRAM(S): 2.5 TABLET ORAL at 23:51

## 2024-04-04 RX ADMIN — MORPHINE SULFATE 15 MILLIGRAM(S): 50 CAPSULE, EXTENDED RELEASE ORAL at 14:59

## 2024-04-04 RX ADMIN — GABAPENTIN 400 MILLIGRAM(S): 400 CAPSULE ORAL at 21:34

## 2024-04-04 NOTE — DIETITIAN INITIAL EVALUATION ADULT - NSFNSGIIOFT_GEN_A_CORE
Ht above obtained from pt   IBW: 54.5kg     04-04-24 @ 07:01  -  04-04-24 @ 11:29  --------------------------------------------------------  OUT:    Stool (mL): 2 mL  Total OUT: 2 mL    Total NET: -2 mL
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
## 2024-04-04 NOTE — DIETITIAN INITIAL EVALUATION ADULT - NS FNS DIET ORDER
Diet, NPO after Midnight:      NPO Start Date: 03-Apr-2024,   NPO Start Time: 23:59  Except Medications (04-03-24 @ 08:13) [Active]  Diet, Clear Liquid (04-02-24 @ 15:23) [Active]

## 2024-04-04 NOTE — PROGRESS NOTE ADULT - SUBJECTIVE AND OBJECTIVE BOX
----------Daily Progress Note----------    HISTORY OF PRESENT ILLNESS:  Patient is a 74 yo F w/PMH of chronic back pain, PTSD, anxiety, and depression who presented to ED after 2 episodes of syncope on the day prior to presentation w/fall w/RLE pain and back pain after the fall. She denies any history of seizure or loss of bladder/bowel control. Of note, patient has URI symptoms that started several weeks ago without improvement on amoxicillin and ciprofloxacin and had diarrhea a few days ago which she treated with imodium. She also reported having black stools for a month. Her ED vitals were notable for hypertensive urgency to  and tachycardia to 98, labs notable for WBC 14.6 and hgb 9.8, trauma work up notable for nondisplaced malleolar fracture at right ankle and compression deformity at T7, and EKG showing NS with PACs. In the ED, her right ankle was splinted. She is now admitted to telemetry for syncope workup.      Today is hospital day 3d.     INTERVAL HOSPITAL COURSE / OVERNIGHT EVENTS:  O/N events:  None    24 hr events:  None    Patient was examined and seen at bedside. Reports abdominal pain in her abdomen, R and L leg, and back, denies any other issues, pending EGD and colonoscopy    Review of Systems: Otherwise unremarkable     <<<<<PAST MEDICAL & SURGICAL HISTORY>>>>>  Chronic back pain    Post traumatic stress disorder (PTSD)    Depression      ALLERGIES  No Known Allergies      Home Medications:  gabapentin 600 mg oral tablet: 1 tab(s) orally 3 times a day (01 Apr 2024 18:22)  hydrocodone-acetaminophen 10 mg-325 mg oral tablet: 1 tab(s) orally 3 times a day as needed for  severe pain (01 Apr 2024 18:22)  morphine 15 mg/12 hr oral tablet, extended release: 30 milligram(s) orally 2 times a day (01 Apr 2024 18:23)        MEDICATIONS  STANDING MEDICATIONS  acetaminophen     Tablet .. 975 milliGRAM(s) Oral every 8 hours  amLODIPine   Tablet 5 milliGRAM(s) Oral daily  bisacodyl 20 milliGRAM(s) Oral at bedtime  fluticasone propionate 50 MICROgram(s)/spray Nasal Spray 1 Spray(s) Both Nostrils two times a day  gabapentin 400 milliGRAM(s) Oral three times a day  lactated ringers. 1000 milliLiter(s) IV Continuous <Continuous>  pantoprazole  Injectable 40 milliGRAM(s) IV Push every 12 hours    PRN MEDICATIONS  guaifenesin/dextromethorphan Oral Liquid 10 milliLiter(s) Oral every 8 hours PRN  morphine  IR 15 milliGRAM(s) Oral every 6 hours PRN  traMADol 50 milliGRAM(s) Oral every 6 hours PRN    VITALS:  T(F): 98.3  HR: 71  BP: 178/79  RR: 18  SpO2: --    <<<<<PHYSICAL EXAM>>>>>  GENERAL: NAD  HEENT: Normocephalic, atraumatic, mucous membranes moist, EOMI, PERRLA, bilateral sclera anicteric, has conjunctival pallor  PULMONARY: Clear to auscultation bilaterally. No wheezing or crackles  CARDIOVASCULAR: Regular rate and rhythm, S1-S2, no murmurs, rubs, or gallops  GASTROINTESTINAL: Soft, non-distended, non-tender, no guarding  SKIN/EXTREMITIES: Warm, 2+ tibialis posterior pulses, LLE w/ace wrapping around lower shin and ankle RLE w/ACE wrapping and splint from foot to knee  NEUROLOGIC/MUSCULOSKELETAL: AOx4, grossly moving all extremities, no focal deficits    <<<<<LABS>>>>>                        9.3    11.41 )-----------( 365      ( 03 Apr 2024 17:05 )             26.7     04-03    137  |  101  |  12  ----------------------------<  97  4.1   |  24  |  0.8    Ca    9.0      03 Apr 2024 17:05  Mg     2.3     04-03    TPro  6.3  /  Alb  4.2  /  TBili  0.5  /  DBili  x   /  AST  15  /  ALT  13  /  AlkPhos  55  04-03    PT/INR - ( 03 Apr 2024 17:05 )   PT: 13.10 sec;   INR: 1.15 ratio         PTT - ( 03 Apr 2024 17:05 )  PTT:27.3 sec  Urinalysis Basic - ( 03 Apr 2024 17:05 )    Color: x / Appearance: x / SG: x / pH: x  Gluc: 97 mg/dL / Ketone: x  / Bili: x / Urobili: x   Blood: x / Protein: x / Nitrite: x   Leuk Esterase: x / RBC: x / WBC x   Sq Epi: x / Non Sq Epi: x / Bacteria: x          [id number]        <<<<<RADIOLOGY>>>>>    <<<<<PHYSICAL EXAM>>>>>  GENERAL: Well developed, well nourished and in no acute distress. Resting comfortably in bed.  HEENT: Normocephalic, atraumatic, mucous membranes moist, EOMI, PERRLA, bilateral sclera anicteric, no conjunctival injection  Neck: Supple, non-tender, no lymphadenopathy.  PULMONARY: Clear to auscultation bilaterally. No rales, rhonchi, or wheezing.  CARDIOVASCULAR: Regular rate and rhythm, S1-S2, no murmurs  GASTROINTESTINAL: Soft, non-tender, non-distended, no guarding.  RENAL: No CVA tenderness.  SKIN/EXTREMITIES: No clubbing or edema  NEUROLOGIC/MUSCULOSKELETAL: AOx4, grossly moving all extremities, no focal deficits.      ----------------------------------------------------------------------------------------------------------------------------------------------------------------------------------------------- ----------Daily Progress Note----------    HISTORY OF PRESENT ILLNESS:  Patient is a 72 yo F w/PMH of chronic back pain, PTSD, anxiety, and depression who presented to ED after 2 episodes of syncope on the day prior to presentation w/fall w/RLE pain and back pain after the fall. She denies any history of seizure or loss of bladder/bowel control. Of note, patient has URI symptoms that started several weeks ago without improvement on amoxicillin and ciprofloxacin and had diarrhea a few days ago which she treated with imodium. She also reported having black stools for a month. Her ED vitals were notable for hypertensive urgency to  and tachycardia to 98, labs notable for WBC 14.6 and hgb 9.8, trauma work up notable for nondisplaced malleolar fracture at right ankle and compression deformity at T7, and EKG showing NS with PACs. In the ED, her right ankle was splinted. She is now admitted to telemetry for syncope workup.      Today is hospital day 3d.     INTERVAL HOSPITAL COURSE / OVERNIGHT EVENTS:  O/N events:  None    24 hr events:  None    Patient was examined and seen at bedside. Reports abdominal pain in her abdomen, R and L leg, and back, denies any other issues, pending EGD and colonoscopy    Review of Systems: Otherwise unremarkable     <<<<<PAST MEDICAL & SURGICAL HISTORY>>>>>  Chronic back pain    Post traumatic stress disorder (PTSD)    Depression      ALLERGIES  No Known Allergies      Home Medications:  gabapentin 600 mg oral tablet: 1 tab(s) orally 3 times a day (01 Apr 2024 18:22)  hydrocodone-acetaminophen 10 mg-325 mg oral tablet: 1 tab(s) orally 3 times a day as needed for  severe pain (01 Apr 2024 18:22)  morphine 15 mg/12 hr oral tablet, extended release: 30 milligram(s) orally 2 times a day (01 Apr 2024 18:23)        MEDICATIONS  STANDING MEDICATIONS  acetaminophen     Tablet .. 975 milliGRAM(s) Oral every 8 hours  amLODIPine   Tablet 5 milliGRAM(s) Oral daily  bisacodyl 20 milliGRAM(s) Oral at bedtime  fluticasone propionate 50 MICROgram(s)/spray Nasal Spray 1 Spray(s) Both Nostrils two times a day  gabapentin 400 milliGRAM(s) Oral three times a day  lactated ringers. 1000 milliLiter(s) IV Continuous <Continuous>  pantoprazole  Injectable 40 milliGRAM(s) IV Push every 12 hours    PRN MEDICATIONS  guaifenesin/dextromethorphan Oral Liquid 10 milliLiter(s) Oral every 8 hours PRN  morphine  IR 15 milliGRAM(s) Oral every 6 hours PRN  traMADol 50 milliGRAM(s) Oral every 6 hours PRN    VITALS:  T(F): 98.3  HR: 71  BP: 178/79  RR: 18  SpO2: --    <<<<<PHYSICAL EXAM>>>>>  GENERAL: NAD  HEENT: Normocephalic, atraumatic, mucous membranes moist, EOMI, PERRLA, bilateral sclera anicteric, has conjunctival pallor  PULMONARY: Clear to auscultation bilaterally. No wheezing or crackles  CARDIOVASCULAR: Regular rate and rhythm, S1-S2, no murmurs, rubs, or gallops  GASTROINTESTINAL: Soft, non-distended, non-tender, no guarding  SKIN/EXTREMITIES: Warm, 2+ tibialis posterior pulses, LLE w/ace wrapping around lower shin and ankle RLE w/ACE wrapping and splint from foot to knee  NEUROLOGIC/MUSCULOSKELETAL: AOx4, grossly moving all extremities, no focal deficits    <<<<<LABS>>>>>                        9.3    11.41 )-----------( 365      ( 03 Apr 2024 17:05 )             26.7     04-03    137  |  101  |  12  ----------------------------<  97  4.1   |  24  |  0.8    Ca    9.0      03 Apr 2024 17:05  Mg     2.3     04-03    TPro  6.3  /  Alb  4.2  /  TBili  0.5  /  DBili  x   /  AST  15  /  ALT  13  /  AlkPhos  55  04-03    PT/INR - ( 03 Apr 2024 17:05 )   PT: 13.10 sec;   INR: 1.15 ratio         PTT - ( 03 Apr 2024 17:05 )  PTT:27.3 sec  Urinalysis Basic - ( 03 Apr 2024 17:05 )    Color: x / Appearance: x / SG: x / pH: x  Gluc: 97 mg/dL / Ketone: x  / Bili: x / Urobili: x   Blood: x / Protein: x / Nitrite: x   Leuk Esterase: x / RBC: x / WBC x   Sq Epi: x / Non Sq Epi: x / Bacteria: x          [id number]        <<<<<RADIOLOGY>>>>>      -----------------------------------------------------------------------------------------------------------------------------------------------------------------------------------------------

## 2024-04-04 NOTE — CONSULT NOTE ADULT - SUBJECTIVE AND OBJECTIVE BOX
HPI:  73-year-old female with a past medical history of chronic back pain, PTSD, anxiety, and depression who presents to the ED for evaluation.  Reports that she syncopized twice yesterday while she was going to her kitchen to drink water.  Endorses pain in her right lower extremity and back.  Denies history of seizure, loss of bladder/bowel control, and pulm lining.  Denies fever, chest pain, shortness breath, nausea, vomiting, abdominal pain, hematuria, dysuria, melena, and hematochezia.    Patient has been sick several weeks ago with URI sx. Still endorses cough. Went to PCP and was prescribed amoxicillin and ciprofloxacin without symptom improvement. Had diarrhea few days ago treated with imodium yesterday.     At the ED, patient was afebrile, hypertensive (SBP max 194), tachycardic (max HR 98), remains on RA. Labs significant for leukocytosis (14,6K), anemia (Hb- 9.8). Trauma imaging workup (+) for nondisplaced malleolar fracture at right ankle and compression deformity at T7. EKG shows NS with PACs    Right ankle was splinted. Neurosurgery saw patient- no acute surgical intervention and follow up outpatient in 4 weeks. Admit to telemetry for syncope workup. HTN has improved with SBP 150s.      < from: Xray Ankle 2 Views, Right (04.01.24 @ 11:36) >  IMPRESSION:    Nondisplaced lateral malleolus fracture.    --- End of Report ---      < from: Xray Ankle Complete 3 Views, Left (04.03.24 @ 13:24) >  impression:    No acute fracture or dislocation. The ankle mortise is symmetric. Small   ankle joint effusion. Mild osteoarthritis of the talonavicular joint.    --- End of Report ---    < end of copied text >    #Syncope possibly due to dehydration 2/2 diarrhea vs acute anemia from suspected GIB, consider arrhythmia vs seizure  - CTH and C Spine negative  - EKG NSR  - Admit to telemetry  - TTE (4/2): LVEF 70-57%  - Pending EEG  - Orthostatics once weight bearing   - Podiatry recs (4/2): Weight Bearing Status; nonweightbearing RLE with posterior splint. Fracture appears stable; likely no surgical intervention indicated  - F/u final podiatry recs  - PT eval recs (4/2): [facility] vs home PT  - GI consult recs (4/2): EGD and colo on 4/4 pending TTE (concern for pulmonary HTN on CT; if pulmonary HTN is severe, would recommend pulmonary risk stratification), start clear liquid diet and keep NPO after midnight, Please check preop labs on night of 4/3, Please administer 4l of golytely during day and Dulcolax 20mg at bedtime, Trend CBC closely BID, active type and screen, start pantoprazole 40 IV BID, In case of recurrent diarrhea, would check GI PCR, stool culture, and Clostridium Difficile  - c/w Protonix 40 mg BID  - Pending EGD and colo 4/4  - Pain management recs (4/2): increase gabapentin to 400mg tid, change tylenol to 975 q8hr, c/w tramadol 50mg q6hr prn, c/w morphine PO 15mg q6hr prn    PAST MEDICAL & SURGICAL HISTORY:  Chronic back pain      Post traumatic stress disorder (PTSD)      Depression          Hospital Course:    TODAY'S SUBJECTIVE & REVIEW OF SYMPTOMS:     Constitutional WNL   Cardio WNL   Resp WNL   GI WNL  Heme WNL  Endo WNL  Skin WNL  MSK right ankle pain   Neuro WNL  Cognitive WNL  Psych WNL      MEDICATIONS  (STANDING):  acetaminophen     Tablet .. 975 milliGRAM(s) Oral every 8 hours  amLODIPine   Tablet 5 milliGRAM(s) Oral daily  bisacodyl 20 milliGRAM(s) Oral at bedtime  chlorhexidine 2% Cloths 1 Application(s) Topical <User Schedule>  fluticasone propionate 50 MICROgram(s)/spray Nasal Spray 1 Spray(s) Both Nostrils two times a day  gabapentin 400 milliGRAM(s) Oral three times a day  lactated ringers. 1000 milliLiter(s) (75 mL/Hr) IV Continuous <Continuous>  pantoprazole    Tablet 40 milliGRAM(s) Oral two times a day    MEDICATIONS  (PRN):  guaifenesin/dextromethorphan Oral Liquid 10 milliLiter(s) Oral every 8 hours PRN Cough  morphine  IR 15 milliGRAM(s) Oral every 6 hours PRN Severe Pain (7 - 10)  traMADol 50 milliGRAM(s) Oral every 6 hours PRN Moderate Pain (4 - 6)  zolpidem 5 milliGRAM(s) Oral at bedtime PRN Insomnia      FAMILY HISTORY:      Allergies    No Known Allergies    Intolerances        SOCIAL HISTORY:    [  ] Etoh  [  ] Smoking  [  ] Substance abuse     Home Environment:  [ x  ] Home Alone  [   ] Lives with Family  [   ] Home Health Aid    Dwelling:  [ x  ] Apartment  [   ] Private House  [   ] Adult Home  [   ] Skilled Nursing Facility      [   ] Short Term  [   ] Long Term  [   ] Stairs       Elevator [  x ]    FUNCTIONAL STATUS PTA: (Check all that apply)  Ambulation: [  x  ]Independent    [   ] Dependent     [   ] Non-Ambulatory  Assistive Device: [   ] SA Cane  [   ]  Q Cane  [   ] Walker  [   ]  Wheelchair  ADL : [x   ] Independent  [    ]  Dependent       Vital Signs Last 24 Hrs  T(C): 36.3 (04 Apr 2024 13:29), Max: 36.8 (04 Apr 2024 05:01)  T(F): 97.4 (04 Apr 2024 13:29), Max: 98.3 (04 Apr 2024 05:01)  HR: 75 (04 Apr 2024 13:29) (61 - 106)  BP: 187/84 (04 Apr 2024 13:29) (98/63 - 216/99)  BP(mean): 112 (04 Apr 2024 11:01) (112 - 112)  RR: 18 (04 Apr 2024 13:29) (18 - 18)  SpO2: 99% (04 Apr 2024 12:51) (96% - 100%)    Parameters below as of 04 Apr 2024 12:51  Patient On (Oxygen Delivery Method): room air          PHYSICAL EXAM: Awake & Alert  GENERAL: NAD  HEAD:  Normocephalic  CHEST/LUNG: Clear   HEART: S1S2+  ABDOMEN: Soft, Nontender  EXTREMITIES:  R ankle in splint / + left ankle tenderness     NERVOUS SYSTEM:  Cranial Nerves 2-12 intact [   ] Abnormal  [   ]  ROM: WFL all extremities [   ]  Abnormal [ x  ]limited RLE  Motor Strength: WFL all extremities  [   ]  Abnormal [ x  ]limited RLE  Sensation: intact to light touch [ x  ] Abnormal [   ]    FUNCTIONAL STATUS:  Bed Mobility: Independent [   ]  Supervision [   ]  Needs Assistance [  x ]  N/A [   ]  Transfers: Independent [   ]  Supervision [   ]  Needs Assistance [   ]  N/A [   ]   Ambulation: Independent [   ]  Supervision [   ]  Needs Assistance [   ]  N/A [   ]  ADL: Independent [   ] Requires Assistance [   ] N/A [   ]      LABS:                        9.5    11.69 )-----------( 410      ( 04 Apr 2024 17:12 )             26.5     04-04    139  |  102  |  6<L>  ----------------------------<  93  3.6   |  22  |  0.6<L>    Ca    9.3      04 Apr 2024 17:12  Mg     2.0     04-04    TPro  6.4  /  Alb  4.0  /  TBili  0.3  /  DBili  x   /  AST  13  /  ALT  13  /  AlkPhos  54  04-04    PT/INR - ( 03 Apr 2024 17:05 )   PT: 13.10 sec;   INR: 1.15 ratio         PTT - ( 03 Apr 2024 17:05 )  PTT:27.3 sec  Urinalysis Basic - ( 04 Apr 2024 17:12 )    Color: x / Appearance: x / SG: x / pH: x  Gluc: 93 mg/dL / Ketone: x  / Bili: x / Urobili: x   Blood: x / Protein: x / Nitrite: x   Leuk Esterase: x / RBC: x / WBC x   Sq Epi: x / Non Sq Epi: x / Bacteria: x        RADIOLOGY & ADDITIONAL STUDIES:

## 2024-04-04 NOTE — DIETITIAN INITIAL EVALUATION ADULT - NUTRITIONGOAL OUTCOME2
Pt to advance to solid diet, consume and tolerate >75% of meals/snacks and PO supplements in 3-5 days.

## 2024-04-04 NOTE — DIETITIAN INITIAL EVALUATION ADULT - ORAL INTAKE PTA/DIET HISTORY
Pt reports good po/appetite PTA, consumes 2-3 meals a day. UBW taken 7 days ago 68.1kg vs. wt at admit taken by RD bedscale: 75.3kg. no wt loss noted. NKFA.

## 2024-04-04 NOTE — DIETITIAN INITIAL EVALUATION ADULT - OTHER INFO
--72 yo F w/PMH presented to ED after 2 episodes of syncope on the day prior to presentation w/fall w/RLE pain and back pain after the fall.   #Syncope possibly due to dehydration 2/2 diarrhea vs acute anemia from suspected GIB, consider arrhythmia vs seizure  - GI consult recs (4/2): EGD and colo on 4/4 pending TTE  --start clear liquid diet and keep NPO after midnight, Please check preop labs on night of 4/3, Please administer 4l of golytely during day and Dulcolax 20mg at bedtime

## 2024-04-04 NOTE — DIETITIAN INITIAL EVALUATION ADULT - PERTINENT LABORATORY DATA
04-03    137  |  101  |  12  ----------------------------<  97  4.1   |  24  |  0.8    Ca    9.0      03 Apr 2024 17:05  Mg     2.3     04-03    TPro  6.3  /  Alb  4.2  /  TBili  0.5  /  DBili  x   /  AST  15  /  ALT  13  /  AlkPhos  55  04-03   Cooperative/Alert/Awake

## 2024-04-04 NOTE — PRE-OP CHECKLIST - BP NONINVASIVE DIASTOLIC (MM HG)
Additional Notes: None concerning for malignancy unless indicated elsewhere in the plan. Monitor.
Detail Level: Generalized
Render Risk Assessment In Note?: yes
74

## 2024-04-04 NOTE — DIETITIAN INITIAL EVALUATION ADULT - OTHER CALCULATIONS
Energy: 1248-1498kcal/day (using MSJ 1-1.2AF)   Protein: 75-90g/day (using 1-1.2g/kg)   Fluid: 1mL/kcal/day

## 2024-04-04 NOTE — DIETITIAN INITIAL EVALUATION ADULT - ADD RECOMMEND
1. Add Ensure Clear 3x/day (660kcal/24g PRO)   2. advance diet per GI reccs   3. encourage and assist with PO intake

## 2024-04-04 NOTE — CONSULT NOTE ADULT - ASSESSMENT
IMPRESSION: Rehab of syncope / right ankle fx, s/p splint / left ankle sprain / melena, pending EGD / colonoscopy  / chronic back pain, PTSD, anxiety, and depression    PRECAUTIONS: [   ] Cardiac  [   ] Respiratory  [   ] Seizures [   ] Contact Isolation  [   ] Droplet Isolation  [   ] Other    Weight Bearing Status: NWB RLE      RECOMMENDATION:    Out of Bed to Chair     DVT/Decubiti Prophylaxis    REHAB PLAN:     [  x  ] Bedside P/T 3-5 times a week   [    ]   Bedside O/T  2-3 times a week             [    ] Speech Therapy               [    ]  No Rehab Therapy Indicated   Conditioning/ROM                                    ADL  Bed Mobility                                               Conditioning/ROM  Transfers                                                     Bed Mobility  Sitting /Standing Balance                         Transfers                                        Gait Training                                               Sitting/Standing Balance  Stair Training [   ]Applicable                    Home equipment Eval                                                                        Splinting  [   ] Only      GOALS:   ADL   [    ]   Independent                    Transfers  [  x  ] Independent                          Ambulation  [  x  ] Independent     [   x  ] With device                            [    ]  CG                                                         [    ]  CG                                                                  [    ] CG                            [    ] Min A                                                   [    ] Min A                                                              [    ] Min  A          DISCHARGE PLAN:   [    ]  Good candidate for Intensive Rehabilitation/Hospital based                                             Will tolerate 3hrs Intensive Rehab Daily                                       [ x    ]  Short Term Rehab in Skilled Nursing Facility                                       [     ]  Home with Outpatient or  services                                         [     ]  Possible Candidate for Intensive Hospital based Rehab

## 2024-04-04 NOTE — DIETITIAN INITIAL EVALUATION ADULT - ENERGY INTAKE
At admit, pt has been on clears and NPO for most of the stay, however, has an appetite and is looking forward to eat.

## 2024-04-04 NOTE — DIETITIAN INITIAL EVALUATION ADULT - ENTERAL
Nutrition Intervention:meals and snacks, medical food supplements   Nutrition Monitoring:diet order,energy intake,body composition,NFPF, GI (BM), lytes, BG

## 2024-04-04 NOTE — CHART NOTE - NSCHARTNOTEFT_GEN_A_CORE
EGD Impressions:  - Normal mucosa in the whole esophagus.  - Multiple clean-based non-bleeding Gratiot Class III Ulcers ranging in size between 7mm-1cm were noted at the level of the antrum. One of the ulcers in the pre-pyloric area was noted with to be pigmented. Multiple cold forceps biopsies were obtained from the margins for histopathology. .  - Erosions in the stomach compatible with erosive gastritis. (Biopsy).  - Normal mucosa in the whole examined duodenum. (Biopsy      Colonoscopy Findings:  Protruding lesions	Small non-bleeding internal hemorrhoids were noted.	  Additional findings	In the setting of poor preparation, small and flat lesions could have been missed.    Plan:	  - Can advance diet as tolerated  - Switch to PO protonix 40mg BID for a total of 8 weeks  - Recommend repeat EGD in 8 weeks to confirm healing of gastric ulcers  - Recommend repeat colonoscopy within 12 months in the setting of poor prep

## 2024-04-04 NOTE — DIETITIAN INITIAL EVALUATION ADULT - PERTINENT MEDS FT
MEDICATIONS  (STANDING):  amLODIPine   Tablet 5 milliGRAM(s) Oral daily  bisacodyl 20 milliGRAM(s) Oral at bedtime  fluticasone propionate 50 MICROgram(s)/spray Nasal Spray 1 Spray(s) Both Nostrils two times a day  gabapentin 400 milliGRAM(s) Oral three times a day  lactated ringers. 1000 milliLiter(s) (75 mL/Hr) IV Continuous <Continuous>  pantoprazole  Injectable 40 milliGRAM(s) IV Push every 12 hours    MEDICATIONS  (PRN):  morphine  IR 15 milliGRAM(s) Oral every 6 hours PRN Severe Pain (7 - 10)  traMADol 50 milliGRAM(s) Oral every 6 hours PRN Moderate Pain (4 - 6)

## 2024-04-05 ENCOUNTER — TRANSCRIPTION ENCOUNTER (OUTPATIENT)
Age: 74
End: 2024-04-05

## 2024-04-05 LAB
HCT VFR BLD CALC: 29.6 % — LOW (ref 37–47)
HGB BLD-MCNC: 9.9 G/DL — LOW (ref 12–16)
MCHC RBC-ENTMCNC: 30.4 PG — SIGNIFICANT CHANGE UP (ref 27–31)
MCHC RBC-ENTMCNC: 33.4 G/DL — SIGNIFICANT CHANGE UP (ref 32–37)
MCV RBC AUTO: 90.8 FL — SIGNIFICANT CHANGE UP (ref 81–99)
NRBC # BLD: 0 /100 WBCS — SIGNIFICANT CHANGE UP (ref 0–0)
PLATELET # BLD AUTO: 417 K/UL — HIGH (ref 130–400)
PMV BLD: 9.5 FL — SIGNIFICANT CHANGE UP (ref 7.4–10.4)
RBC # BLD: 3.26 M/UL — LOW (ref 4.2–5.4)
RBC # FLD: 13.2 % — SIGNIFICANT CHANGE UP (ref 11.5–14.5)
SURGICAL PATHOLOGY STUDY: SIGNIFICANT CHANGE UP
WBC # BLD: 13.34 K/UL — HIGH (ref 4.8–10.8)
WBC # FLD AUTO: 13.34 K/UL — HIGH (ref 4.8–10.8)

## 2024-04-05 PROCEDURE — 99232 SBSQ HOSP IP/OBS MODERATE 35: CPT

## 2024-04-05 PROCEDURE — 99233 SBSQ HOSP IP/OBS HIGH 50: CPT

## 2024-04-05 RX ADMIN — ZOLPIDEM TARTRATE 5 MILLIGRAM(S): 10 TABLET ORAL at 23:53

## 2024-04-05 RX ADMIN — CHLORHEXIDINE GLUCONATE 1 APPLICATION(S): 213 SOLUTION TOPICAL at 05:58

## 2024-04-05 RX ADMIN — Medication 975 MILLIGRAM(S): at 23:57

## 2024-04-05 RX ADMIN — MORPHINE SULFATE 15 MILLIGRAM(S): 50 CAPSULE, EXTENDED RELEASE ORAL at 10:13

## 2024-04-05 RX ADMIN — AMLODIPINE BESYLATE 5 MILLIGRAM(S): 2.5 TABLET ORAL at 05:57

## 2024-04-05 RX ADMIN — Medication 975 MILLIGRAM(S): at 21:36

## 2024-04-05 RX ADMIN — Medication 975 MILLIGRAM(S): at 06:42

## 2024-04-05 RX ADMIN — Medication 975 MILLIGRAM(S): at 13:07

## 2024-04-05 RX ADMIN — MORPHINE SULFATE 15 MILLIGRAM(S): 50 CAPSULE, EXTENDED RELEASE ORAL at 23:52

## 2024-04-05 RX ADMIN — MORPHINE SULFATE 15 MILLIGRAM(S): 50 CAPSULE, EXTENDED RELEASE ORAL at 17:00

## 2024-04-05 RX ADMIN — PANTOPRAZOLE SODIUM 40 MILLIGRAM(S): 20 TABLET, DELAYED RELEASE ORAL at 17:00

## 2024-04-05 RX ADMIN — GABAPENTIN 400 MILLIGRAM(S): 400 CAPSULE ORAL at 06:01

## 2024-04-05 RX ADMIN — LISINOPRIL 5 MILLIGRAM(S): 2.5 TABLET ORAL at 21:36

## 2024-04-05 RX ADMIN — GABAPENTIN 400 MILLIGRAM(S): 400 CAPSULE ORAL at 21:36

## 2024-04-05 RX ADMIN — Medication 975 MILLIGRAM(S): at 05:56

## 2024-04-05 RX ADMIN — PANTOPRAZOLE SODIUM 40 MILLIGRAM(S): 20 TABLET, DELAYED RELEASE ORAL at 05:57

## 2024-04-05 RX ADMIN — GABAPENTIN 400 MILLIGRAM(S): 400 CAPSULE ORAL at 13:07

## 2024-04-05 NOTE — PROGRESS NOTE ADULT - TIME-BASED BILLING (NON-CRITICAL CARE)
ARTIFICIAL TEARS to affected eye(s) as needed.
Time-based billing (NON-critical care)
Time-based billing (NON-critical care)

## 2024-04-05 NOTE — DISCHARGE NOTE PROVIDER - NSDCMRMEDTOKEN_GEN_ALL_CORE_FT
gabapentin 600 mg oral tablet: 1 tab(s) orally 3 times a day  hydrocodone-acetaminophen 10 mg-325 mg oral tablet: 1 tab(s) orally 3 times a day as needed for  severe pain  morphine 15 mg/12 hr oral tablet, extended release: 30 milligram(s) orally 2 times a day   acetaminophen 325 mg oral tablet: 3 tab(s) orally every 8 hours  amLODIPine 5 mg oral tablet: 1 tab(s) orally once a day  gabapentin 600 mg oral tablet: 1 tab(s) orally 3 times a day  lisinopril 5 mg oral tablet: 1 tab(s) orally once a day (at bedtime)  morphine 15 mg/12 hr oral tablet, extended release: 30 milligram(s) orally 2 times a day  pantoprazole 40 mg oral delayed release tablet: 1 tab(s) orally 2 times a day  traMADol 50 mg oral tablet: 1 tab(s) orally every 6 hours As needed Moderate Pain (4 - 6)   acetaminophen 325 mg oral tablet: 3 tab(s) orally every 8 hours  amLODIPine 5 mg oral tablet: 1 tab(s) orally once a day  ferrous sulfate 325 mg (65 mg elemental iron) oral delayed release tablet: 1 tab(s) orally once a day  gabapentin 600 mg oral tablet: 1 tab(s) orally 3 times a day  lisinopril 5 mg oral tablet: 1 tab(s) orally once a day (at bedtime)  morphine 15 mg/12 hr oral tablet, extended release: 30 milligram(s) orally 2 times a day  naloxone 0.4 mg/mL injectable solution: 0.4 milligram(s) intravenously every 3 minutes as needed for opioid overdose If in opioid overdose, administer every 3 minutes until patient&#x27;s mental status recovers  pantoprazole 40 mg oral delayed release tablet: 1 tab(s) orally 2 times a day Take for 8 weeks (Last date 6/1) and follow up with gastroenterologist for further instructions  traMADol 50 mg oral tablet: 1 tab(s) orally every 6 hours As needed Moderate Pain (4 - 6)

## 2024-04-05 NOTE — DISCHARGE NOTE PROVIDER - PROVIDER TOKENS
PROVIDER:[TOKEN:[[id number]],FOLLOWUP:[1 month]],PROVIDER:[TOKEN:[[id number]],FOLLOWUP:[1 week]],PROVIDER:[TOKEN:[[id number]],FOLLOWUP:[1 month]],PROVIDER:[TOKEN:[[id number]],FOLLOWUP:[1 week]]

## 2024-04-05 NOTE — DISCHARGE NOTE PROVIDER - NSDCCPCAREPLAN_GEN_ALL_CORE_FT
PRINCIPAL DISCHARGE DIAGNOSIS  Diagnosis: Syncope  Assessment and Plan of Treatment: You came in after syncopizing twice. Trauma workup found a nondisplaced malleolar fracture of the right ankle and a compression deformity of the T7 vetebrae. You were seen by podiatry, splint placed, and no surgical intervention. Neurosurgery saw you for the T7 vertebrae deformity and no surgical intervention was recommended. You were monitored on telemetry for any arrhythmias and nothing was seen. EEG and Echo showed no seizures and preserved heart function. You had suspected GI bleed so you had a EGD/colonoscopy on 4/4/24 and found multiple ulcers. GI recommended PPIs for 8 weeks and repeat EGD in 8 weeks and colonoscopy in 12 months.   You were seen by PT and recommended for you to discharge to nursing home. You will need to followup with GI, podiatry, neurosurgery and your PCP after discharge.         SECONDARY DISCHARGE DIAGNOSES  Diagnosis: Ankle fracture  Assessment and Plan of Treatment:     Diagnosis: Thoracic spine fracture  Assessment and Plan of Treatment:      PRINCIPAL DISCHARGE DIAGNOSIS  Diagnosis: Syncope  Assessment and Plan of Treatment: You came in after syncopizing twice. Trauma workup found a nondisplaced malleolar fracture of the right ankle and a compression deformity of the T7 vetebrae. You were seen by podiatry, splint placed, and no surgical intervention. Neurosurgery saw you for the T7 vertebrae deformity and no surgical intervention was recommended. You were monitored on telemetry for any arrhythmias and nothing was seen. EEG and Echo showed no seizures and preserved heart function. You had suspected GI bleed so you had a EGD/colonoscopy on 4/4/24 and found multiple ulcers but nothing was bleeding at the time. GI recommended PPIs for 8 weeks and repeat EGD in 8 weeks and colonoscopy in 12 months, the PPIs will help the area heal.  You were seen by PT and recommended for you to discharge to nursing home. You will need to followup with GI, podiatry, neurosurgery and your PCP after discharge. Please get repeat labwork within 4 days including CBC, CMP, magnesium        SECONDARY DISCHARGE DIAGNOSES  Diagnosis: Ankle fracture  Assessment and Plan of Treatment:     Diagnosis: Thoracic spine fracture  Assessment and Plan of Treatment:

## 2024-04-05 NOTE — DISCHARGE NOTE PROVIDER - HOSPITAL COURSE
73-year-old female with a past medical history of chronic back pain, PTSD, anxiety, and depression who presents to the ED for evaluation.  Reports that she syncopized twice yesterday while she was going to her kitchen to drink water.  Endorses pain in her right lower extremity and back.  Denies history of seizure, loss of bladder/bowel control, and pulm lining.  Denies fever, chest pain, shortness breath, nausea, vomiting, abdominal pain, hematuria, dysuria, melena, and hematochezia.    Patient has been sick several weeks ago with URI sx. Still endorses cough. Went to PCP and was prescribed amoxicillin and ciprofloxacin without symptom improvement. Had diarrhea few days ago treated with imodium yesterday.     At the ED, patient was afebrile, hypertensive (SBP max 194), tachycardic (max HR 98), remains on RA. Labs significant for leukocytosis (14,6K), anemia (Hb- 9.8). Trauma imaging workup (+) for nondisplaced malleolar fracture at right ankle and compression deformity at T7. EKG shows NS with PACs    Right ankle was splinted and seen by podiatry. Neurosurgery saw patient- no acute surgical intervention and follow up outpatient in 4 weeks. Admit to telemetry for syncope workup. HTN has improved with SBP 150s.     Pt monitored on tele-no events. TTE and EEG were negative. Syncope like secondary to acute anemia from GI source.   For suspected melena, found to have GREGG and was started on IV venofer. pt s/p EGD/colonoscopy on 4/4, found multiple clean-based non-bleeding ulcers with biopsies taken. GI recommended PO protonix 40mg BID for 8 weeks total, repeat EGD in 8 weeks to confirm healing and repeat colonoscopy within 12 months.     Evaluated by PT and physiatry and recommend discharge to NH.      73-year-old female with a past medical history of chronic back pain, PTSD, anxiety, and depression who presents to the ED for evaluation.  Reports that she syncopized twice yesterday while she was going to her kitchen to drink water.  Endorses pain in her right lower extremity and back.  Denies history of seizure, loss of bladder/bowel control, and pulm lining.  Denies fever, chest pain, shortness breath, nausea, vomiting, abdominal pain, hematuria, dysuria, melena, and hematochezia.    Patient has been sick several weeks ago with URI sx. Still endorses cough. Went to PCP and was prescribed amoxicillin and ciprofloxacin without symptom improvement. Had diarrhea few days ago treated with imodium yesterday.     At the ED, patient was afebrile, hypertensive (SBP max 194), tachycardic (max HR 98), remains on RA. Labs significant for leukocytosis (14,6K), anemia (Hb- 9.8). Trauma imaging workup (+) for nondisplaced malleolar fracture at right ankle and compression deformity at T7. EKG shows NS with PACs    Right ankle was splinted and seen by podiatry. Neurosurgery saw patient- no acute surgical intervention and follow up outpatient in 4 weeks. Admit to telemetry for syncope workup. HTN has improved with SBP 150s. Will see podiatry as oputpatient.    Pt monitored on tele-no events. TTE and EEG were negative. Syncope like secondary to acute anemia from GI source.   . pt s/p EGD/colonoscopy on 4/4, found multiple clean-based non-bleeding ulcers with biopsies taken. GI recommended PO protonix 40mg BID for 8 weeks total, repeat EGD in 8 weeks to confirm healing and repeat colonoscopy within 12 months.     Evaluated by PT and physiatry and recommend discharge to NH.
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
## 2024-04-05 NOTE — DISCHARGE NOTE PROVIDER - CARE PROVIDERS DIRECT ADDRESSES
[email or web address]

## 2024-04-05 NOTE — DISCHARGE NOTE PROVIDER - NSDCFUSCHEDAPPT_GEN_ALL_CORE_FT
Talat Mark Physician [facility]  NEUROSURG [address] [facility]  Scheduled Appointment: 04/30/2024

## 2024-04-05 NOTE — DISCHARGE NOTE PROVIDER - CARE PROVIDER_API CALL
Ivon Goodman  Gastroenterology  [address] [place] [address]  Phone: [phone]  Fax: [phone]  Follow Up Time: 1 month    Nahun Crain  Podiatric Medicine  [address] [place] [address]  Phone: [phone]  Fax: [phone]  Follow Up Time: 1 week    Talat Mark  [address]  [place] [address]  Phone: [phone]  Fax: [phone]  Follow Up Time: 1 month    Broderick Todd  NP in Family Health  [address] [place] [id number]  Phone: [phone]  Fax: [phone]  Follow Up Time: 1 week

## 2024-04-05 NOTE — PROGRESS NOTE ADULT - SUBJECTIVE AND OBJECTIVE BOX
Gastroenterology Follow Up Note      Location: [facility] 3C 0[address] ([facility] 3C)  Patient Name: CHRIS PEREA  Age: 73y  Gender: Female      Chief Complaint  Patient is a 73y old Female who presents with a chief complaint of fall, syncope (05 Apr 2024 08:20)  Primary diagnosis of Syncope and collapse        Reason for Consult  Melena      Progress Note  This morning patient was seen and examined at bedside.    Today is hospital day 4d.  Patient is doing fine.   Patient's appetite is adequate, and she is tolerating diet and denies nausea or vomiting.   Patient denies any abdominal pain, diarrhea, constipation, melena, hematochezia, or hematemesis.  Last bowel movement was soft and was on 04/04 (clear).        Vital Signs in the last 24 hours   Vitals Summary T(C): 36.4 (04-05-24 @ 05:31), Max: 37.1 (04-04-24 @ 20:25)  HR: 72 (04-05-24 @ 05:31) (71 - 106)  BP: 143/73 (04-05-24 @ 05:31) (98/63 - 187/84)  RR: 18 (04-05-24 @ 05:31) (18 - 18)  SpO2: 99% (04-04-24 @ 12:51) (96% - 100%)  Vent Data   Intake/ Output   04-04-24 @ 07:01  -  04-05-24 @ 07:00  --------------------------------------------------------  IN: 740 mL / OUT: 2052 mL / NET: -1312 mL      Measurements Height (cm): 162.6 (04-04-24 @ 11:29)      Physical Exam  * General Appearance: Alert, cooperative, interactive, oriented to time, place, and person, in no acute distress  * Lungs: Good bilateral air entry, normal breath sounds  * Heart: Regular Rate and Rhythm, normal S1 and S2, no audible murmur, rub, or gallop  * Abdomen: Symmetric, non-distended, no scar, soft, non-tender, bowel sounds active all four quadrants, no masses  * Rectal: empty vault with black tinge        Investigations   Laboratory Workup      - CBC:                        9.9    13.34 )-----------( 417      ( 05 Apr 2024 07:06 )             29.6       - Hgb Trend:  9.9  04-05-24 @ 07:06  9.5  04-04-24 @ 17:12  9.3  04-03-24 @ 17:05  9.1  04-03-24 @ 07:27          - Chemistry:  04-04    139  |  102  |  6<L>  ----------------------------<  93  3.6   |  22  |  0.6<L>    Ca    9.3      04 Apr 2024 17:12  Mg     2.0     04-04    TPro  6.4  /  Alb  4.0  /  TBili  0.3  /  DBili  x   /  AST  13  /  ALT  13  /  AlkPhos  54  04-04    Liver panel trend:  TBili 0.3   /   AST 13   /   ALT 13   /   AlkP 54   /   Tptn 6.4   /   Alb 4.0    /   DBili --      04-04  TBili 0.5   /   AST 15   /   ALT 13   /   AlkP 55   /   Tptn 6.3   /   Alb 4.2    /   DBili --      04-03  TBili 0.6   /   AST 14   /   ALT 12   /   AlkP 52   /   Tptn 6.3   /   Alb 4.2    /   DBili --      04-03  TBili 0.7   /   AST 16   /   ALT 13   /   AlkP 54   /   Tptn 6.6   /   Alb 4.3    /   DBili --      04-02  TBili 0.7   /   AST 19   /   ALT 14   /   AlkP 56   /   Tptn 6.8   /   Alb 4.4    /   DBili --      04-01      - Coagulation Studies:  PT/INR - ( 03 Apr 2024 17:05 )   PT: 13.10 sec;   INR: 1.15 ratio         PTT - ( 03 Apr 2024 17:05 )  PTT:27.3 sec    - ABG:      - Cardiac Markers:        Microbiological Workup  Urinalysis Basic - ( 04 Apr 2024 17:12 )    Color: x / Appearance: x / SG: x / pH: x  Gluc: 93 mg/dL / Ketone: x  / Bili: x / Urobili: x   Blood: x / Protein: x / Nitrite: x   Leuk Esterase: x / RBC: x / WBC x   Sq Epi: x / Non Sq Epi: x / Bacteria: x          Current Medications  Standing Medications  acetaminophen     Tablet .. 975 milliGRAM(s) Oral every 8 hours  amLODIPine   Tablet 5 milliGRAM(s) Oral daily  bisacodyl 20 milliGRAM(s) Oral at bedtime  chlorhexidine 2% Cloths 1 Application(s) Topical <User Schedule>  fluticasone propionate 50 MICROgram(s)/spray Nasal Spray 1 Spray(s) Both Nostrils two times a day  gabapentin 400 milliGRAM(s) Oral three times a day  lisinopril 5 milliGRAM(s) Oral at bedtime  pantoprazole    Tablet 40 milliGRAM(s) Oral two times a day    PRN Medications  guaifenesin/dextromethorphan Oral Liquid 10 milliLiter(s) Oral every 8 hours PRN Cough  morphine  IR 15 milliGRAM(s) Oral every 6 hours PRN Severe Pain (7 - 10)  traMADol 50 milliGRAM(s) Oral every 6 hours PRN Moderate Pain (4 - 6)  zolpidem 5 milliGRAM(s) Oral at bedtime PRN Insomnia    Singles Doses Administered  (ADM OVERRIDE) 1 each &lt;see task&gt; GiveOnce  (ADM OVERRIDE) 1 each &lt;see task&gt; GiveOnce  (ADM OVERRIDE) 2 each &lt;see task&gt; GiveOnce  (ADM OVERRIDE) 1 each &lt;see task&gt; GiveOnce  bisacodyl 20 milliGRAM(s) Oral at bedtime  hydrALAZINE 25 milliGRAM(s) Oral once  morphine  - Injectable 2 milliGRAM(s) IV Push once  morphine  - Injectable 2 milliGRAM(s) IV Push Once  polyethylene glycol/electrolyte Solution. 4000 milliLiter(s) Oral once  traZODone 150 milliGRAM(s) Oral once

## 2024-04-05 NOTE — PROGRESS NOTE ADULT - SUBJECTIVE AND OBJECTIVE BOX
----------Daily Progress Note----------    HISTORY OF PRESENT ILLNESS:  Patient is a 72 yo F w/PMH of chronic back pain, PTSD, anxiety, and depression who presented to ED after 2 episodes of syncope on the day prior to presentation w/fall w/RLE pain and back pain after the fall. She denies any history of seizure or loss of bladder/bowel control. Of note, patient has URI symptoms that started several weeks ago without improvement on amoxicillin and ciprofloxacin and had diarrhea a few days ago which she treated with imodium. She also reported having black stools for a month. Her ED vitals were notable for hypertensive urgency to  and tachycardia to 98, labs notable for WBC 14.6 and hgb 9.8, trauma work up notable for nondisplaced malleolar fracture at right ankle and compression deformity at T7, and EKG showing NS with PACs. In the ED, her right ankle was splinted. She is now admitted to telemetry for syncope workup.      Today is hospital day 3d.     INTERVAL HOSPITAL COURSE / OVERNIGHT EVENTS:  O/N events:  None    24 hr events:  None    Patient was examined and seen at bedside. Reports abdominal pain in her abdomen, R and L leg, and back, denies any other issues, pending EGD and colonoscopy    Review of Systems: Otherwise unremarkable       <<<<<PAST MEDICAL & SURGICAL HISTORY>>>>>  Chronic back pain    Post traumatic stress disorder (PTSD)    Depression      ALLERGIES  No Known Allergies      Home Medications:  gabapentin 600 mg oral tablet: 1 tab(s) orally 3 times a day (01 Apr 2024 18:22)  hydrocodone-acetaminophen 10 mg-325 mg oral tablet: 1 tab(s) orally 3 times a day as needed for  severe pain (01 Apr 2024 18:22)  morphine 15 mg/12 hr oral tablet, extended release: 30 milligram(s) orally 2 times a day (01 Apr 2024 18:23)        MEDICATIONS  STANDING MEDICATIONS  acetaminophen     Tablet .. 975 milliGRAM(s) Oral every 8 hours  amLODIPine   Tablet 5 milliGRAM(s) Oral daily  bisacodyl 20 milliGRAM(s) Oral at bedtime  chlorhexidine 2% Cloths 1 Application(s) Topical <User Schedule>  fluticasone propionate 50 MICROgram(s)/spray Nasal Spray 1 Spray(s) Both Nostrils two times a day  gabapentin 400 milliGRAM(s) Oral three times a day  lisinopril 5 milliGRAM(s) Oral at bedtime  pantoprazole    Tablet 40 milliGRAM(s) Oral two times a day    PRN MEDICATIONS  guaifenesin/dextromethorphan Oral Liquid 10 milliLiter(s) Oral every 8 hours PRN  morphine  IR 15 milliGRAM(s) Oral every 6 hours PRN  traMADol 50 milliGRAM(s) Oral every 6 hours PRN  zolpidem 5 milliGRAM(s) Oral at bedtime PRN    VITALS:  T(F): 97.5  HR: 72  BP: 143/73  RR: 18  SpO2: 99%    <<<<<PHYSICAL EXAM>>>>>  GENERAL: NAD  HEENT: Normocephalic, atraumatic, mucous membranes moist, EOMI, PERRLA, bilateral sclera anicteric, has conjunctival pallor  PULMONARY: Clear to auscultation bilaterally. No wheezing or crackles  CARDIOVASCULAR: Regular rate and rhythm, S1-S2, no murmurs, rubs, or gallops  GASTROINTESTINAL: Soft, non-distended, non-tender, no guarding  SKIN/EXTREMITIES: Warm, 2+ tibialis posterior pulses, LLE w/ace wrapping around lower shin and ankle RLE w/ACE wrapping and splint from foot to knee  NEUROLOGIC/MUSCULOSKELETAL: AOx4, grossly moving all extremities, no focal deficits    <<<<<LABS>>>>>                        9.5    11.69 )-----------( 410      ( 04 Apr 2024 17:12 )             26.5     04-04    139  |  102  |  6<L>  ----------------------------<  93  3.6   |  22  |  0.6<L>    Ca    9.3      04 Apr 2024 17:12  Mg     2.0     04-04    TPro  6.4  /  Alb  4.0  /  TBili  0.3  /  DBili  x   /  AST  13  /  ALT  13  /  AlkPhos  54  04-04    PT/INR - ( 03 Apr 2024 17:05 )   PT: 13.10 sec;   INR: 1.15 ratio         PTT - ( 03 Apr 2024 17:05 )  PTT:27.3 sec  Urinalysis Basic - ( 04 Apr 2024 17:12 )    Color: x / Appearance: x / SG: x / pH: x  Gluc: 93 mg/dL / Ketone: x  / Bili: x / Urobili: x   Blood: x / Protein: x / Nitrite: x   Leuk Esterase: x / RBC: x / WBC x   Sq Epi: x / Non Sq Epi: x / Bacteria: x          [id number]        <<<<<RADIOLOGY>>>>>        ----------------------------------------------------------------------------------------------------------------------------------------------------------------------------------------------- ----------Daily Progress Note----------    HISTORY OF PRESENT ILLNESS:  Patient is a 74 yo F w/PMH of chronic back pain, PTSD, anxiety, and depression who presented to ED after 2 episodes of syncope on the day prior to presentation w/fall w/RLE pain and back pain after the fall. She denies any history of seizure or loss of bladder/bowel control. Of note, patient has URI symptoms that started several weeks ago without improvement on amoxicillin and ciprofloxacin and had diarrhea a few days ago which she treated with imodium. She also reported having black stools for a month. Her ED vitals were notable for hypertensive urgency to  and tachycardia to 98, labs notable for WBC 14.6 and hgb 9.8, trauma work up notable for nondisplaced malleolar fracture at right ankle and compression deformity at T7, and EKG showing NS with PACs. In the ED, her right ankle was splinted. She is now admitted to telemetry for syncope workup.      Today is hospital day 3d.     INTERVAL HOSPITAL COURSE / OVERNIGHT EVENTS:  O/N events:  None    24 hr events:  None    Patient was examined and seen at bedside. Reports pain being well controlled, denies any more abdominal pain, endorses improved appetite, denies any other issues    Review of Systems: Otherwise unremarkable     <<<<<PAST MEDICAL & SURGICAL HISTORY>>>>>  Chronic back pain    Post traumatic stress disorder (PTSD)    Depression      ALLERGIES  No Known Allergies      Home Medications:  gabapentin 600 mg oral tablet: 1 tab(s) orally 3 times a day (01 Apr 2024 18:22)  hydrocodone-acetaminophen 10 mg-325 mg oral tablet: 1 tab(s) orally 3 times a day as needed for  severe pain (01 Apr 2024 18:22)  morphine 15 mg/12 hr oral tablet, extended release: 30 milligram(s) orally 2 times a day (01 Apr 2024 18:23)        MEDICATIONS  STANDING MEDICATIONS  acetaminophen     Tablet .. 975 milliGRAM(s) Oral every 8 hours  amLODIPine   Tablet 5 milliGRAM(s) Oral daily  bisacodyl 20 milliGRAM(s) Oral at bedtime  chlorhexidine 2% Cloths 1 Application(s) Topical <User Schedule>  fluticasone propionate 50 MICROgram(s)/spray Nasal Spray 1 Spray(s) Both Nostrils two times a day  gabapentin 400 milliGRAM(s) Oral three times a day  lisinopril 5 milliGRAM(s) Oral at bedtime  pantoprazole    Tablet 40 milliGRAM(s) Oral two times a day    PRN MEDICATIONS  guaifenesin/dextromethorphan Oral Liquid 10 milliLiter(s) Oral every 8 hours PRN  morphine  IR 15 milliGRAM(s) Oral every 6 hours PRN  traMADol 50 milliGRAM(s) Oral every 6 hours PRN  zolpidem 5 milliGRAM(s) Oral at bedtime PRN    VITALS:  T(F): 97.5  HR: 72  BP: 143/73  RR: 18  SpO2: 99%    <<<<<PHYSICAL EXAM>>>>>  GENERAL: NAD  HEENT: Normocephalic, atraumatic, mucous membranes moist, EOMI, PERRLA, bilateral sclera anicteric, has conjunctival pallor  PULMONARY: Clear to auscultation bilaterally. No wheezing or crackles  CARDIOVASCULAR: Regular rate and rhythm, S1-S2, no murmurs, rubs, or gallops  GASTROINTESTINAL: Soft, non-distended, non-tender, no guarding  SKIN/EXTREMITIES: Warm, 2+ tibialis posterior pulses, LLE w/ace wrapping around lower shin and ankle RLE w/ACE wrapping and splint from foot to knee  NEUROLOGIC/MUSCULOSKELETAL: AOx4, grossly moving all extremities, no focal deficits    <<<<<LABS>>>>>                        9.5    11.69 )-----------( 410      ( 04 Apr 2024 17:12 )             26.5     04-04    139  |  102  |  6<L>  ----------------------------<  93  3.6   |  22  |  0.6<L>    Ca    9.3      04 Apr 2024 17:12  Mg     2.0     04-04    TPro  6.4  /  Alb  4.0  /  TBili  0.3  /  DBili  x   /  AST  13  /  ALT  13  /  AlkPhos  54  04-04    PT/INR - ( 03 Apr 2024 17:05 )   PT: 13.10 sec;   INR: 1.15 ratio         PTT - ( 03 Apr 2024 17:05 )  PTT:27.3 sec  Urinalysis Basic - ( 04 Apr 2024 17:12 )    Color: x / Appearance: x / SG: x / pH: x  Gluc: 93 mg/dL / Ketone: x  / Bili: x / Urobili: x   Blood: x / Protein: x / Nitrite: x   Leuk Esterase: x / RBC: x / WBC x   Sq Epi: x / Non Sq Epi: x / Bacteria: x          [id number]        <<<<<RADIOLOGY>>>>>        -----------------------------------------------------------------------------------------------------------------------------------------------------------------------------------------------

## 2024-04-05 NOTE — DISCHARGE NOTE PROVIDER - NSDCFUADDAPPT_GEN_ALL_CORE_FT
APPTS ARE READY TO BE MADE: [ x] YES    Best Family or Patient Contact (if needed): daughter    Additional Information about above appointments (if needed):    1: podiatry  2: PCP  3: neurosurgery    Other comments or requests:

## 2024-04-05 NOTE — PROGRESS NOTE ADULT - ASSESSMENT
Assessment and Plan  Case of a 73 years old female patient known to have a history of anxiety, depression, PTSD, left breast lumpectomy, and recent URTI s/p amoxicillin followed by ciprofloxacin course, who was brought to the ED on 04/01 following 2 episodes of syncope in the setting of recent reported melena, found to be stable with evidence of drop in Hb from 12.8 in 2015 to 9.8 on presentation, admitted for further management. We are consulted for concern of drop in Hb in setting of reported melena.      Reported Melena with Suspected Acute on Chronic Anemia  Rule Out PUD VS AVM VS Esophageal Ulcer VS Erosive Gastritis VS Dieulafoy lesion VS Malignancy   Reported Diarrhea in Setting of Recent Antibiotic Use  Diverticulosis on Imaging  * Presentation: 2 episodes of syncope on 03/31; had recent URTI s/p amoxicillin course in end of February with no improvement and s/p ciprofloxacin until 2 weeks ago; had epigastric burning discomfort in mid March s/p Naproxen for few days; associated with nausea, dry heaves, and change in BMs (had black mushy pasty stools once daily since then until few days ago when BMs became more frequent); she also notes associated weakness and dizziness; noted weight loss from 165 to 149 lb unintentionally over 3 months  * Baseline hemoglobin (prior to admission): 12.8 in 2015  * ED Vitals:  /74 mmHg, HR 98 bpm   * Hemoglobin on admission: Hb 9.8 on 04/01 -> Hb 9.4 on 04/02  * Iron with Total Binding Capacity in AM (04.02.24 @ 07:06) Iron - Total Binding Capacity.: 223 ug/dL; % Saturation, Iron: 14 %; Iron Total: 31 ug/dL; Unsaturated Iron Binding Capacity: 192 ug/dL  * Coags: INR 1.09 on 04/01  * Not on AC or AP  * BRIAN empty vault with black tinge  * CT AP IC 04/01 noted with fatty infiltration along falciform ligament; diverticulosis  * No prior EGD  * Last colonoscopy was 5 years ago by Dr Borja: unremarkable per patient  * Family History: gastric cancer in mother    RECOMMENDATIONS  - Scheduled patient for EGD and colonoscopy on 04/04: TTE and EEG noted: unremarkable.  - Clear liquid diet for now and NPO after midnight  - Please administer golytely 4L today and Dulcolax 20mg at bedtime   - Trend CBC closely BID and transfuse as needed (target Hb >8). Maintain active Type and screen  - Trend BUN; ensure placement of x2 large 18G IV Bores  - Please start pantoprazole 40 IV BID  - Monitor BM for recurrent melena. In case of recurrent diarrhea, would check GI PCR, stool culture, and Clostridium Difficile   - Please avoid any NSAIDs  - If any unstable bleed, please call GI stat        Thank you for your consult.  - Please note that plan was communicated with medical team.   - Please reach GI on [phone] during weekdays till 5pm.  - Please call the GI service line after 5pm on Weekdays and anytime on Weekends: [phone].      Mason Artis MD  PGY - 4 Gastroenterology Fellow   [facility]     Assessment and Plan  Case of a 73 years old female patient known to have a history of anxiety, depression, PTSD, left breast lumpectomy, and recent URTI s/p amoxicillin followed by ciprofloxacin course, who was brought to the ED on 04/01 following 2 episodes of syncope in the setting of recent reported melena, found to be stable with evidence of drop in Hb from 12.8 in 2015 to 9.8 on presentation, admitted for further management. We are consulted for concern of drop in Hb in setting of reported melena.      Reported Melena with Suspected Acute on Chronic Anemia  Rule Out PUD VS AVM VS Esophageal Ulcer VS Erosive Gastritis VS Dieulafoy lesion VS Malignancy   Reported Diarrhea in Setting of Recent Antibiotic Use  Diverticulosis on Imaging  * Hemodynamically stable; no rectal bleeding   * Hemoglobin trend: Hb 9.8 on 04/01 -> Hb 9.4 on 04/02 -> Hb 9.5 on 04/04  * Iron with Total Binding Capacity in AM (04.02.24 @ 07:06) Iron - Total Binding Capacity.: 223 ug/dL; % Saturation, Iron: 14 %; Iron Total: 31 ug/dL; Unsaturated Iron Binding Capacity: 192 ug/dL  * BRIAN empty vault with black tinge  * CT AP IC 04/01 noted with fatty infiltration along falciform ligament; diverticulosis  * Status post EGD on 04/04 Multiple clean-based non-bleeding Vista Class III Ulcers ranging in size between 7mm-1cm were noted at the level of the antrum. One of the ulcers in the pre-pyloric area was noted with to be pigmented Erosions in the stomach compatible with erosive gastritis. (Biopsy). Normal mucosa in the whole examined duodenum. (Biopsy  * Status post Colonoscopy on 04/04 Small non-bleeding internal hemorrhoids were noted.	In the setting of poor preparation, small and flat lesions could have been missed.    RECOMMENDATIONS  - Can advance diet as tolerated  - Continue PO protonix 40mg BID for a total of 8 weeks  - Recommend repeat EGD in 8 weeks to confirm healing of gastric ulcers  - Recommend repeat colonoscopy within 12 months in the setting of poor prep.  - Trend CBC closely BID and transfuse as needed (target Hb >8). Maintain active Type and screen  - Monitor BM for recurrent melena   - Please avoid any NSAIDs  - If any unstable bleed, please call GI stat        Thank you for your consult.  - Please note that plan was communicated with medical team.   - Please reach GI on [phone] during weekdays till 5pm.  - Please call the GI service line after 5pm on Weekdays and anytime on Weekends: [phone].      Mason Artis MD  PGY - 4 Gastroenterology Fellow   [facility]
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
## 2024-04-05 NOTE — DISCHARGE NOTE PROVIDER - DISCHARGE DIET
Pt is s/p LAR converted into an APR for rectal ca, now POD 16, presenting from his rehabilitation facility because of concern for a nonviable ostomy.  +h/o DVT. CXR 12/23: heart appears to be slightly enlarged,  RML pna and/or atelectasis, left lung appears to be clear. CTA 12/23: Acute pulmonary embolism. Pt had VAC dressing on abdominal wound last hospital admission which was removed for transfer to rehab center, but never reapplied at rehab center. Soft and Bite-Sized Diet

## 2024-04-05 NOTE — DISCHARGE NOTE PROVIDER - ATTENDING DISCHARGE PHYSICAL EXAMINATION:
General: WN/WD NAD  Neurology: A&Ox3, nonfocal, GAUTHIER x 4  Head:  Normocephalic, atraumatic  ENT:  Mucosa moist, no ulcerations  Neck:  Supple, no sinuses or palpable masses  Lymphatic:  No palpable cervical, supraclavicular, axillary or inguinal adenopathy  Respiratory: CTA B/L  CV: RRR, S1S2, no murmur  Abdominal: Soft, NT, ND no palpable mass  MSK: right foot in splint  Incisions: intact, no erythema or drainage   Stable

## 2024-04-06 VITALS
TEMPERATURE: 99 F | RESPIRATION RATE: 18 BRPM | DIASTOLIC BLOOD PRESSURE: 86 MMHG | HEART RATE: 99 BPM | SYSTOLIC BLOOD PRESSURE: 191 MMHG

## 2024-04-06 LAB
ANION GAP SERPL CALC-SCNC: 12 MMOL/L — SIGNIFICANT CHANGE UP (ref 7–14)
BUN SERPL-MCNC: 15 MG/DL — SIGNIFICANT CHANGE UP (ref 10–20)
CALCIUM SERPL-MCNC: 9.6 MG/DL — SIGNIFICANT CHANGE UP (ref 8.4–10.4)
CHLORIDE SERPL-SCNC: 102 MMOL/L — SIGNIFICANT CHANGE UP (ref 98–110)
CO2 SERPL-SCNC: 25 MMOL/L — SIGNIFICANT CHANGE UP (ref 17–32)
CREAT SERPL-MCNC: 0.8 MG/DL — SIGNIFICANT CHANGE UP (ref 0.7–1.5)
EGFR: 78 ML/MIN/1.73M2 — SIGNIFICANT CHANGE UP
GLUCOSE SERPL-MCNC: 100 MG/DL — HIGH (ref 70–99)
HCT VFR BLD CALC: 29.3 % — LOW (ref 37–47)
HGB BLD-MCNC: 9.8 G/DL — LOW (ref 12–16)
MCHC RBC-ENTMCNC: 30.5 PG — SIGNIFICANT CHANGE UP (ref 27–31)
MCHC RBC-ENTMCNC: 33.4 G/DL — SIGNIFICANT CHANGE UP (ref 32–37)
MCV RBC AUTO: 91.3 FL — SIGNIFICANT CHANGE UP (ref 81–99)
NRBC # BLD: 0 /100 WBCS — SIGNIFICANT CHANGE UP (ref 0–0)
PLATELET # BLD AUTO: 470 K/UL — HIGH (ref 130–400)
PMV BLD: 9.3 FL — SIGNIFICANT CHANGE UP (ref 7.4–10.4)
POTASSIUM SERPL-MCNC: 4.1 MMOL/L — SIGNIFICANT CHANGE UP (ref 3.5–5)
POTASSIUM SERPL-SCNC: 4.1 MMOL/L — SIGNIFICANT CHANGE UP (ref 3.5–5)
RBC # BLD: 3.21 M/UL — LOW (ref 4.2–5.4)
RBC # FLD: 13.4 % — SIGNIFICANT CHANGE UP (ref 11.5–14.5)
SODIUM SERPL-SCNC: 139 MMOL/L — SIGNIFICANT CHANGE UP (ref 135–146)
WBC # BLD: 13.59 K/UL — HIGH (ref 4.8–10.8)
WBC # FLD AUTO: 13.59 K/UL — HIGH (ref 4.8–10.8)

## 2024-04-06 PROCEDURE — 99239 HOSP IP/OBS DSCHRG MGMT >30: CPT

## 2024-04-06 RX ORDER — FERROUS SULFATE 325(65) MG
1 TABLET ORAL
Qty: 0 | Refills: 0 | DISCHARGE

## 2024-04-06 RX ORDER — NALOXONE HYDROCHLORIDE 4 MG/.1ML
0.4 SPRAY NASAL
Qty: 0 | Refills: 0 | DISCHARGE

## 2024-04-06 RX ORDER — TRAMADOL HYDROCHLORIDE 50 MG/1
1 TABLET ORAL
Qty: 0 | Refills: 0 | DISCHARGE
Start: 2024-04-06

## 2024-04-06 RX ORDER — AMLODIPINE BESYLATE 2.5 MG/1
1 TABLET ORAL
Qty: 0 | Refills: 0 | DISCHARGE
Start: 2024-04-06

## 2024-04-06 RX ORDER — ACETAMINOPHEN 500 MG
3 TABLET ORAL
Qty: 0 | Refills: 0 | DISCHARGE
Start: 2024-04-06

## 2024-04-06 RX ORDER — ALPRAZOLAM 0.25 MG
0.25 TABLET ORAL DAILY
Refills: 0 | Status: DISCONTINUED | OUTPATIENT
Start: 2024-04-06 | End: 2024-04-06

## 2024-04-06 RX ORDER — LISINOPRIL 2.5 MG/1
1 TABLET ORAL
Qty: 0 | Refills: 0 | DISCHARGE
Start: 2024-04-06

## 2024-04-06 RX ORDER — PANTOPRAZOLE SODIUM 20 MG/1
1 TABLET, DELAYED RELEASE ORAL
Qty: 0 | Refills: 0 | DISCHARGE
Start: 2024-04-06

## 2024-04-06 RX ORDER — HYDROCODONE BITARTRATE AND ACETAMINOPHEN 7.5; 325 MG/15ML; MG/15ML
1 SOLUTION ORAL
Refills: 0 | DISCHARGE

## 2024-04-06 RX ORDER — IRON SUCROSE 20 MG/ML
200 INJECTION, SOLUTION INTRAVENOUS EVERY 24 HOURS
Refills: 0 | Status: DISCONTINUED | OUTPATIENT
Start: 2024-04-06 | End: 2024-04-06

## 2024-04-06 RX ADMIN — Medication 975 MILLIGRAM(S): at 06:26

## 2024-04-06 RX ADMIN — AMLODIPINE BESYLATE 5 MILLIGRAM(S): 2.5 TABLET ORAL at 06:28

## 2024-04-06 RX ADMIN — PANTOPRAZOLE SODIUM 40 MILLIGRAM(S): 20 TABLET, DELAYED RELEASE ORAL at 17:18

## 2024-04-06 RX ADMIN — Medication 975 MILLIGRAM(S): at 07:05

## 2024-04-06 RX ADMIN — MORPHINE SULFATE 15 MILLIGRAM(S): 50 CAPSULE, EXTENDED RELEASE ORAL at 07:06

## 2024-04-06 RX ADMIN — Medication 0.25 MILLIGRAM(S): at 09:12

## 2024-04-06 RX ADMIN — CHLORHEXIDINE GLUCONATE 1 APPLICATION(S): 213 SOLUTION TOPICAL at 06:29

## 2024-04-06 RX ADMIN — Medication 975 MILLIGRAM(S): at 13:56

## 2024-04-06 RX ADMIN — GABAPENTIN 400 MILLIGRAM(S): 400 CAPSULE ORAL at 06:28

## 2024-04-06 RX ADMIN — MORPHINE SULFATE 15 MILLIGRAM(S): 50 CAPSULE, EXTENDED RELEASE ORAL at 06:24

## 2024-04-06 RX ADMIN — MORPHINE SULFATE 15 MILLIGRAM(S): 50 CAPSULE, EXTENDED RELEASE ORAL at 00:22

## 2024-04-06 RX ADMIN — PANTOPRAZOLE SODIUM 40 MILLIGRAM(S): 20 TABLET, DELAYED RELEASE ORAL at 06:28

## 2024-04-06 RX ADMIN — MORPHINE SULFATE 15 MILLIGRAM(S): 50 CAPSULE, EXTENDED RELEASE ORAL at 14:08

## 2024-04-06 RX ADMIN — MORPHINE SULFATE 15 MILLIGRAM(S): 50 CAPSULE, EXTENDED RELEASE ORAL at 13:56

## 2024-04-06 RX ADMIN — Medication 975 MILLIGRAM(S): at 14:08

## 2024-04-06 RX ADMIN — GABAPENTIN 400 MILLIGRAM(S): 400 CAPSULE ORAL at 13:55

## 2024-04-06 NOTE — PROGRESS NOTE ADULT - SUBJECTIVE AND OBJECTIVE BOX
----------Daily Progress Note----------    HISTORY OF PRESENT ILLNESS:  Patient is a 74 yo F w/PMH of chronic back pain, PTSD, anxiety, and depression who presented to ED after 2 episodes of syncope on the day prior to presentation w/fall w/RLE pain and back pain after the fall. She denies any history of seizure or loss of bladder/bowel control. Of note, patient has URI symptoms that started several weeks ago without improvement on amoxicillin and ciprofloxacin and had diarrhea a few days ago which she treated with imodium. She also reported having black stools for a month. Her ED vitals were notable for hypertensive urgency to  and tachycardia to 98, labs notable for WBC 14.6 and hgb 9.8, trauma work up notable for nondisplaced malleolar fracture at right ankle and compression deformity at T7, and EKG showing NS with PACs. In the ED, her right ankle was splinted. She is now admitted to telemetry for syncope workup.      Today is hospital day 5d.     INTERVAL HOSPITAL COURSE / OVERNIGHT EVENTS:  O/N events:  None    24 hr events:  None    Patient was examined and seen at bedside. Reports pain being well controlled, denies any more abdominal pain, endorses improved appetite, no BM since colonoscopy 2 days ago, endorses anxiety, denies any other issues    Review of Systems: Otherwise unremarkable       <<<<<PAST MEDICAL & SURGICAL HISTORY>>>>>  Chronic back pain    Post traumatic stress disorder (PTSD)    Depression      ALLERGIES  No Known Allergies      Home Medications:  gabapentin 600 mg oral tablet: 1 tab(s) orally 3 times a day (01 Apr 2024 18:22)  hydrocodone-acetaminophen 10 mg-325 mg oral tablet: 1 tab(s) orally 3 times a day as needed for  severe pain (01 Apr 2024 18:22)  morphine 15 mg/12 hr oral tablet, extended release: 30 milligram(s) orally 2 times a day (01 Apr 2024 18:23)        MEDICATIONS  STANDING MEDICATIONS  acetaminophen     Tablet .. 975 milliGRAM(s) Oral every 8 hours  amLODIPine   Tablet 5 milliGRAM(s) Oral daily  bisacodyl 20 milliGRAM(s) Oral at bedtime  chlorhexidine 2% Cloths 1 Application(s) Topical <User Schedule>  fluticasone propionate 50 MICROgram(s)/spray Nasal Spray 1 Spray(s) Both Nostrils two times a day  gabapentin 400 milliGRAM(s) Oral three times a day  lisinopril 5 milliGRAM(s) Oral at bedtime  pantoprazole    Tablet 40 milliGRAM(s) Oral two times a day    PRN MEDICATIONS  ALPRAZolam 0.25 milliGRAM(s) Oral daily PRN  guaifenesin/dextromethorphan Oral Liquid 10 milliLiter(s) Oral every 8 hours PRN  morphine  IR 15 milliGRAM(s) Oral every 6 hours PRN  traMADol 50 milliGRAM(s) Oral every 6 hours PRN  zolpidem 5 milliGRAM(s) Oral at bedtime PRN    VITALS:  T(F): 98.1  HR: 101  BP: 132/62  RR: 18  SpO2: --    <<<<<PHYSICAL EXAM>>>>>  GENERAL: NAD  HEENT: Normocephalic, atraumatic, mucous membranes moist, EOMI, PERRLA, bilateral sclera anicteric, has conjunctival pallor  PULMONARY: Clear to auscultation bilaterally. No wheezing or crackles  CARDIOVASCULAR: Regular rate and rhythm, S1-S2, no murmurs, rubs, or gallops  GASTROINTESTINAL: Soft, non-distended, non-tender, no guarding  SKIN/EXTREMITIES: Warm, 2+ tibialis posterior pulses, LLE w/ace wrapping around lower shin and ankle RLE w/ACE wrapping and splint from foot to knee  NEUROLOGIC/MUSCULOSKELETAL: AOx4, grossly moving all extremities, no focal deficits    <<<<<LABS>>>>>                        9.8    13.59 )-----------( 470      ( 06 Apr 2024 08:45 )             29.3     04-06    139  |  102  |  15  ----------------------------<  100<H>  4.1   |  25  |  0.8    Ca    9.6      06 Apr 2024 08:45  Mg     2.0     04-04    TPro  6.4  /  Alb  4.0  /  TBili  0.3  /  DBili  x   /  AST  13  /  ALT  13  /  AlkPhos  54  04-04      Urinalysis Basic - ( 06 Apr 2024 08:45 )    Color: x / Appearance: x / SG: x / pH: x  Gluc: 100 mg/dL / Ketone: x  / Bili: x / Urobili: x   Blood: x / Protein: x / Nitrite: x   Leuk Esterase: x / RBC: x / WBC x   Sq Epi: x / Non Sq Epi: x / Bacteria: x          [id number]        <<<<<RADIOLOGY>>>>>      -----------------------------------------------------------------------------------------------------------------------------------------------------------------------------------------------

## 2024-04-06 NOTE — PROGRESS NOTE ADULT - REASON FOR ADMISSION
fall, syncope

## 2024-04-06 NOTE — PROGRESS NOTE ADULT - PROVIDER SPECIALTY LIST ADULT
Gastroenterology
Internal Medicine
Gastroenterology
Hospitalist
Internal Medicine
Internal Medicine
Podiatry
Internal Medicine
Internal Medicine
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
## 2024-04-09 ENCOUNTER — NON-APPOINTMENT (OUTPATIENT)
Age: 74
End: 2024-04-09

## 2024-04-09 PROBLEM — F43.10 POST-TRAUMATIC STRESS DISORDER, UNSPECIFIED: Chronic | Status: ACTIVE | Noted: 2024-04-01

## 2024-04-09 PROBLEM — M54.9 DORSALGIA, UNSPECIFIED: Chronic | Status: ACTIVE | Noted: 2024-04-01

## 2024-04-09 PROBLEM — F32.A DEPRESSION, UNSPECIFIED: Chronic | Status: ACTIVE | Noted: 2024-04-01

## 2024-04-09 NOTE — CHART NOTE - NSCHARTNOTEFT_GEN_A_CORE
Scheduled appt 09/03/2024 09:00 AM w/ Dr. Mccollum @ [address] (gastro). Scheduled appt 09/03/2024 3:20PM w/ Dr. Mccollum @ [address] (gastro).

## 2024-04-11 ENCOUNTER — APPOINTMENT (OUTPATIENT)
Dept: PAIN MANAGEMENT | Facility: CLINIC | Age: 74
End: 2024-04-11

## 2024-04-12 ENCOUNTER — APPOINTMENT (OUTPATIENT)
Dept: PODIATRY | Facility: CLINIC | Age: 74
End: 2024-04-12
Payer: MEDICARE

## 2024-04-12 ENCOUNTER — OUTPATIENT (OUTPATIENT)
Dept: OUTPATIENT SERVICES | Facility: HOSPITAL | Age: 74
LOS: 1 days | End: 2024-04-12
Payer: MEDICARE

## 2024-04-12 VITALS
HEART RATE: 60 BPM | DIASTOLIC BLOOD PRESSURE: 67 MMHG | BODY MASS INDEX: 28.17 KG/M2 | HEIGHT: 64 IN | WEIGHT: 165 LBS | SYSTOLIC BLOOD PRESSURE: 128 MMHG

## 2024-04-12 DIAGNOSIS — M25.571 PAIN IN RIGHT ANKLE AND JOINTS OF RIGHT FOOT: ICD-10-CM

## 2024-04-12 PROCEDURE — 99204 OFFICE O/P NEW MOD 45 MIN: CPT

## 2024-04-12 NOTE — REVIEW OF SYSTEMS
[Limb Pain] : limb pain [Limb Swelling] : limb swelling [Fainting] : fainting [Difficulty Walking] : difficulty walking [Negative] : Cardiovascular [Skin Wound] : no skin wound

## 2024-04-12 NOTE — HISTORY OF PRESENT ILLNESS
[FreeTextEntry1] : Patient presents to clinic complaining of R ankle fracture.  Injury occurred on 4/1 when she passed out and fell in her kitchen. Went to the ED, where they took XRs revealing non displaced lat mall fracture and splinted patient. Continued to wear splint and complains of pain to the lateral ankle. Also notes swelling to the R foot as well.  Denies any numbness, tingling or burning sensations.

## 2024-04-12 NOTE — PHYSICAL EXAM
[Ankle Swelling (On Exam)] : present [Ankle Swelling Bilaterally] : bilaterally  [2+] : left foot dorsalis pedis 2+ [Skin Lesions] : no skin lesions [de-identified] : Pain on palpation to the R lateral malleolus  and along the ATFL, CFL ligaments.  Decreased ROM of the R AJ.  [FreeTextEntry1] : Edema noted throughout the R foot, most significant at the R lateral malleolus.  Edema noted to left lower extremity.  [Diminished Throughout Right Foot] : normal sensation with monofilament testing throughout right foot [Diminished Throughout Left Foot] : normal sensation with monofilament testing throughout left foot

## 2024-04-12 NOTE — ASSESSMENT
[FreeTextEntry1] : Non Displaced Lateral Malleolus Fracture   -Patient seen and evaluated in clinic today with all questions and concerns addressed and answered.  -Rx CAM boot  -Rx rolling walker  -Patient to return to clinic in one month

## 2024-04-15 DIAGNOSIS — Z79.891 LONG TERM (CURRENT) USE OF OPIATE ANALGESIC: ICD-10-CM

## 2024-04-15 DIAGNOSIS — K25.4 CHRONIC OR UNSPECIFIED GASTRIC ULCER WITH HEMORRHAGE: ICD-10-CM

## 2024-04-15 DIAGNOSIS — R26.2 DIFFICULTY IN WALKING, NOT ELSEWHERE CLASSIFIED: ICD-10-CM

## 2024-04-15 DIAGNOSIS — I16.0 HYPERTENSIVE URGENCY: ICD-10-CM

## 2024-04-15 DIAGNOSIS — S82.61XA DISPLACED FRACTURE OF LATERAL MALLEOLUS OF RIGHT FIBULA, INITIAL ENCOUNTER FOR CLOSED FRACTURE: ICD-10-CM

## 2024-04-15 DIAGNOSIS — R55 SYNCOPE AND COLLAPSE: ICD-10-CM

## 2024-04-15 DIAGNOSIS — D50.0 IRON DEFICIENCY ANEMIA SECONDARY TO BLOOD LOSS (CHRONIC): ICD-10-CM

## 2024-04-15 DIAGNOSIS — S22.069A UNSPECIFIED FRACTURE OF T7-T8 VERTEBRA, INITIAL ENCOUNTER FOR CLOSED FRACTURE: ICD-10-CM

## 2024-04-23 ENCOUNTER — APPOINTMENT (OUTPATIENT)
Dept: NEUROSURGERY | Facility: CLINIC | Age: 74
End: 2024-04-23
Payer: MEDICARE

## 2024-04-23 DIAGNOSIS — S22.000A WEDGE COMPRESSION FRACTURE OF UNSPECIFIED THORACIC VERTEBRA, INITIAL ENCOUNTER FOR CLOSED FRACTURE: ICD-10-CM

## 2024-04-23 DIAGNOSIS — S22.060A WEDGE COMPRESSION FRACTURE OF T7-T8 VERTEBRA, INITIAL ENCOUNTER FOR CLOSED FRACTURE: ICD-10-CM

## 2024-04-23 PROCEDURE — 99203 OFFICE O/P NEW LOW 30 MIN: CPT

## 2024-04-24 DIAGNOSIS — S93.401A SPRAIN OF UNSPECIFIED LIGAMENT OF RIGHT ANKLE, INITIAL ENCOUNTER: ICD-10-CM
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
## 2024-04-24 DIAGNOSIS — R26.2 DIFFICULTY IN WALKING, NOT ELSEWHERE CLASSIFIED: ICD-10-CM

## 2024-04-24 DIAGNOSIS — M25.579 PAIN IN UNSPECIFIED ANKLE AND JOINTS OF UNSPECIFIED FOOT: ICD-10-CM

## 2024-05-06 ENCOUNTER — APPOINTMENT (OUTPATIENT)
Dept: PODIATRY | Facility: CLINIC | Age: 74
End: 2024-05-06

## 2024-05-07 NOTE — END OF VISIT
[FreeTextEntry3] :  I, Dr. Mark, personally performed the evaluation and management (E/M) services for this new patient. That E/M includes conducting the clinically appropriate initial history &/or exam, assessing all conditions, and establishing the plan of care. Today, my STEPHON, was here to observe my evaluation and management service for this patient & follow plan of care established by me going forward.

## 2024-05-07 NOTE — HISTORY OF PRESENT ILLNESS
[FreeTextEntry1] : Patricia Che is a 73 year old Woman presents for post hospital encounter follow up visit, She presented to the ED on 4/1 for back pain and RLE pain s/p 2 same day syncopal episodes 1 day prior to her ED arrival. Trauma work-up revealed T7 vertebrae compression and nondisplaced right malleolar fracture for which she received a splint after podiatry eval. She has a hx of chronic back pain, managed by pain management (Roni and team) and was tapered off narcotics earlier this year in January by the team. She was managed by Fely Zamarripa since 2007 per chart review. She also has a hx of PTSD, anxiety, and depression. In the office today she reports having a burning sensation in her back with the feeling of a finger twisting it firmly into her back. She denies lower extremity weakness, no numbness or tingling.      Physical Exam: Constitutional: Well appearing, no distress HEENT: Normocephalic Atraumatic Psychiatric: Awake, alert, oriented to person, place, situation and time. Normal affect. Follows commands. Language: Speech is clear, fluent with good repetition & comprehension. No dysarthria. Pulmonary: No respiratory distress    Neurologic: CN II-XII grossly intact; EOMI with no nystagmus. No facial asymmetry bilaterally, full eye closure strength bilaterally. Hearing normal. Head turning & shoulder shrug intact, bilaterally. Tongue midline, normal movements, no atrophy. Smile symmetrical.  Palpation: pain to thoracic region  Strength: Full strength in all major muscle groups Sensation: intact. Motor: No pronator drift, muscle strength of bilateral UE and bilateral LE: 5/5. Normal muscle tone. Reflexes: normal   No Clonus No Oconnor's No Romberg's   ROM: limited ROM to back due to pain Straight-Leg Raise Test Left: Negative Straight-Leg Raise Test Right: Negative Gait: balanced gait but limps due to presence of R LE splint.

## 2024-05-07 NOTE — ASSESSMENT
[FreeTextEntry1] :  Referral given for follow up with thoracic lumbar spine specialist [facility]  Talat Mark MD

## 2024-05-09 ENCOUNTER — APPOINTMENT (OUTPATIENT)
Dept: PAIN MANAGEMENT | Facility: CLINIC | Age: 74
End: 2024-05-09

## 2024-05-20 ENCOUNTER — APPOINTMENT (OUTPATIENT)
Dept: PODIATRY | Facility: CLINIC | Age: 74
End: 2024-05-20
Payer: MEDICARE

## 2024-05-20 ENCOUNTER — RESULT REVIEW (OUTPATIENT)
Age: 74
End: 2024-05-20

## 2024-05-20 ENCOUNTER — OUTPATIENT (OUTPATIENT)
Dept: OUTPATIENT SERVICES | Facility: HOSPITAL | Age: 74
LOS: 1 days | End: 2024-05-20
Payer: MEDICARE

## 2024-05-20 DIAGNOSIS — M25.571 PAIN IN RIGHT ANKLE AND JOINTS OF RIGHT FOOT: ICD-10-CM

## 2024-05-20 DIAGNOSIS — Z00.00 ENCOUNTER FOR GENERAL ADULT MEDICAL EXAMINATION WITHOUT ABNORMAL FINDINGS: ICD-10-CM

## 2024-05-20 PROCEDURE — 99204 OFFICE O/P NEW MOD 45 MIN: CPT

## 2024-05-20 PROCEDURE — 73600 X-RAY EXAM OF ANKLE: CPT | Mod: 26,RT

## 2024-05-20 PROCEDURE — 73600 X-RAY EXAM OF ANKLE: CPT | Mod: RT

## 2024-05-20 PROCEDURE — 99214 OFFICE O/P EST MOD 30 MIN: CPT

## 2024-05-20 NOTE — REVIEW OF SYSTEMS
[Limb Pain] : limb pain [Limb Swelling] : limb swelling [Skin Wound] : no skin wound [Fainting] : fainting [Difficulty Walking] : difficulty walking [Negative] : Cardiovascular

## 2024-05-20 NOTE — PHYSICAL EXAM
[Ankle Swelling Bilaterally] : bilaterally  [Ankle Swelling (On Exam)] : present [2+] : left foot dorsalis pedis 2+ [de-identified] : Pain on palpation to the R lateral malleolus  and along the ATFL, CFL ligaments.  Decreased ROM of the R AJ.  [Skin Lesions] : no skin lesions [FreeTextEntry1] : Edema noted throughout the R foot, most significant at the R lateral malleolus.  Edema noted to left lower extremity.  [Diminished Throughout Right Foot] : normal sensation with monofilament testing throughout right foot [Diminished Throughout Left Foot] : normal sensation with monofilament testing throughout left foot

## 2024-05-20 NOTE — ASSESSMENT
[FreeTextEntry1] : Non Displaced Lateral Malleolus Fracture   -Patient seen and evaluated in clinic today with all questions and concerns addressed and answered.  -Rx CAM boot  -Rx rolling walker  -Patient to return to clinic in one week with new xray right ankle.

## 2024-05-21 DIAGNOSIS — M25.571 PAIN IN RIGHT ANKLE AND JOINTS OF RIGHT FOOT: ICD-10-CM

## 2024-05-23 ENCOUNTER — APPOINTMENT (OUTPATIENT)
Dept: PODIATRY | Facility: CLINIC | Age: 74
End: 2024-05-23
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
## 2024-05-30 DIAGNOSIS — M25.571 PAIN IN RIGHT ANKLE AND JOINTS OF RIGHT FOOT: ICD-10-CM

## 2024-05-30 DIAGNOSIS — M79.671 PAIN IN RIGHT FOOT: ICD-10-CM

## 2024-05-30 DIAGNOSIS — M25.373 OTHER INSTABILITY, UNSPECIFIED ANKLE: ICD-10-CM

## 2024-06-04 ENCOUNTER — OUTPATIENT (OUTPATIENT)
Dept: OUTPATIENT SERVICES | Facility: HOSPITAL | Age: 74
LOS: 1 days | End: 2024-06-04
Payer: MEDICARE

## 2024-06-04 ENCOUNTER — APPOINTMENT (OUTPATIENT)
Dept: PODIATRY | Facility: CLINIC | Age: 74
End: 2024-06-04
Payer: MEDICARE

## 2024-06-04 DIAGNOSIS — Z00.00 ENCOUNTER FOR GENERAL ADULT MEDICAL EXAMINATION WITHOUT ABNORMAL FINDINGS: ICD-10-CM

## 2024-06-04 DIAGNOSIS — S93.401A SPRAIN OF UNSPECIFIED LIGAMENT OF RIGHT ANKLE, INITIAL ENCOUNTER: ICD-10-CM

## 2024-06-04 DIAGNOSIS — G89.29 OTHER CHRONIC PAIN: ICD-10-CM

## 2024-06-04 PROCEDURE — 99204 OFFICE O/P NEW MOD 45 MIN: CPT

## 2024-06-04 PROCEDURE — 99214 OFFICE O/P EST MOD 30 MIN: CPT

## 2024-06-06 ENCOUNTER — APPOINTMENT (OUTPATIENT)
Dept: PAIN MANAGEMENT | Facility: CLINIC | Age: 74
End: 2024-06-06

## 2024-06-10 PROBLEM — S93.401A SPRAIN OF ANKLE, RIGHT: Status: ACTIVE | Noted: 2024-04-12

## 2024-06-10 PROBLEM — G89.29 PAIN, CHRONIC: Status: ACTIVE | Noted: 2022-09-08

## 2024-06-10 NOTE — PHYSICAL EXAM
[Ankle Swelling (On Exam)] : present [Ankle Swelling Bilaterally] : bilaterally  [2+] : left foot dorsalis pedis 2+ [Skin Lesions] : no skin lesions [de-identified] : Pain on palpation to the R lateral malleolus  and along the ATFL, CFL ligaments.  Decreased ROM of the R AJ.  [FreeTextEntry1] : Edema noted throughout the R foot, most significant at the R lateral malleolus.  Edema noted to left lower extremity.  [Diminished Throughout Right Foot] : normal sensation with monofilament testing throughout right foot [Diminished Throughout Left Foot] : normal sensation with monofilament testing throughout left foot

## 2024-06-10 NOTE — ASSESSMENT
[FreeTextEntry1] : Non Displaced Lateral Malleolus Fracture   -Patient seen and evaluated in clinic today with all questions and concerns addressed and answered.  -Transfer to WBAT to bilateral feet.  - Xray reviewed with patient no signs of fracture.  -Patient to return to clinic  as needed.

## 2024-06-12 DIAGNOSIS — S93.401A SPRAIN OF UNSPECIFIED LIGAMENT OF RIGHT ANKLE, INITIAL ENCOUNTER: ICD-10-CM

## 2024-06-12 DIAGNOSIS — G89.29 OTHER CHRONIC PAIN: ICD-10-CM

## 2024-06-12 DIAGNOSIS — M25.571 PAIN IN RIGHT ANKLE AND JOINTS OF RIGHT FOOT: ICD-10-CM
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
## 2024-06-26 ENCOUNTER — NON-APPOINTMENT (OUTPATIENT)
Age: 74
End: 2024-06-26

## 2024-07-11 NOTE — REASON FOR VISIT
Surgery
Internal Medicine
Surgery
Hospitalist
Surgery
Surgery
Gastroenterology
Gastroenterology
[FreeTextEntry1] : "I have anxiety"

## 2024-08-20 ENCOUNTER — NON-APPOINTMENT (OUTPATIENT)
Age: 74
End: 2024-08-20

## 2024-09-03 ENCOUNTER — APPOINTMENT (OUTPATIENT)
Dept: GASTROENTEROLOGY | Facility: CLINIC | Age: 74
End: 2024-09-03

## 2024-09-12 ENCOUNTER — NON-APPOINTMENT (OUTPATIENT)
Age: 74
End: 2024-09-12

## 2024-09-17 ENCOUNTER — APPOINTMENT (OUTPATIENT)
Dept: HOME HEALTH SERVICES | Facility: HOME HEALTH | Age: 74
End: 2024-09-17

## 2024-09-17 ENCOUNTER — APPOINTMENT (OUTPATIENT)
Dept: HOME HEALTH SERVICES | Facility: HOME HEALTH | Age: 74
End: 2024-09-17
Payer: MEDICARE

## 2024-09-17 VITALS
DIASTOLIC BLOOD PRESSURE: 75 MMHG | RESPIRATION RATE: 16 BRPM | SYSTOLIC BLOOD PRESSURE: 135 MMHG | HEIGHT: 64 IN | OXYGEN SATURATION: 99 % | WEIGHT: 165 LBS | BODY MASS INDEX: 28.17 KG/M2 | HEART RATE: 71 BPM

## 2024-09-17 DIAGNOSIS — Z72.0 TOBACCO USE: ICD-10-CM

## 2024-09-17 DIAGNOSIS — F11.20 OPIOID DEPENDENCE, UNCOMPLICATED: ICD-10-CM

## 2024-09-17 DIAGNOSIS — M54.16 RADICULOPATHY, LUMBAR REGION: ICD-10-CM

## 2024-09-17 DIAGNOSIS — I10 ESSENTIAL (PRIMARY) HYPERTENSION: ICD-10-CM

## 2024-09-17 DIAGNOSIS — Z79.891 LONG TERM (CURRENT) USE OF OPIATE ANALGESIC: ICD-10-CM

## 2024-09-17 DIAGNOSIS — Z87.2 PERSONAL HISTORY OF DISEASES OF THE SKIN AND SUBCUTANEOUS TISSUE: ICD-10-CM

## 2024-09-17 DIAGNOSIS — R42 DIZZINESS AND GIDDINESS: ICD-10-CM

## 2024-09-17 DIAGNOSIS — F41.1 GENERALIZED ANXIETY DISORDER: ICD-10-CM

## 2024-09-17 DIAGNOSIS — Z87.11 PERSONAL HISTORY OF PEPTIC ULCER DISEASE: ICD-10-CM

## 2024-09-17 DIAGNOSIS — H61.21 IMPACTED CERUMEN, RIGHT EAR: ICD-10-CM

## 2024-09-17 DIAGNOSIS — F41.0 GENERALIZED ANXIETY DISORDER: ICD-10-CM

## 2024-09-17 DIAGNOSIS — M47.816 SPONDYLOSIS W/OUT MYELOPATHY OR RADICULOPATHY, LUMBAR REGION: ICD-10-CM

## 2024-09-17 DIAGNOSIS — S22.060A WEDGE COMPRESSION FRACTURE OF T7-T8 VERTEBRA, INITIAL ENCOUNTER FOR CLOSED FRACTURE: ICD-10-CM

## 2024-09-17 DIAGNOSIS — Z86.39 PERSONAL HISTORY OF OTHER ENDOCRINE, NUTRITIONAL AND METABOLIC DISEASE: ICD-10-CM

## 2024-09-17 DIAGNOSIS — M25.571 PAIN IN RIGHT ANKLE AND JOINTS OF RIGHT FOOT: ICD-10-CM

## 2024-09-17 DIAGNOSIS — H91.93 UNSPECIFIED HEARING LOSS, BILATERAL: ICD-10-CM

## 2024-09-17 DIAGNOSIS — M25.551 PAIN IN RIGHT HIP: ICD-10-CM

## 2024-09-17 DIAGNOSIS — M25.552 PAIN IN LEFT HIP: ICD-10-CM

## 2024-09-17 DIAGNOSIS — Z87.898 PERSONAL HISTORY OF OTHER SPECIFIED CONDITIONS: ICD-10-CM

## 2024-09-17 DIAGNOSIS — S93.401A SPRAIN OF UNSPECIFIED LIGAMENT OF RIGHT ANKLE, INITIAL ENCOUNTER: ICD-10-CM

## 2024-09-17 DIAGNOSIS — E78.00 PURE HYPERCHOLESTEROLEMIA, UNSPECIFIED: ICD-10-CM

## 2024-09-17 DIAGNOSIS — S22.000A WEDGE COMPRESSION FRACTURE OF UNSPECIFIED THORACIC VERTEBRA, INITIAL ENCOUNTER FOR CLOSED FRACTURE: ICD-10-CM

## 2024-09-17 DIAGNOSIS — M47.817 SPONDYLOSIS W/OUT MYELOPATHY OR RADICULOPATHY, LUMBOSACRAL REGION: ICD-10-CM

## 2024-09-17 DIAGNOSIS — J01.00 ACUTE MAXILLARY SINUSITIS, UNSPECIFIED: ICD-10-CM

## 2024-09-17 DIAGNOSIS — F43.10 POST-TRAUMATIC STRESS DISORDER, UNSPECIFIED: ICD-10-CM

## 2024-09-17 DIAGNOSIS — G89.29 OTHER CHRONIC PAIN: ICD-10-CM

## 2024-09-17 PROCEDURE — 99349 HOME/RES VST EST MOD MDM 40: CPT

## 2024-09-17 RX ORDER — NALOXONE HYDROCHLORIDE NASAL 4 MG/.1ML
4 SPRAY NASAL
Refills: 0 | Status: ACTIVE | COMMUNITY
Start: 2024-09-17

## 2024-09-17 RX ORDER — LISINOPRIL 5 MG/1
5 TABLET ORAL DAILY
Qty: 90 | Refills: 0 | Status: ACTIVE | COMMUNITY
Start: 2024-09-17 | End: 1900-01-01

## 2024-09-17 RX ORDER — OXYCODONE 10 MG/1
10 TABLET ORAL 3 TIMES DAILY
Qty: 90 | Refills: 0 | Status: ACTIVE | COMMUNITY
Start: 2024-09-17 | End: 1900-01-01

## 2024-09-17 RX ORDER — DICLOFENAC EPOLAMINE 0.01 G/1
1.3 SYSTEM TOPICAL TWICE DAILY
Qty: 60 | Refills: 0 | Status: ACTIVE | COMMUNITY
Start: 2024-09-17 | End: 1900-01-01

## 2024-09-17 RX ORDER — GABAPENTIN 600 MG/1
600 TABLET, COATED ORAL 3 TIMES DAILY
Qty: 270 | Refills: 2 | Status: ACTIVE | COMMUNITY
Start: 2024-09-17 | End: 1900-01-01

## 2024-09-17 RX ORDER — DIAZEPAM 5 MG/1
5 TABLET ORAL
Qty: 30 | Refills: 0 | Status: ACTIVE | COMMUNITY
Start: 2024-09-17 | End: 1900-01-01

## 2024-09-18 NOTE — COUNSELING
[Normal Weight - ( BMI  <25 )] : normal weight - ( BMI  <25 ) [DASH diet recommended] : DASH diet recommended [] : colonoscopy [Improve mobility] : improve mobility [Decrease stress] : decrease stress [Discussed disease trajectory with patient/caregiver] : discussed disease trajectory with patient/caregiver [Likely to achieve goals/desired outcomes] : likely to achieve goals/desired outcomes [Patient/Caregiver not ready to engage in discussion] : patient/caregiver not ready to engage in discussion [Full Code] : Code Status: Full Code [No Limitations] : Treatment Guidelines: No limitations [Long Term Intubation] : Intubation: Long term intubation

## 2024-09-18 NOTE — REVIEW OF SYSTEMS
[Fatigue] : fatigue [Joint Pain] : joint pain [Muscle Pain] : muscle pain [Back Pain] : back pain [Insomnia] : insomnia [Anxiety] : anxiety [Negative] : ENT [Fever] : no fever [Chills] : no chills [Night Sweats] : no night sweats [Chest Pain] : no chest pain [Palpitations] : no palpitations [Leg Claudication] : no leg claudication [Shortness Of Breath] : no shortness of breath [Wheezing] : no wheezing [Abdominal Pain] : no abdominal pain [Nausea] : no nausea [Constipation] : no constipation [Vomiting] : no vomiting [Dysuria] : no dysuria [Itching] : no itching [Skin Rash] : no skin rash [Headache] : no headache [Suicidal] : not suicidal [Depression] : no depression

## 2024-09-18 NOTE — HISTORY OF PRESENT ILLNESS
[House Calls Co-Management Patient] : [unfilled] is a House Calls co-management patient [Patient is stable] : patient is stable [Patient] : patient [Education provided] : education provided [LastPVisitDate] : 2023 [FreeTextEntry1] : Chronic pain syndrome, HTN, PTSD [FreeTextEntry2] :   COVID SCREEN: Patient   denies fever, cough, trouble breathing, rash or contact with someone who tested positive for COVID-19.  N95 mask, gloves, eye wear and gown (if indicated) used during visit: YES  Total face to face time with patient is 45 min.  72 y/o with PMH of Chronic pain syndrome, PTSD, HTN, Opiate dependence, GERD was seen for follow up visit. Pt was recently discharged [facility] in [facility].  Reported she is in 10/10 pain since she didn't have enough pain meds from [facility]  Last Meds refills was 8/22/2024 - 30 pills (I stop  reviewed) Claimed she has pain everywhere, unable to pinpoint, had fracture of thoracic spine as History.  Discussed the importance of taking the medication madan: Opoid as prescribed and not exceeding the recommended dosage. Reviewed common side effects such as nausea, vomiting, constipation, pruritus, dizziness, dry mouth and sedation, high risk of fall  Educated on signs of misuse and the importance of regular follow-up appointments with pain management. Patient needs new pain Mx provider.  Otherwise, Patient denied any SOB, chest pain and palpitation. Dizziness on and off - chronic problem likely secondary to polypharmacy with pain meds    Patient reports no weight loss >10 lbs in the past 6 months. No changes in dentition or swallowing reported, No changes in hearing or vision reported. No changes in Cognition reported. Patient denies any symptoms of depression or anxiety.  No changes in gait or falls reported. Patient's home environment is safe.   .

## 2024-09-18 NOTE — REASON FOR VISIT
[Follow-Up] : a follow-up visit [Pre-Visit Preparation] : pre-visit preparation was done [FreeTextEntry1] : Chronic back pain, PTSD, anxiety, HTN, HLD, chronic pain, thyroid nodule, PUD [FreeTextEntry2] : chart review

## 2024-09-18 NOTE — HISTORY OF PRESENT ILLNESS
[House Calls Co-Management Patient] : [unfilled] is a House Calls co-management patient [Patient is stable] : patient is stable [Patient] : patient [Education provided] : education provided [LastPVisitDate] : 2023 [FreeTextEntry1] : Chronic pain syndrome, HTN, PTSD [FreeTextEntry2] :   COVID SCREEN: Patient   denies fever, cough, trouble breathing, rash or contact with someone who tested positive for COVID-19.  N95 mask, gloves, eye wear and gown (if indicated) used during visit: YES  Total face to face time with patient is 45 min.  74 y/o with PMH of Chronic pain syndrome, PTSD, HTN, Opiate dependence, GERD was seen for follow up visit. Pt was recently discharged from [facility] in August.  Reported she is in 10/10 pain since she didn't have enough pain meds from [facility]  Last Meds refills was 8/22/2024 - 30 pills (I stop  reviewed) Claimed she has pain everywhere, unable to pinpoint, had fracture of thoracic spine as History.  Discussed the importance of taking the medication madan: Opoid as prescribed and not exceeding the recommended dosage. Reviewed common side effects such as nausea, vomiting, constipation, pruritus, dizziness, dry mouth and sedation, high risk of fall  Educated on signs of misuse and the importance of regular follow-up appointments with pain management. Patient needs new pain Mx provider.  Otherwise, Patient denied any SOB, chest pain and palpitation. Dizziness on and off - chronic problem likely secondary to polypharmacy with pain meds    Patient reports no weight loss >10 lbs in the past 6 months. No changes in dentition or swallowing reported, No changes in hearing or vision reported. No changes in Cognition reported. Patient denies any symptoms of depression or anxiety.  No changes in gait or falls reported. Patient's home environment is safe.   .

## 2024-09-18 NOTE — HEALTH RISK ASSESSMENT
[HRA Reviewed] : Health risk assessment reviewed [Independent] : managing finances [Some assistance needed] : using transportation [No falls in past year] : Patient reported no falls in the past year [No] : The patient does not have visual impairment [TimeGetUpGo] : 6

## 2024-09-19 ENCOUNTER — LABORATORY RESULT (OUTPATIENT)
Age: 74
End: 2024-09-19

## 2024-09-23 DIAGNOSIS — K21.9 GASTRO-ESOPHAGEAL REFLUX DISEASE W/OUT ESOPHAGITIS: ICD-10-CM

## 2024-09-23 DIAGNOSIS — D53.9 NUTRITIONAL ANEMIA, UNSPECIFIED: ICD-10-CM

## 2024-09-23 RX ORDER — FAMOTIDINE 40 MG/1
40 TABLET, FILM COATED ORAL DAILY
Qty: 90 | Refills: 0 | Status: ACTIVE | COMMUNITY
Start: 2024-09-23 | End: 1900-01-01

## 2024-10-04 DIAGNOSIS — Z01.818 ENCOUNTER FOR OTHER PREPROCEDURAL EXAMINATION: ICD-10-CM

## 2024-10-07 ENCOUNTER — APPOINTMENT (OUTPATIENT)
Dept: HOME HEALTH SERVICES | Facility: HOME HEALTH | Age: 74
End: 2024-10-07
Payer: MEDICARE

## 2024-10-07 VITALS
WEIGHT: 165 LBS | RESPIRATION RATE: 16 BRPM | BODY MASS INDEX: 28.17 KG/M2 | TEMPERATURE: 97.6 F | HEIGHT: 64 IN | HEART RATE: 74 BPM | OXYGEN SATURATION: 99 % | DIASTOLIC BLOOD PRESSURE: 85 MMHG | SYSTOLIC BLOOD PRESSURE: 125 MMHG

## 2024-10-07 DIAGNOSIS — S22.060A WEDGE COMPRESSION FRACTURE OF T7-T8 VERTEBRA, INITIAL ENCOUNTER FOR CLOSED FRACTURE: ICD-10-CM

## 2024-10-07 DIAGNOSIS — S22.000A WEDGE COMPRESSION FRACTURE OF UNSPECIFIED THORACIC VERTEBRA, INITIAL ENCOUNTER FOR CLOSED FRACTURE: ICD-10-CM

## 2024-10-07 DIAGNOSIS — F11.20 OPIOID DEPENDENCE, UNCOMPLICATED: ICD-10-CM

## 2024-10-07 DIAGNOSIS — Z01.818 ENCOUNTER FOR OTHER PREPROCEDURAL EXAMINATION: ICD-10-CM

## 2024-10-07 DIAGNOSIS — F41.9 ANXIETY DISORDER, UNSPECIFIED: ICD-10-CM

## 2024-10-07 PROCEDURE — 99349 HOME/RES VST EST MOD MDM 40: CPT

## 2024-10-09 ENCOUNTER — LABORATORY RESULT (OUTPATIENT)
Age: 74
End: 2024-10-09

## 2024-10-09 ENCOUNTER — RX RENEWAL (OUTPATIENT)
Age: 74
End: 2024-10-09

## 2024-10-09 RX ORDER — AMLODIPINE BESYLATE 5 MG/1
5 TABLET ORAL
Qty: 90 | Refills: 0 | Status: ACTIVE | COMMUNITY
Start: 2024-10-09 | End: 1900-01-01

## 2024-11-11 ENCOUNTER — RX RENEWAL (OUTPATIENT)
Age: 74
End: 2024-11-11

## 2024-11-11 DIAGNOSIS — K21.9 GASTRO-ESOPHAGEAL REFLUX DISEASE W/OUT ESOPHAGITIS: ICD-10-CM

## 2024-11-11 RX ORDER — IBUPROFEN 600 MG/1
600 TABLET, FILM COATED ORAL DAILY
Qty: 30 | Refills: 0 | Status: ACTIVE | COMMUNITY
Start: 2024-11-11 | End: 1900-01-01

## 2024-11-11 RX ORDER — OMEPRAZOLE 40 MG/1
40 CAPSULE, DELAYED RELEASE ORAL
Qty: 90 | Refills: 0 | Status: ACTIVE | COMMUNITY
Start: 2024-11-11 | End: 1900-01-01

## 2024-12-02 DIAGNOSIS — Z01.818 ENCOUNTER FOR OTHER PREPROCEDURAL EXAMINATION: ICD-10-CM

## 2024-12-03 ENCOUNTER — LABORATORY RESULT (OUTPATIENT)
Age: 74
End: 2024-12-03

## 2024-12-03 ENCOUNTER — NON-APPOINTMENT (OUTPATIENT)
Age: 74
End: 2024-12-03

## 2024-12-06 ENCOUNTER — NON-APPOINTMENT (OUTPATIENT)
Age: 74
End: 2024-12-06

## 2024-12-10 ENCOUNTER — APPOINTMENT (OUTPATIENT)
Dept: HOME HEALTH SERVICES | Facility: HOME HEALTH | Age: 74
End: 2024-12-10
Payer: MEDICARE

## 2024-12-10 DIAGNOSIS — S22.000A WEDGE COMPRESSION FRACTURE OF UNSPECIFIED THORACIC VERTEBRA, INITIAL ENCOUNTER FOR CLOSED FRACTURE: ICD-10-CM

## 2024-12-10 DIAGNOSIS — F41.9 ANXIETY DISORDER, UNSPECIFIED: ICD-10-CM

## 2024-12-10 DIAGNOSIS — K21.9 GASTRO-ESOPHAGEAL REFLUX DISEASE W/OUT ESOPHAGITIS: ICD-10-CM

## 2024-12-10 DIAGNOSIS — F43.10 POST-TRAUMATIC STRESS DISORDER, UNSPECIFIED: ICD-10-CM

## 2024-12-10 DIAGNOSIS — Z87.898 PERSONAL HISTORY OF OTHER SPECIFIED CONDITIONS: ICD-10-CM

## 2024-12-10 DIAGNOSIS — I10 ESSENTIAL (PRIMARY) HYPERTENSION: ICD-10-CM

## 2024-12-10 DIAGNOSIS — Z87.19 PERSONAL HISTORY OF OTHER DISEASES OF THE DIGESTIVE SYSTEM: ICD-10-CM

## 2024-12-10 DIAGNOSIS — F11.20 OPIOID DEPENDENCE, UNCOMPLICATED: ICD-10-CM

## 2024-12-10 DIAGNOSIS — Z87.891 PERSONAL HISTORY OF NICOTINE DEPENDENCE: ICD-10-CM

## 2024-12-10 DIAGNOSIS — E78.00 PURE HYPERCHOLESTEROLEMIA, UNSPECIFIED: ICD-10-CM

## 2024-12-10 PROCEDURE — 99349 HOME/RES VST EST MOD MDM 40: CPT

## 2024-12-11 VITALS
HEIGHT: 64 IN | SYSTOLIC BLOOD PRESSURE: 125 MMHG | HEART RATE: 70 BPM | OXYGEN SATURATION: 97 % | WEIGHT: 165 LBS | RESPIRATION RATE: 16 BRPM | BODY MASS INDEX: 28.17 KG/M2 | DIASTOLIC BLOOD PRESSURE: 75 MMHG

## 2024-12-11 PROBLEM — Z87.891 HISTORY OF NICOTINE USE: Status: RESOLVED | Noted: 2023-03-13 | Resolved: 2024-12-10

## 2024-12-11 PROBLEM — Z87.898 HISTORY OF DIZZINESS: Status: RESOLVED | Noted: 2024-03-13 | Resolved: 2024-12-10

## 2024-12-11 PROBLEM — Z87.19 HISTORY OF GASTROESOPHAGEAL REFLUX (GERD): Status: RESOLVED | Noted: 2024-11-11 | Resolved: 2024-12-11

## 2024-12-30 ENCOUNTER — NON-APPOINTMENT (OUTPATIENT)
Age: 74
End: 2024-12-30

## 2024-12-30 RX ORDER — CYCLOBENZAPRINE HYDROCHLORIDE 30 MG/1
30 CAPSULE, EXTENDED RELEASE ORAL DAILY
Qty: 30 | Refills: 3 | Status: ACTIVE | COMMUNITY
Start: 2024-12-30 | End: 1900-01-01

## 2025-01-08 ENCOUNTER — NON-APPOINTMENT (OUTPATIENT)
Age: 75
End: 2025-01-08

## 2025-01-13 ENCOUNTER — LABORATORY RESULT (OUTPATIENT)
Age: 75
End: 2025-01-13

## 2025-01-14 ENCOUNTER — LABORATORY RESULT (OUTPATIENT)
Age: 75
End: 2025-01-14

## 2025-01-28 ENCOUNTER — TRANSCRIPTION ENCOUNTER (OUTPATIENT)
Age: 75
End: 2025-01-28

## 2025-02-07 ENCOUNTER — NON-APPOINTMENT (OUTPATIENT)
Age: 75
End: 2025-02-07

## 2025-02-18 ENCOUNTER — NON-APPOINTMENT (OUTPATIENT)
Age: 75
End: 2025-02-18

## 2025-02-19 ENCOUNTER — RX RENEWAL (OUTPATIENT)
Age: 75
End: 2025-02-19

## 2025-02-19 RX ORDER — CYCLOBENZAPRINE HYDROCHLORIDE 7.5 MG/1
7.5 TABLET, FILM COATED ORAL
Qty: 90 | Refills: 0 | Status: ACTIVE | COMMUNITY
Start: 2025-02-19 | End: 1900-01-01

## 2025-03-06 ENCOUNTER — RX RENEWAL (OUTPATIENT)
Age: 75
End: 2025-03-06

## 2025-03-10 ENCOUNTER — APPOINTMENT (OUTPATIENT)
Dept: HOME HEALTH SERVICES | Facility: HOME HEALTH | Age: 75
End: 2025-03-10
Payer: MEDICARE

## 2025-03-10 VITALS
SYSTOLIC BLOOD PRESSURE: 126 MMHG | DIASTOLIC BLOOD PRESSURE: 78 MMHG | OXYGEN SATURATION: 97 % | RESPIRATION RATE: 18 BRPM | TEMPERATURE: 97.2 F | HEART RATE: 74 BPM

## 2025-03-10 DIAGNOSIS — F43.10 POST-TRAUMATIC STRESS DISORDER, UNSPECIFIED: ICD-10-CM

## 2025-03-10 DIAGNOSIS — F11.20 OPIOID DEPENDENCE, UNCOMPLICATED: ICD-10-CM

## 2025-03-10 DIAGNOSIS — I10 ESSENTIAL (PRIMARY) HYPERTENSION: ICD-10-CM

## 2025-03-10 DIAGNOSIS — R42 DIZZINESS AND GIDDINESS: ICD-10-CM

## 2025-03-10 DIAGNOSIS — D53.9 NUTRITIONAL ANEMIA, UNSPECIFIED: ICD-10-CM

## 2025-03-10 DIAGNOSIS — E78.00 PURE HYPERCHOLESTEROLEMIA, UNSPECIFIED: ICD-10-CM

## 2025-03-10 DIAGNOSIS — K21.9 GASTRO-ESOPHAGEAL REFLUX DISEASE W/OUT ESOPHAGITIS: ICD-10-CM

## 2025-03-10 DIAGNOSIS — S22.000A WEDGE COMPRESSION FRACTURE OF UNSPECIFIED THORACIC VERTEBRA, INITIAL ENCOUNTER FOR CLOSED FRACTURE: ICD-10-CM

## 2025-03-10 DIAGNOSIS — F41.9 ANXIETY DISORDER, UNSPECIFIED: ICD-10-CM

## 2025-03-10 DIAGNOSIS — G89.29 OTHER CHRONIC PAIN: ICD-10-CM

## 2025-03-10 PROCEDURE — 99349 HOME/RES VST EST MOD MDM 40: CPT

## 2025-03-14 PROBLEM — R42 DIZZINESS: Status: ACTIVE | Noted: 2025-03-14

## 2025-03-21 ENCOUNTER — NON-APPOINTMENT (OUTPATIENT)
Age: 75
End: 2025-03-21

## 2025-04-16 ENCOUNTER — RX RENEWAL (OUTPATIENT)
Age: 75
End: 2025-04-16

## 2025-05-09 ENCOUNTER — NON-APPOINTMENT (OUTPATIENT)
Age: 75
End: 2025-05-09

## 2025-05-12 ENCOUNTER — APPOINTMENT (OUTPATIENT)
Dept: HOME HEALTH SERVICES | Facility: HOME HEALTH | Age: 75
End: 2025-05-12

## 2025-05-21 ENCOUNTER — TRANSCRIPTION ENCOUNTER (OUTPATIENT)
Age: 75
End: 2025-05-21

## 2025-05-22 ENCOUNTER — APPOINTMENT (OUTPATIENT)
Dept: HOME HEALTH SERVICES | Facility: HOME HEALTH | Age: 75
End: 2025-05-22
Payer: MEDICARE

## 2025-05-22 DIAGNOSIS — F41.9 ANXIETY DISORDER, UNSPECIFIED: ICD-10-CM

## 2025-05-22 DIAGNOSIS — E78.00 PURE HYPERCHOLESTEROLEMIA, UNSPECIFIED: ICD-10-CM

## 2025-05-22 DIAGNOSIS — F11.20 OPIOID DEPENDENCE, UNCOMPLICATED: ICD-10-CM

## 2025-05-22 DIAGNOSIS — S22.000A WEDGE COMPRESSION FRACTURE OF UNSPECIFIED THORACIC VERTEBRA, INITIAL ENCOUNTER FOR CLOSED FRACTURE: ICD-10-CM

## 2025-05-22 DIAGNOSIS — D53.9 NUTRITIONAL ANEMIA, UNSPECIFIED: ICD-10-CM

## 2025-05-22 DIAGNOSIS — M54.16 RADICULOPATHY, LUMBAR REGION: ICD-10-CM

## 2025-05-22 DIAGNOSIS — I10 ESSENTIAL (PRIMARY) HYPERTENSION: ICD-10-CM

## 2025-05-22 PROCEDURE — 99349 HOME/RES VST EST MOD MDM 40: CPT

## 2025-05-22 RX ORDER — DIAZEPAM 5 MG/1
5 TABLET ORAL
Qty: 60 | Refills: 0 | Status: ACTIVE | COMMUNITY
Start: 2025-05-22 | End: 1900-01-01

## 2025-06-10 ENCOUNTER — RX RENEWAL (OUTPATIENT)
Age: 75
End: 2025-06-10

## 2025-06-17 PROBLEM — M25.551 CHRONIC PAIN OF BOTH HIPS: Status: ACTIVE | Noted: 2025-06-17

## 2025-06-17 PROBLEM — M54.2 NECK PAIN: Status: ACTIVE | Noted: 2025-06-17

## 2025-06-30 ENCOUNTER — NON-APPOINTMENT (OUTPATIENT)
Age: 75
End: 2025-06-30

## 2025-06-30 ENCOUNTER — APPOINTMENT (OUTPATIENT)
Dept: HOME HEALTH SERVICES | Facility: HOME HEALTH | Age: 75
End: 2025-06-30

## 2025-06-30 VITALS
DIASTOLIC BLOOD PRESSURE: 72 MMHG | TEMPERATURE: 98.3 F | OXYGEN SATURATION: 95 % | HEART RATE: 80 BPM | RESPIRATION RATE: 19 BRPM | SYSTOLIC BLOOD PRESSURE: 126 MMHG

## 2025-06-30 PROBLEM — J06.9 URI (UPPER RESPIRATORY INFECTION): Status: ACTIVE | Noted: 2025-06-30 | Resolved: 2025-07-30

## 2025-06-30 RX ORDER — BENZONATATE 200 MG/1
200 CAPSULE ORAL 3 TIMES DAILY
Qty: 30 | Refills: 0 | Status: ACTIVE | COMMUNITY
Start: 2025-06-30

## 2025-06-30 RX ORDER — AZITHROMYCIN 250 MG/1
250 TABLET, FILM COATED ORAL
Qty: 1 | Refills: 0 | Status: ACTIVE | COMMUNITY
Start: 2025-06-30

## 2025-07-14 ENCOUNTER — TRANSCRIPTION ENCOUNTER (OUTPATIENT)
Age: 75
End: 2025-07-14

## 2025-07-23 ENCOUNTER — RX RENEWAL (OUTPATIENT)
Age: 75
End: 2025-07-23

## 2025-08-07 ENCOUNTER — TRANSCRIPTION ENCOUNTER (OUTPATIENT)
Age: 75
End: 2025-08-07

## 2025-08-07 ENCOUNTER — NON-APPOINTMENT (OUTPATIENT)
Age: 75
End: 2025-08-07

## 2025-08-07 DIAGNOSIS — J32.9 CHRONIC SINUSITIS, UNSPECIFIED: ICD-10-CM

## 2025-08-07 RX ORDER — GUAIFENESIN 400 MG/1
400 TABLET ORAL EVERY 8 HOURS
Qty: 45 | Refills: 0 | Status: ACTIVE | COMMUNITY
Start: 2025-08-07 | End: 1900-01-01

## 2025-08-11 ENCOUNTER — APPOINTMENT (OUTPATIENT)
Dept: HOME HEALTH SERVICES | Facility: HOME HEALTH | Age: 75
End: 2025-08-11
Payer: MEDICARE

## 2025-08-11 DIAGNOSIS — F43.10 POST-TRAUMATIC STRESS DISORDER, UNSPECIFIED: ICD-10-CM

## 2025-08-11 DIAGNOSIS — I10 ESSENTIAL (PRIMARY) HYPERTENSION: ICD-10-CM

## 2025-08-11 DIAGNOSIS — C44.91 BASAL CELL CARCINOMA OF SKIN, UNSPECIFIED: ICD-10-CM

## 2025-08-11 DIAGNOSIS — M47.812 SPONDYLOSIS W/OUT MYELOPATHY OR RADICULOPATHY, CERVICAL REGION: ICD-10-CM

## 2025-08-11 DIAGNOSIS — F11.20 OPIOID DEPENDENCE, UNCOMPLICATED: ICD-10-CM

## 2025-08-11 DIAGNOSIS — K21.9 GASTRO-ESOPHAGEAL REFLUX DISEASE W/OUT ESOPHAGITIS: ICD-10-CM

## 2025-08-11 DIAGNOSIS — S22.000A WEDGE COMPRESSION FRACTURE OF UNSPECIFIED THORACIC VERTEBRA, INITIAL ENCOUNTER FOR CLOSED FRACTURE: ICD-10-CM

## 2025-08-11 DIAGNOSIS — F41.9 ANXIETY DISORDER, UNSPECIFIED: ICD-10-CM

## 2025-08-11 PROCEDURE — 99349 HOME/RES VST EST MOD MDM 40: CPT

## 2025-08-11 RX ORDER — HYDROCODONE BITARTRATE AND ACETAMINOPHEN 10; 325 MG/1; MG/1
10-325 TABLET ORAL EVERY 8 HOURS
Qty: 90 | Refills: 0 | Status: ACTIVE | COMMUNITY
Start: 2025-08-11

## 2025-08-12 RX ORDER — CEFPODOXIME PROXETIL 200 MG/1
200 TABLET, FILM COATED ORAL
Qty: 20 | Refills: 0 | Status: DISCONTINUED | COMMUNITY
Start: 2025-08-07 | End: 2025-08-12

## 2025-08-12 RX ORDER — NAPROXEN 500 MG/1
500 TABLET, DELAYED RELEASE ORAL
Qty: 60 | Refills: 0 | Status: ACTIVE | COMMUNITY
Start: 2025-08-12 | End: 1900-01-01

## 2025-08-15 VITALS
HEIGHT: 64 IN | DIASTOLIC BLOOD PRESSURE: 65 MMHG | OXYGEN SATURATION: 96 % | HEART RATE: 75 BPM | SYSTOLIC BLOOD PRESSURE: 135 MMHG | TEMPERATURE: 98.1 F | WEIGHT: 165 LBS | RESPIRATION RATE: 16 BRPM | BODY MASS INDEX: 28.17 KG/M2

## 2025-08-25 ENCOUNTER — NON-APPOINTMENT (OUTPATIENT)
Age: 75
End: 2025-08-25

## 2025-09-19 RX ORDER — BUSPIRONE HYDROCHLORIDE 5 MG/1
5 TABLET ORAL DAILY
Qty: 30 | Refills: 0 | Status: DISCONTINUED | COMMUNITY
Start: 2025-09-19 | End: 2025-09-19

## 2025-09-25 PROBLEM — T78.40XA ALLERGIES: Status: ACTIVE | Noted: 2025-09-25
